# Patient Record
Sex: MALE | Race: WHITE | Employment: OTHER | URBAN - METROPOLITAN AREA
[De-identification: names, ages, dates, MRNs, and addresses within clinical notes are randomized per-mention and may not be internally consistent; named-entity substitution may affect disease eponyms.]

---

## 2017-05-19 PROBLEM — G20.C PARKINSONISM: Status: ACTIVE | Noted: 2017-05-19

## 2017-05-19 PROBLEM — Z96.659 S/P KNEE REPLACEMENT: Status: ACTIVE | Noted: 2017-05-19

## 2017-05-19 PROBLEM — G20 PARKINSONISM (HCC): Status: ACTIVE | Noted: 2017-05-19

## 2020-08-07 ENCOUNTER — OFFICE VISIT (OUTPATIENT)
Dept: ORTHOPEDIC SURGERY | Age: 70
End: 2020-08-07
Payer: COMMERCIAL

## 2020-08-07 VITALS — HEIGHT: 69 IN | WEIGHT: 135 LBS | TEMPERATURE: 98 F | BODY MASS INDEX: 19.99 KG/M2

## 2020-08-07 PROCEDURE — 99203 OFFICE O/P NEW LOW 30 MIN: CPT | Performed by: ORTHOPAEDIC SURGERY

## 2020-08-07 NOTE — PROGRESS NOTES
Michael Reed is a 81 y.o. male, who presents   Chief Complaint   Patient presents with    Knee Pain     Left Knee, x 6 weeks, states of instability, no previous knee surgery       HPI[de-identified] Left knee pain is been present for quite some time. Is worsening. Patient would like something done. He is more medial than lateral pain. There is significant deformity. He uses a cane and he switches from one hand to the other depending on his needs at the time. He also has Parkinson's disease which is been treated aggressively including medications and also a deep brain stimulator. He is working with doctors in [facility] [place] clinic are trying to decrease his medications and adjust the stimulator. They are apparently aware of his knee problems and that he may have to have something done. Allergies; medications; past medical, surgical, family, and social history; and problem list have been reviewed today and updated as indicated in this encounter - see below following Ortho specifics. Musculoskeletal: The patient walks with a cane. He has a slow steady gait. He has varus malalignment of the left knee. His parkinsonian tremor is quite variable from practically no tremor to rather violent tremors of the limbs. He attributes part of this to stressful environment and unfamiliar surroundings such as a doctor's office. Left hip range of motion is very good with no pain. Left knee range of motion is about 5 degrees to more than 110 degrees of flexion. There is medial tenderness palpation the left knee. There is significant medial gapping related to chondral wear rather than collateral instability. Collaterals and cruciates are grossly stable. Patellofemoral alignment is good. His calf is supple with no tenderness. There is minimal effusion. There is no increase in local temperature or redness.     Radiologic Studies: Imaging of the left knee and 2 views shows medial bone-on-bone contact in the left knee with hypertrophic marginal changes and varus malalignment. There are significant the lesser changes in the lateral and patellofemoral compartments. ASSESSMENT:  Dafne Meza was seen today for knee pain. Diagnoses and all orders for this visit:    Primary osteoarthritis of left knee     Treatment alternatives were reviewed including medical and physical therapies, injections, and surgical options, expected risks benefits and likely outcome of each were discussed in detail, questions asked and answered and understood. We discussed the symptoms as well as physical findings and potential solutions. Definitive treatment would be knee replacement. He indicates that he would like to proceed with that and that it should cause no problems with his Parkinson's disease. He will contact his doctors in [facility] [employer] though for any recommendations for medication adjustments or stimulator adjustment which was turned off before the right knee replacement. PLAN: Total left knee replacement arthroplasty. The surgery, risk, prognosis, limitations and rehab issues were discussed in detail with apparent good understanding. The patient was counseled at length about the risks of alf Covid-19 during their perioperative period and any recovery window from their procedure. The patient was made aware that alf Covid-19  may worsen their prognosis for recovering from their procedure  and lend to a higher morbidity and/or mortality risk. All material risks, benefits, and reasonable alternatives including postponing the procedure were discussed. The patient does wish to proceed with the procedure at this time.               Patient Active Problem List   Diagnosis    S/P knee replacement    Parkinsonism [facility])       Past Medical History:   Diagnosis Date    Parkinson's disease ([facility] [address]        Past Surgical History:   Procedure Laterality Date    DEEP BRAIN STIMULATOR PLACEMENT      L chest    FRACTURE SURGERY      nose    JOINT REPLACEMENT Right 05/16/2017    knee    KNEE ARTHROSCOPY Right     TONSILLECTOMY         Current Outpatient Medications   Medication Sig Dispense Refill    carbidopa-levodopa (SINEMET)  MG per tablet Take 2 tablets by mouth 4 times daily      carbidopa-levodopa (SINEMET)  MG per tablet TAKE 2 TABLETS BY MOUTH AT 6 00 A. M. THEN TAKE 1 1 2 TABLETS AT 11 00 A. M. 3 00 P. M. AND 8 00 P. Rafita Cassidy sertraline (ZOLOFT) 50 MG tablet TAKE 1 2 TABLET BY MOUTH ONCE DAILY FOR 7 DAYS THEN INCREASE TO 1 TABLET DAILY THEREAFTER      aspirin 325 MG EC tablet Take 1 tablet by mouth 2 times daily 30 tablet 0    rOPINIRole (REQUIP) 3 MG tablet Take 3 mg by mouth 3 times daily      carbidopa (LODOSYN) 25 MG TABS tablet Take 1 tablet by mouth 3 times daily      loratadine (CLARITIN) 10 MG tablet Take 10 mg by mouth daily      ibuprofen (ADVIL;MOTRIN) 600 MG tablet Take 600 mg by mouth every 6 hours as needed for Pain       No current facility-administered medications for this visit.         Allergies   Allergen Reactions    Seasonal        Social History     Socioeconomic History    Marital status:      Spouse name: None    Number of children: None    Years of education: None    Highest education level: None   Occupational History    None   Social Needs    Financial resource strain: None    Food insecurity     Worry: None     Inability: None    Transportation needs     Medical: None     Non-medical: None   Tobacco Use    Smoking status: Never Smoker   Substance and Sexual Activity    Alcohol use: No    Drug use: No    Sexual activity: None   Lifestyle    Physical activity     Days per week: None     Minutes per session: None    Stress: None   Relationships    Social connections     Talks on phone: None     Gets together: None     Attends Zoroastrian service: None     Active member of club or organization: None     Attends meetings of clubs or organizations: None     Relationship status: None  Intimate partner violence     Fear of current or ex partner: None     Emotionally abused: None     Physically abused: None     Forced sexual activity: None   Other Topics Concern    None   Social History Narrative    None       History reviewed. No pertinent family history. Review of Systems:   As follows except as previously noted in HPI:  Constitutional: Negative for chills, diaphoresis,  fever   Respiratory: Negative for cough, shortness of breath and wheezing. Cardiovascular: Negative for chest pain and palpitations. Neurological: Negative for dizziness, syncope,   GI / : abdominal pain or cramping  Musculoskeletal: see HPI       Objective:   Physical Exam   Constitutional: Oriented to person, place, and time. and appears well-developed and well-nourished. :   Head: Normocephalic and atraumatic. Neck: Neck supple. Eyes: EOM are normal.   Pulmonary/Chest: Effort normal.  No respiratory distress, no wheezes. Neurological: Alert and oriented to person  Skin: Skin is warm and dry. Joni Pinto, DO    8/7/20  11:26 AM    All reasonable efforts have been made to minimize the risk of errors that may occur in the use of voice recognition and other electronic means of charting.

## 2020-08-26 RX ORDER — SODIUM CHLORIDE 0.9 % (FLUSH) 0.9 %
10 SYRINGE (ML) INJECTION PRN
Status: CANCELLED | OUTPATIENT
Start: 2020-08-26

## 2020-08-26 RX ORDER — SODIUM CHLORIDE 0.9 % (FLUSH) 0.9 %
10 SYRINGE (ML) INJECTION EVERY 12 HOURS SCHEDULED
Status: CANCELLED | OUTPATIENT
Start: 2020-08-26

## 2020-08-26 RX ORDER — SODIUM CHLORIDE, SODIUM LACTATE, POTASSIUM CHLORIDE, CALCIUM CHLORIDE 600; 310; 30; 20 MG/100ML; MG/100ML; MG/100ML; MG/100ML
INJECTION, SOLUTION INTRAVENOUS CONTINUOUS
Status: CANCELLED | OUTPATIENT
Start: 2020-08-26

## 2020-08-27 ENCOUNTER — HOSPITAL ENCOUNTER (OUTPATIENT)
Age: 70
Discharge: HOME | End: 2020-08-29
Payer: COMMERCIAL

## 2020-08-27 PROCEDURE — U0003 INFECTIOUS AGENT DETECTION BY NUCLEIC ACID (DNA OR RNA); SEVERE ACUTE RESPIRATORY SYNDROME CORONAVIRUS 2 (SARS-COV-2) (CORONAVIRUS DISEASE [COVID-19]), AMPLIFIED PROBE TECHNIQUE, MAKING USE OF HIGH THROUGHPUT TECHNOLOGIES AS DESCRIBED BY CMS-2020-01-R: HCPCS

## 2020-08-29 LAB
SARS-COV-2: NOT DETECTED
SOURCE: NORMAL

## 2020-08-31 ENCOUNTER — HOSPITAL ENCOUNTER (OUTPATIENT)
Dept: PREADMISSION TESTING | Age: 70
Discharge: HOME | End: 2020-08-31
Payer: COMMERCIAL

## 2020-08-31 ENCOUNTER — ANESTHESIA EVENT (OUTPATIENT)
Dept: OPERATING ROOM | Age: 70
End: 2020-08-31
Payer: COMMERCIAL

## 2020-08-31 VITALS
HEIGHT: 69 IN | SYSTOLIC BLOOD PRESSURE: 170 MMHG | BODY MASS INDEX: 20.82 KG/M2 | DIASTOLIC BLOOD PRESSURE: 85 MMHG | TEMPERATURE: 97.1 F | OXYGEN SATURATION: 99 % | RESPIRATION RATE: 20 BRPM | HEART RATE: 74 BPM | WEIGHT: 140.56 LBS

## 2020-08-31 LAB
ABO/RH: NORMAL
ANION GAP SERPL CALCULATED.3IONS-SCNC: 11 MMOL/L (ref 7–16)
ANTIBODY SCREEN: NORMAL
APTT: 35.4 SEC (ref 24.5–35.1)
BUN BLDV-MCNC: 14 MG/DL (ref 8–23)
CALCIUM SERPL-MCNC: 8.9 MG/DL (ref 8.6–10.2)
CHLORIDE BLD-SCNC: 103 MMOL/L (ref 98–107)
CO2: 27 MMOL/L (ref 22–29)
CREAT SERPL-MCNC: 0.6 MG/DL (ref 0.7–1.2)
GFR AFRICAN AMERICAN: >60
GFR NON-AFRICAN AMERICAN: >60 ML/MIN/1.73
GLUCOSE BLD-MCNC: 95 MG/DL (ref 74–99)
HCT VFR BLD CALC: 41.6 % (ref 37–54)
HEMOGLOBIN: 13.8 G/DL (ref 12.5–16.5)
INR BLD: 1.1
MCH RBC QN AUTO: 29.7 PG (ref 26–35)
MCHC RBC AUTO-ENTMCNC: 33.2 % (ref 32–34.5)
MCV RBC AUTO: 89.7 FL (ref 80–99.9)
PDW BLD-RTO: 12.1 FL (ref 11.5–15)
PLATELET # BLD: 236 E9/L (ref 130–450)
PMV BLD AUTO: 10 FL (ref 7–12)
POTASSIUM REFLEX MAGNESIUM: 4.4 MMOL/L (ref 3.5–5)
PROTHROMBIN TIME: 12.2 SEC (ref 9.3–12.4)
RBC # BLD: 4.64 E12/L (ref 3.8–5.8)
SODIUM BLD-SCNC: 141 MMOL/L (ref 132–146)
WBC # BLD: 4.4 E9/L (ref 4.5–11.5)

## 2020-08-31 PROCEDURE — 87081 CULTURE SCREEN ONLY: CPT

## 2020-08-31 PROCEDURE — 36415 COLL VENOUS BLD VENIPUNCTURE: CPT

## 2020-08-31 PROCEDURE — 85730 THROMBOPLASTIN TIME PARTIAL: CPT

## 2020-08-31 PROCEDURE — 86850 RBC ANTIBODY SCREEN: CPT

## 2020-08-31 PROCEDURE — 85027 COMPLETE CBC AUTOMATED: CPT

## 2020-08-31 PROCEDURE — 86900 BLOOD TYPING SEROLOGIC ABO: CPT

## 2020-08-31 PROCEDURE — 86901 BLOOD TYPING SEROLOGIC RH(D): CPT

## 2020-08-31 PROCEDURE — 80048 BASIC METABOLIC PNL TOTAL CA: CPT

## 2020-08-31 PROCEDURE — 93005 ELECTROCARDIOGRAM TRACING: CPT | Performed by: ANESTHESIOLOGY

## 2020-08-31 PROCEDURE — 85610 PROTHROMBIN TIME: CPT

## 2020-08-31 RX ORDER — FENTANYL CITRATE 50 UG/ML
100 INJECTION, SOLUTION INTRAMUSCULAR; INTRAVENOUS ONCE
Status: DISCONTINUED | OUTPATIENT
Start: 2020-09-01 | End: 2020-09-01 | Stop reason: HOSPADM

## 2020-08-31 RX ORDER — ROPIVACAINE HYDROCHLORIDE 5 MG/ML
30 INJECTION, SOLUTION EPIDURAL; INFILTRATION; PERINEURAL ONCE
Status: CANCELLED | OUTPATIENT
Start: 2020-08-31 | End: 2020-08-31

## 2020-08-31 RX ORDER — MIDAZOLAM HYDROCHLORIDE 1 MG/ML
1 INJECTION INTRAMUSCULAR; INTRAVENOUS EVERY 5 MIN PRN
Status: CANCELLED | OUTPATIENT
Start: 2020-08-31

## 2020-08-31 ASSESSMENT — PAIN DESCRIPTION - ORIENTATION: ORIENTATION: LEFT

## 2020-08-31 ASSESSMENT — PAIN DESCRIPTION - PROGRESSION: CLINICAL_PROGRESSION: GRADUALLY WORSENING

## 2020-08-31 ASSESSMENT — PAIN DESCRIPTION - PAIN TYPE: TYPE: CHRONIC PAIN

## 2020-08-31 ASSESSMENT — PAIN SCALES - GENERAL: PAINLEVEL_OUTOF10: 10

## 2020-08-31 ASSESSMENT — PAIN DESCRIPTION - LOCATION: LOCATION: KNEE

## 2020-08-31 ASSESSMENT — PAIN DESCRIPTION - ONSET: ONSET: ON-GOING

## 2020-08-31 ASSESSMENT — PAIN DESCRIPTION - DESCRIPTORS: DESCRIPTORS: ACHING

## 2020-08-31 NOTE — ANESTHESIA PRE PROCEDURE
Department of Anesthesiology  Preprocedure Note       Name:  Julio Munoz   Age:  81 y.o.  :  1950                                          MRN:  [id number]         Date:  2020      Surgeon: Rachna Klein):  PAULETTE Guerrero DO    Procedure: Procedure(s):  TOTAL LEFT KNEE REPLACEMENT/ ARTHROPLASTY (JOLENE)    Medications prior to admission:   Prior to Admission medications    Medication Sig Start Date End Date Taking? Authorizing Provider   carbidopa-levodopa (SINEMET)  MG per tablet Take 2 tablets by mouth 4 times daily 20  Yes Historical Provider, MD   sertraline (ZOLOFT) 50 MG tablet TAKE 1 2 TABLET BY MOUTH ONCE DAILY FOR 7 DAYS THEN INCREASE TO 1 TABLET DAILY THEREAFTER 20  Yes Historical Provider, MD   loratadine (CLARITIN) 10 MG tablet Take 10 mg by mouth daily   Yes Historical Provider, MD   ibuprofen (ADVIL;MOTRIN) 600 MG tablet Take 600 mg by mouth every 6 hours as needed for Pain   Yes Historical Provider, MD       Current medications:    Current Outpatient Medications   Medication Sig Dispense Refill    carbidopa-levodopa (SINEMET)  MG per tablet Take 2 tablets by mouth 4 times daily      sertraline (ZOLOFT) 50 MG tablet TAKE 1 2 TABLET BY MOUTH ONCE DAILY FOR 7 DAYS THEN INCREASE TO 1 TABLET DAILY THEREAFTER      loratadine (CLARITIN) 10 MG tablet Take 10 mg by mouth daily      ibuprofen (ADVIL;MOTRIN) 600 MG tablet Take 600 mg by mouth every 6 hours as needed for Pain       No current facility-administered medications for this encounter. Allergies:     Allergies   Allergen Reactions    Seasonal        Problem List:    Patient Active Problem List   Diagnosis Code    S/P knee replacement Z96.659    Parkinsonism ([facility] [address] G20       Past Medical History:        Diagnosis Date    Parkinson's disease ([facility] [address]        Past Surgical History:        Procedure Laterality Date    DEEP BRAIN STIMULATOR PLACEMENT      L chest    FRACTURE SURGERY      nose    JOINT Type & Screen (If Applicable):  No results found for: LABABO, LABRH    Drug/Infectious Status (If Applicable):  No results found for: HIV, HEPCAB    COVID-19 Screening (If Applicable):   Lab Results   Component Value Date    COVID19 Not Detected 08/27/2020         Anesthesia Evaluation  Patient summary reviewed and Nursing notes reviewed  Airway: Mallampati: II  TM distance: >3 FB   Neck ROM: full  Mouth opening: > = 3 FB Dental: normal exam   (+) upper dentures      Pulmonary:Negative Pulmonary ROS breath sounds clear to auscultation                             Cardiovascular:Negative CV ROS            Rhythm: regular  Rate: normal                    Neuro/Psych:   (+) neuromuscular disease: Parkinson's disease, depression/anxiety             GI/Hepatic/Renal: Neg GI/Hepatic/Renal ROS            Endo/Other:    (+) : arthritis: OA., .                 Abdominal:           Vascular:                                      Anesthesia Plan      spinal     ASA 3     (Pt accepting of spinal and ACB with sedation. Has a brain stimulator which will need to be turned off, pt may have more confusion and nausea post op has a letter from the [facility] [facility] [place] [facility] from his neurologist. Will need bipolar cautery. Had previous RTKR and did well with the stimulator off.)  Induction: intravenous. MIPS: Postoperative opioids intended. Anesthetic plan and risks discussed with patient. Plan discussed with CRNA and attending.                 Keyona Alvarez MD   8/31/2020

## 2020-08-31 NOTE — PROGRESS NOTES
Call to Sav in Dr. Teena Crowley office re: Dr. Ben Kam will see pt day of surgery for clearance. This information reviewed with Dr. Ketan Romo also.
Called Sav at Dr. Nelida Busby office with concern on turning off DBS for surgery. Sav sent us an 800 number to [employer] to speak to someone about this. Attempted to speak with someone at [employer] to no avail; on hold for too long. Asked the pt. And his wife if they wanted to stay and wait on hold with [employer] or leave and call them themselves today for instruction with a patient . Pt. Went home and 800 number given to wife to call [employer] for instruction.
Case reviewed with Dr. Facundo Perales- neurology note read to him, Dr. Facundo Perales would like medical clearance for pt. I spoke with Sav at Dr. Darryle Butte office to let her know pt did not show up for his PAT appointment, that he needs medical clearance, that I am unable to reach pt or his wife.
or nail polish on your fingers or toes. 11. DO NOT wear any jewelry or piercings on day of surgery. All body piercing jewelry must be removed. 12. Shower the night before surgery with _x__Antibacterial soap /STANLEY WIPES____x____    13. TOTAL JOINT REPLACEMENT/HYSTERECTOMY PATIENTS ONLY---Remember to bring Blood Bank bracelet to the hospital on the day of surgery. 14. If you have a Living Will and Durable Power of  for Healthcare, please bring in a copy. 15. If appropriate bring crutches, inspirex, WALKER, CANE etc... 12. Notify your Surgeon if you develop any illness between now and surgery time, cough, cold, fever, sore throat, nausea, vomiting, etc.  Please notify your surgeon if you experience dizziness, shortness of breath or blurred vision between now & the time of your surgery. 17. If you have _x__dentures, they will be removed before going to the OR; we will provide you a container. If you wear ___contact lenses or ___glasses, they will be removed; please bring a case for them. 18. To provide excellent care visitors will be limited to 1 in the room at any given time. 19. Please bring picture ID and insurance card. 20. Sleep apnea patients need to bring CPAP AND SETTINGS to hospital on day of surgery. 21. During flu season no children under the age of 15 are permitted in the hospital for the safety of all patients. 22. Other                  Please call AMBULATORY CARE if you have any further questions.    [address]     [address]

## 2020-08-31 NOTE — CARE COORDINATION
SS NOTE: HUSSEIN met with pt and his wife Yann Rapp in [place] today. Pt is slated for KA on 09/01. Pt resides with Yann Rapp in a home with 1 step entry no railing. Pt's wife works outside of the home however is on a vacation this week and will be home. Pt was I pta with adls iadls and driving. Pt has DME at home; standard walker, extended tub bench and a straight cane. Pt relates that he had a RTKA in 2017. He went home with [facility] [address] at that time but cannot recall the name of the [facility] [place] [address] provider. Pt agrees to use [facility] and will need orders to complete a referral at [facility]- SW advised [facility] of the referral and they will follow pt upon admission. Pt has no hx of SNF /Rehab. Pt has no PCP and he uses [address] SW will follow pt once admitted for final [facility] planning for home. Laura Bell. 8/31/2020.12:51 PM.

## 2020-09-01 ENCOUNTER — ANESTHESIA (OUTPATIENT)
Dept: OPERATING ROOM | Age: 70
End: 2020-09-01
Payer: COMMERCIAL

## 2020-09-01 ENCOUNTER — APPOINTMENT (OUTPATIENT)
Dept: GENERAL RADIOLOGY | Age: 70
End: 2020-09-01
Attending: ORTHOPAEDIC SURGERY
Payer: COMMERCIAL

## 2020-09-01 ENCOUNTER — HOSPITAL ENCOUNTER (OUTPATIENT)
Age: 70
Setting detail: OBSERVATION
Discharge: HOME | End: 2020-09-03
Attending: ORTHOPAEDIC SURGERY | Admitting: ORTHOPAEDIC SURGERY
Payer: COMMERCIAL

## 2020-09-01 VITALS
SYSTOLIC BLOOD PRESSURE: 104 MMHG | OXYGEN SATURATION: 98 % | RESPIRATION RATE: 15 BRPM | DIASTOLIC BLOOD PRESSURE: 66 MMHG

## 2020-09-01 PROBLEM — M17.12 ARTHRITIS OF LEFT KNEE: Status: ACTIVE | Noted: 2020-09-01

## 2020-09-01 LAB — MRSA CULTURE ONLY: NORMAL

## 2020-09-01 PROCEDURE — C1713 ANCHOR/SCREW BN/BN,TIS/BN: HCPCS | Performed by: ORTHOPAEDIC SURGERY

## 2020-09-01 PROCEDURE — 88305 TISSUE EXAM BY PATHOLOGIST: CPT

## 2020-09-01 PROCEDURE — 2580000003 HC RX 258: Performed by: ORTHOPAEDIC SURGERY

## 2020-09-01 PROCEDURE — 6360000002 HC RX W HCPCS: Performed by: ORTHOPAEDIC SURGERY

## 2020-09-01 PROCEDURE — 88311 DECALCIFY TISSUE: CPT

## 2020-09-01 PROCEDURE — 2500000003 HC RX 250 WO HCPCS: Performed by: NURSE ANESTHETIST, CERTIFIED REGISTERED

## 2020-09-01 PROCEDURE — 6370000000 HC RX 637 (ALT 250 FOR IP): Performed by: ORTHOPAEDIC SURGERY

## 2020-09-01 PROCEDURE — 6370000000 HC RX 637 (ALT 250 FOR IP): Performed by: INTERNAL MEDICINE

## 2020-09-01 PROCEDURE — C1776 JOINT DEVICE (IMPLANTABLE): HCPCS | Performed by: ORTHOPAEDIC SURGERY

## 2020-09-01 PROCEDURE — 6360000002 HC RX W HCPCS: Performed by: NURSE ANESTHETIST, CERTIFIED REGISTERED

## 2020-09-01 PROCEDURE — 3700000001 HC ADD 15 MINUTES (ANESTHESIA): Performed by: ORTHOPAEDIC SURGERY

## 2020-09-01 PROCEDURE — 7100000000 HC PACU RECOVERY - FIRST 15 MIN: Performed by: ORTHOPAEDIC SURGERY

## 2020-09-01 PROCEDURE — 7100000001 HC PACU RECOVERY - ADDTL 15 MIN: Performed by: ORTHOPAEDIC SURGERY

## 2020-09-01 PROCEDURE — 6360000002 HC RX W HCPCS

## 2020-09-01 PROCEDURE — G0378 HOSPITAL OBSERVATION PER HR: HCPCS

## 2020-09-01 PROCEDURE — 97165 OT EVAL LOW COMPLEX 30 MIN: CPT

## 2020-09-01 PROCEDURE — 97110 THERAPEUTIC EXERCISES: CPT | Performed by: PHYSICAL THERAPIST

## 2020-09-01 PROCEDURE — 2500000003 HC RX 250 WO HCPCS: Performed by: ORTHOPAEDIC SURGERY

## 2020-09-01 PROCEDURE — 3600000005 HC SURGERY LEVEL 5 BASE: Performed by: ORTHOPAEDIC SURGERY

## 2020-09-01 PROCEDURE — 76942 ECHO GUIDE FOR BIOPSY: CPT | Performed by: ANESTHESIOLOGY

## 2020-09-01 PROCEDURE — 3600000015 HC SURGERY LEVEL 5 ADDTL 15MIN: Performed by: ORTHOPAEDIC SURGERY

## 2020-09-01 PROCEDURE — 97535 SELF CARE MNGMENT TRAINING: CPT

## 2020-09-01 PROCEDURE — 3700000000 HC ANESTHESIA ATTENDED CARE: Performed by: ORTHOPAEDIC SURGERY

## 2020-09-01 PROCEDURE — 2709999900 HC NON-CHARGEABLE SUPPLY: Performed by: ORTHOPAEDIC SURGERY

## 2020-09-01 PROCEDURE — 73560 X-RAY EXAM OF KNEE 1 OR 2: CPT

## 2020-09-01 PROCEDURE — 97161 PT EVAL LOW COMPLEX 20 MIN: CPT | Performed by: PHYSICAL THERAPIST

## 2020-09-01 PROCEDURE — 27447 TOTAL KNEE ARTHROPLASTY: CPT | Performed by: ORTHOPAEDIC SURGERY

## 2020-09-01 PROCEDURE — 2580000003 HC RX 258: Performed by: ANESTHESIOLOGY

## 2020-09-01 PROCEDURE — 2580000003 HC RX 258: Performed by: NURSE ANESTHETIST, CERTIFIED REGISTERED

## 2020-09-01 DEVICE — COMPONENT PAT DIA27MM THK8MM KNEE X3 SYMMETRIC TRIATHLON: Type: IMPLANTABLE DEVICE | Site: KNEE | Status: FUNCTIONAL

## 2020-09-01 DEVICE — COMPONENT FEM SZ 5 L KNEE POST STBL CEM TRIATHLON: Type: IMPLANTABLE DEVICE | Site: KNEE | Status: FUNCTIONAL

## 2020-09-01 DEVICE — COMPONENT PART KNEE CAPPED K1 STRYKER: Type: IMPLANTABLE DEVICE | Site: KNEE | Status: FUNCTIONAL

## 2020-09-01 DEVICE — BASEPLATE TIB SZ 5 KNEE TRITANIUM CEM PRI LO PROF TRIATHLON: Type: IMPLANTABLE DEVICE | Site: KNEE | Status: FUNCTIONAL

## 2020-09-01 DEVICE — CEMENT BNE 40 GM RADIOPAQUE BA SIMPLEX P: Type: IMPLANTABLE DEVICE | Site: KNEE | Status: FUNCTIONAL

## 2020-09-01 DEVICE — INSERT TIB PS 5 10 MM ARTC KNEE BEAR TECHNOLOGY X3 TRIATHLON: Type: IMPLANTABLE DEVICE | Site: KNEE | Status: FUNCTIONAL

## 2020-09-01 RX ORDER — MORPHINE SULFATE 2 MG/ML
2 INJECTION, SOLUTION INTRAMUSCULAR; INTRAVENOUS EVERY 4 HOURS PRN
Status: DISCONTINUED | OUTPATIENT
Start: 2020-09-01 | End: 2020-09-03 | Stop reason: HOSPADM

## 2020-09-01 RX ORDER — SENNA AND DOCUSATE SODIUM 50; 8.6 MG/1; MG/1
1 TABLET, FILM COATED ORAL 2 TIMES DAILY
Status: DISCONTINUED | OUTPATIENT
Start: 2020-09-01 | End: 2020-09-03 | Stop reason: HOSPADM

## 2020-09-01 RX ORDER — GLYCOPYRROLATE 1 MG/5 ML
SYRINGE (ML) INTRAVENOUS PRN
Status: DISCONTINUED | OUTPATIENT
Start: 2020-09-01 | End: 2020-09-01 | Stop reason: SDUPTHER

## 2020-09-01 RX ORDER — SODIUM CHLORIDE 0.9 % (FLUSH) 0.9 %
10 SYRINGE (ML) INJECTION EVERY 12 HOURS SCHEDULED
Status: DISCONTINUED | OUTPATIENT
Start: 2020-09-01 | End: 2020-09-03 | Stop reason: HOSPADM

## 2020-09-01 RX ORDER — MORPHINE SULFATE 4 MG/ML
4 INJECTION, SOLUTION INTRAMUSCULAR; INTRAVENOUS
Status: DISCONTINUED | OUTPATIENT
Start: 2020-09-01 | End: 2020-09-01 | Stop reason: DRUGHIGH

## 2020-09-01 RX ORDER — SODIUM CHLORIDE 0.9 % (FLUSH) 0.9 %
10 SYRINGE (ML) INJECTION PRN
Status: DISCONTINUED | OUTPATIENT
Start: 2020-09-01 | End: 2020-09-01 | Stop reason: HOSPADM

## 2020-09-01 RX ORDER — MORPHINE SULFATE 2 MG/ML
2 INJECTION, SOLUTION INTRAMUSCULAR; INTRAVENOUS
Status: DISCONTINUED | OUTPATIENT
Start: 2020-09-01 | End: 2020-09-01 | Stop reason: DRUGHIGH

## 2020-09-01 RX ORDER — MIDAZOLAM HYDROCHLORIDE 1 MG/ML
INJECTION INTRAMUSCULAR; INTRAVENOUS
Status: COMPLETED
Start: 2020-09-01 | End: 2020-09-01

## 2020-09-01 RX ORDER — ROPIVACAINE HYDROCHLORIDE 5 MG/ML
30 INJECTION, SOLUTION EPIDURAL; INFILTRATION; PERINEURAL ONCE
Status: COMPLETED | OUTPATIENT
Start: 2020-09-01 | End: 2020-09-01

## 2020-09-01 RX ORDER — SODIUM CHLORIDE 0.9 % (FLUSH) 0.9 %
10 SYRINGE (ML) INJECTION EVERY 12 HOURS SCHEDULED
Status: DISCONTINUED | OUTPATIENT
Start: 2020-09-01 | End: 2020-09-01 | Stop reason: HOSPADM

## 2020-09-01 RX ORDER — PROPOFOL 10 MG/ML
INJECTION, EMULSION INTRAVENOUS CONTINUOUS PRN
Status: DISCONTINUED | OUTPATIENT
Start: 2020-09-01 | End: 2020-09-01 | Stop reason: SDUPTHER

## 2020-09-01 RX ORDER — MORPHINE SULFATE 4 MG/ML
3 INJECTION, SOLUTION INTRAMUSCULAR; INTRAVENOUS EVERY 4 HOURS PRN
Status: DISCONTINUED | OUTPATIENT
Start: 2020-09-01 | End: 2020-09-03 | Stop reason: HOSPADM

## 2020-09-01 RX ORDER — SODIUM CHLORIDE, SODIUM LACTATE, POTASSIUM CHLORIDE, CALCIUM CHLORIDE 600; 310; 30; 20 MG/100ML; MG/100ML; MG/100ML; MG/100ML
INJECTION, SOLUTION INTRAVENOUS CONTINUOUS PRN
Status: DISCONTINUED | OUTPATIENT
Start: 2020-09-01 | End: 2020-09-01 | Stop reason: SDUPTHER

## 2020-09-01 RX ORDER — CELECOXIB 100 MG/1
100 CAPSULE ORAL ONCE
Status: COMPLETED | OUTPATIENT
Start: 2020-09-01 | End: 2020-09-01

## 2020-09-01 RX ORDER — OXYCODONE HYDROCHLORIDE 5 MG/1
10 TABLET ORAL EVERY 4 HOURS PRN
Status: DISCONTINUED | OUTPATIENT
Start: 2020-09-01 | End: 2020-09-03 | Stop reason: HOSPADM

## 2020-09-01 RX ORDER — ONDANSETRON 2 MG/ML
4 INJECTION INTRAMUSCULAR; INTRAVENOUS
Status: DISCONTINUED | OUTPATIENT
Start: 2020-09-01 | End: 2020-09-01 | Stop reason: HOSPADM

## 2020-09-01 RX ORDER — CETIRIZINE HYDROCHLORIDE 10 MG/1
10 TABLET ORAL DAILY
Status: DISCONTINUED | OUTPATIENT
Start: 2020-09-01 | End: 2020-09-03 | Stop reason: HOSPADM

## 2020-09-01 RX ORDER — ACETAMINOPHEN 500 MG
1000 TABLET ORAL ONCE
Status: COMPLETED | OUTPATIENT
Start: 2020-09-01 | End: 2020-09-01

## 2020-09-01 RX ORDER — OXYCODONE HYDROCHLORIDE 5 MG/1
5 TABLET ORAL EVERY 4 HOURS PRN
Status: DISCONTINUED | OUTPATIENT
Start: 2020-09-01 | End: 2020-09-03 | Stop reason: HOSPADM

## 2020-09-01 RX ORDER — LABETALOL HYDROCHLORIDE 5 MG/ML
5 INJECTION, SOLUTION INTRAVENOUS EVERY 10 MIN PRN
Status: DISCONTINUED | OUTPATIENT
Start: 2020-09-01 | End: 2020-09-01 | Stop reason: HOSPADM

## 2020-09-01 RX ORDER — DEXTROSE AND SODIUM CHLORIDE 5; .45 G/100ML; G/100ML
INJECTION, SOLUTION INTRAVENOUS CONTINUOUS
Status: DISCONTINUED | OUTPATIENT
Start: 2020-09-01 | End: 2020-09-02

## 2020-09-01 RX ORDER — ACETAMINOPHEN 325 MG/1
650 TABLET ORAL EVERY 6 HOURS
Status: DISCONTINUED | OUTPATIENT
Start: 2020-09-01 | End: 2020-09-03 | Stop reason: HOSPADM

## 2020-09-01 RX ORDER — FENTANYL CITRATE 50 UG/ML
50 INJECTION, SOLUTION INTRAMUSCULAR; INTRAVENOUS EVERY 5 MIN PRN
Status: DISCONTINUED | OUTPATIENT
Start: 2020-09-01 | End: 2020-09-01

## 2020-09-01 RX ORDER — SODIUM CHLORIDE, SODIUM LACTATE, POTASSIUM CHLORIDE, CALCIUM CHLORIDE 600; 310; 30; 20 MG/100ML; MG/100ML; MG/100ML; MG/100ML
INJECTION, SOLUTION INTRAVENOUS CONTINUOUS
Status: DISCONTINUED | OUTPATIENT
Start: 2020-09-01 | End: 2020-09-01

## 2020-09-01 RX ORDER — HYDROCODONE BITARTRATE AND ACETAMINOPHEN 5; 325 MG/1; MG/1
1 TABLET ORAL EVERY 6 HOURS PRN
Qty: 28 TABLET | Refills: 0 | Status: SHIPPED | OUTPATIENT
Start: 2020-09-01 | End: 2020-09-08

## 2020-09-01 RX ORDER — BUPIVACAINE HYDROCHLORIDE 2.5 MG/ML
INJECTION, SOLUTION EPIDURAL; INFILTRATION; INTRACAUDAL PRN
Status: DISCONTINUED | OUTPATIENT
Start: 2020-09-01 | End: 2020-09-01 | Stop reason: ALTCHOICE

## 2020-09-01 RX ORDER — GABAPENTIN 300 MG/1
300 CAPSULE ORAL ONCE
Status: COMPLETED | OUTPATIENT
Start: 2020-09-01 | End: 2020-09-01

## 2020-09-01 RX ORDER — ROPIVACAINE HYDROCHLORIDE 5 MG/ML
INJECTION, SOLUTION EPIDURAL; INFILTRATION; PERINEURAL
Status: COMPLETED
Start: 2020-09-01 | End: 2020-09-01

## 2020-09-01 RX ORDER — FENTANYL CITRATE 50 UG/ML
INJECTION, SOLUTION INTRAMUSCULAR; INTRAVENOUS
Status: COMPLETED
Start: 2020-09-01 | End: 2020-09-01

## 2020-09-01 RX ORDER — MEPERIDINE HYDROCHLORIDE 25 MG/ML
12.5 INJECTION INTRAMUSCULAR; INTRAVENOUS; SUBCUTANEOUS EVERY 5 MIN PRN
Status: DISCONTINUED | OUTPATIENT
Start: 2020-09-01 | End: 2020-09-01 | Stop reason: HOSPADM

## 2020-09-01 RX ORDER — HYDRALAZINE HYDROCHLORIDE 20 MG/ML
5 INJECTION INTRAMUSCULAR; INTRAVENOUS EVERY 10 MIN PRN
Status: DISCONTINUED | OUTPATIENT
Start: 2020-09-01 | End: 2020-09-01 | Stop reason: HOSPADM

## 2020-09-01 RX ORDER — PROMETHAZINE HYDROCHLORIDE 25 MG/ML
6.25 INJECTION, SOLUTION INTRAMUSCULAR; INTRAVENOUS
Status: DISCONTINUED | OUTPATIENT
Start: 2020-09-01 | End: 2020-09-01 | Stop reason: HOSPADM

## 2020-09-01 RX ORDER — BUPIVACAINE HYDROCHLORIDE 7.5 MG/ML
INJECTION, SOLUTION INTRASPINAL PRN
Status: DISCONTINUED | OUTPATIENT
Start: 2020-09-01 | End: 2020-09-01 | Stop reason: SDUPTHER

## 2020-09-01 RX ORDER — VANCOMYCIN HYDROCHLORIDE 1 G/20ML
INJECTION, POWDER, LYOPHILIZED, FOR SOLUTION INTRAVENOUS PRN
Status: DISCONTINUED | OUTPATIENT
Start: 2020-09-01 | End: 2020-09-01 | Stop reason: ALTCHOICE

## 2020-09-01 RX ORDER — DEXAMETHASONE SODIUM PHOSPHATE 10 MG/ML
8 INJECTION INTRAMUSCULAR; INTRAVENOUS ONCE
Status: COMPLETED | OUTPATIENT
Start: 2020-09-01 | End: 2020-09-01

## 2020-09-01 RX ORDER — ROPIVACAINE HYDROCHLORIDE 5 MG/ML
INJECTION, SOLUTION EPIDURAL; INFILTRATION; PERINEURAL
Status: COMPLETED | OUTPATIENT
Start: 2020-09-01 | End: 2020-09-01

## 2020-09-01 RX ORDER — FENTANYL CITRATE 50 UG/ML
INJECTION, SOLUTION INTRAMUSCULAR; INTRAVENOUS PRN
Status: DISCONTINUED | OUTPATIENT
Start: 2020-09-01 | End: 2020-09-01 | Stop reason: SDUPTHER

## 2020-09-01 RX ORDER — MIDAZOLAM HYDROCHLORIDE 1 MG/ML
1 INJECTION INTRAMUSCULAR; INTRAVENOUS EVERY 5 MIN PRN
Status: DISCONTINUED | OUTPATIENT
Start: 2020-09-01 | End: 2020-09-01 | Stop reason: HOSPADM

## 2020-09-01 RX ORDER — SODIUM CHLORIDE 0.9 % (FLUSH) 0.9 %
10 SYRINGE (ML) INJECTION PRN
Status: DISCONTINUED | OUTPATIENT
Start: 2020-09-01 | End: 2020-09-03 | Stop reason: HOSPADM

## 2020-09-01 RX ADMIN — OXYCODONE HYDROCHLORIDE 5 MG: 5 TABLET ORAL at 22:33

## 2020-09-01 RX ADMIN — GABAPENTIN 300 MG: 300 CAPSULE ORAL at 08:50

## 2020-09-01 RX ADMIN — FENTANYL CITRATE 100 MCG: 50 INJECTION, SOLUTION INTRAMUSCULAR; INTRAVENOUS at 09:15

## 2020-09-01 RX ADMIN — ROPIVACAINE HYDROCHLORIDE 30 ML: 5 INJECTION, SOLUTION EPIDURAL; INFILTRATION; PERINEURAL at 09:20

## 2020-09-01 RX ADMIN — BUPIVACAINE HYDROCHLORIDE IN DEXTROSE 1.8 ML: 7.5 INJECTION, SOLUTION SUBARACHNOID at 11:31

## 2020-09-01 RX ADMIN — DOCUSATE SODIUM - SENNOSIDES 1 TABLET: 50; 8.6 TABLET, FILM COATED ORAL at 20:52

## 2020-09-01 RX ADMIN — SODIUM CHLORIDE, POTASSIUM CHLORIDE, SODIUM LACTATE AND CALCIUM CHLORIDE: 600; 310; 30; 20 INJECTION, SOLUTION INTRAVENOUS at 13:30

## 2020-09-01 RX ADMIN — Medication 2 G: at 22:26

## 2020-09-01 RX ADMIN — CARBIDOPA AND LEVODOPA 1 TABLET: 25; 100 TABLET ORAL at 21:17

## 2020-09-01 RX ADMIN — ACETAMINOPHEN 650 MG: 325 TABLET, FILM COATED ORAL at 16:03

## 2020-09-01 RX ADMIN — FENTANYL CITRATE 25 MCG: 50 INJECTION, SOLUTION INTRAMUSCULAR; INTRAVENOUS at 12:25

## 2020-09-01 RX ADMIN — CARBIDOPA AND LEVODOPA 2 TABLET: 25; 100 TABLET ORAL at 16:03

## 2020-09-01 RX ADMIN — DEXTROSE AND SODIUM CHLORIDE: 5; 450 INJECTION, SOLUTION INTRAVENOUS at 20:52

## 2020-09-01 RX ADMIN — ROPIVACAINE HYDROCHLORIDE 40 ML: 5 INJECTION, SOLUTION EPIDURAL; INFILTRATION; PERINEURAL at 09:21

## 2020-09-01 RX ADMIN — DEXAMETHASONE SODIUM PHOSPHATE 8 MG: 10 INJECTION INTRAMUSCULAR; INTRAVENOUS at 08:50

## 2020-09-01 RX ADMIN — Medication 2 G: at 11:28

## 2020-09-01 RX ADMIN — SODIUM CHLORIDE, POTASSIUM CHLORIDE, SODIUM LACTATE AND CALCIUM CHLORIDE: 600; 310; 30; 20 INJECTION, SOLUTION INTRAVENOUS at 08:50

## 2020-09-01 RX ADMIN — MIDAZOLAM HYDROCHLORIDE 1 MG: 2 INJECTION, SOLUTION INTRAMUSCULAR; INTRAVENOUS at 09:16

## 2020-09-01 RX ADMIN — Medication 2 G: at 14:15

## 2020-09-01 RX ADMIN — SODIUM CHLORIDE, POTASSIUM CHLORIDE, SODIUM LACTATE AND CALCIUM CHLORIDE: 600; 310; 30; 20 INJECTION, SOLUTION INTRAVENOUS at 11:13

## 2020-09-01 RX ADMIN — FENTANYL CITRATE 25 MCG: 50 INJECTION, SOLUTION INTRAMUSCULAR; INTRAVENOUS at 11:31

## 2020-09-01 RX ADMIN — FENTANYL CITRATE 50 MCG: 50 INJECTION, SOLUTION INTRAMUSCULAR; INTRAVENOUS at 12:51

## 2020-09-01 RX ADMIN — OXYCODONE HYDROCHLORIDE 5 MG: 5 TABLET ORAL at 18:37

## 2020-09-01 RX ADMIN — ACETAMINOPHEN 1000 MG: 500 TABLET, FILM COATED ORAL at 08:51

## 2020-09-01 RX ADMIN — CELECOXIB 100 MG: 100 CAPSULE ORAL at 08:50

## 2020-09-01 RX ADMIN — CARBIDOPA AND LEVODOPA 1 TABLET: 25; 100 TABLET ORAL at 20:45

## 2020-09-01 RX ADMIN — MIDAZOLAM HYDROCHLORIDE 1 MG: 1 INJECTION INTRAMUSCULAR; INTRAVENOUS at 09:16

## 2020-09-01 RX ADMIN — PROPOFOL INJECTABLE EMULSION 50 MCG/KG/MIN: 10 INJECTION, EMULSION INTRAVENOUS at 11:36

## 2020-09-01 RX ADMIN — Medication 0.2 MG: at 12:11

## 2020-09-01 RX ADMIN — SODIUM CHLORIDE, POTASSIUM CHLORIDE, SODIUM LACTATE AND CALCIUM CHLORIDE: 600; 310; 30; 20 INJECTION, SOLUTION INTRAVENOUS at 12:05

## 2020-09-01 RX ADMIN — ACETAMINOPHEN 650 MG: 325 TABLET, FILM COATED ORAL at 20:52

## 2020-09-01 ASSESSMENT — PULMONARY FUNCTION TESTS
PIF_VALUE: 0
PIF_VALUE: 1
PIF_VALUE: 0
PIF_VALUE: 0
PIF_VALUE: 1
PIF_VALUE: 0
PIF_VALUE: 1
PIF_VALUE: 0
PIF_VALUE: 1
PIF_VALUE: 0
PIF_VALUE: 1
PIF_VALUE: 1
PIF_VALUE: 0
PIF_VALUE: 1
PIF_VALUE: 0
PIF_VALUE: 1
PIF_VALUE: 0
PIF_VALUE: 0
PIF_VALUE: 1
PIF_VALUE: 0
PIF_VALUE: 1
PIF_VALUE: 0
PIF_VALUE: 0
PIF_VALUE: 1
PIF_VALUE: 1
PIF_VALUE: 0
PIF_VALUE: 1
PIF_VALUE: 0
PIF_VALUE: 1
PIF_VALUE: 1
PIF_VALUE: 0
PIF_VALUE: 0
PIF_VALUE: 1
PIF_VALUE: 1
PIF_VALUE: 0
PIF_VALUE: 1
PIF_VALUE: 0
PIF_VALUE: 1
PIF_VALUE: 0
PIF_VALUE: 1
PIF_VALUE: 1
PIF_VALUE: 0
PIF_VALUE: 1
PIF_VALUE: 0
PIF_VALUE: 0
PIF_VALUE: 1
PIF_VALUE: 0
PIF_VALUE: 1
PIF_VALUE: 0
PIF_VALUE: 0
PIF_VALUE: 1
PIF_VALUE: 0
PIF_VALUE: 1
PIF_VALUE: 0
PIF_VALUE: 1
PIF_VALUE: 1
PIF_VALUE: 0
PIF_VALUE: 1
PIF_VALUE: 0
PIF_VALUE: 1
PIF_VALUE: 0
PIF_VALUE: 1
PIF_VALUE: 0
PIF_VALUE: 1
PIF_VALUE: 0
PIF_VALUE: 1
PIF_VALUE: 0
PIF_VALUE: 0
PIF_VALUE: 1

## 2020-09-01 ASSESSMENT — PAIN SCALES - GENERAL
PAINLEVEL_OUTOF10: 0
PAINLEVEL_OUTOF10: 0
PAINLEVEL_OUTOF10: 3
PAINLEVEL_OUTOF10: 5
PAINLEVEL_OUTOF10: 0
PAINLEVEL_OUTOF10: 0
PAINLEVEL_OUTOF10: 1
PAINLEVEL_OUTOF10: 6
PAINLEVEL_OUTOF10: 0
PAINLEVEL_OUTOF10: 0
PAINLEVEL_OUTOF10: 1

## 2020-09-01 ASSESSMENT — PAIN - FUNCTIONAL ASSESSMENT: PAIN_FUNCTIONAL_ASSESSMENT: 0-10

## 2020-09-01 NOTE — ANESTHESIA PROCEDURE NOTES
Peripheral Block    Patient location during procedure: pre-op  Start time: 9/1/2020 9:18 AM  End time: 9/1/2020 9:21 AM  Staffing  Anesthesiologist: Marietta Mondragon MD  Other anesthesia staff: Dudley Bocanegra RN  Performed: other anesthesia staff   Preanesthetic Checklist  Completed: patient identified, site marked, surgical consent, pre-op evaluation, timeout performed, IV checked, risks and benefits discussed, monitors and equipment checked, anesthesia consent given, oxygen available and patient being monitored  Peripheral Block  Patient position: supine  Prep: ChloraPrep  Patient monitoring: cardiac monitor, continuous pulse ox, continuous capnometry, frequent blood pressure checks and IV access  Block type: Femoral  Laterality: left  Injection technique: single-shot  Procedures: ultrasound guided  Local infiltration: lidocaine  Infiltration strength: 1 %  Dose: 1 mL  Adductor canal  Provider prep: mask and sterile gloves  Local infiltration: lidocaine  Needle  Needle type: combined needle/nerve stimulator   Needle gauge: 20 G  Needle length: 10 cm  Needle localization: ultrasound guidance  Assessment  Injection assessment: negative aspiration for heme, no paresthesia on injection and local visualized surrounding nerve on ultrasound  Paresthesia pain: none  Slow fractionated injection: yes  Hemodynamics: stable  Medications Administered  Ropivacaine (NAROPIN) injection 0.5%, 40 mL  Reason for block: post-op pain management

## 2020-09-01 NOTE — ANESTHESIA PROCEDURE NOTES
Spinal Block    Patient location during procedure: OR  Start time: 9/1/2020 11:15 AM  End time: 9/1/2020 11:28 AM  Reason for block: primary anesthetic and at surgeon's request  Staffing  Anesthesiologist: Unique Silveira MD  Other anesthesia staff: Brianda Sky RN  Performed: anesthesiologist and other anesthesia staff   Preanesthetic Checklist  Completed: patient identified, site marked, surgical consent, pre-op evaluation, timeout performed, IV checked, risks and benefits discussed, monitors and equipment checked, anesthesia consent given, oxygen available and patient being monitored  Spinal Block  Patient position: sitting  Prep: ChloraPrep  Patient monitoring: cardiac monitor, continuous pulse ox, continuous capnometry and frequent blood pressure checks  Approach: midline  Location: L3/L4  Provider prep: mask, sterile gloves and sterile gown  Local infiltration: lidocaine  Dose: 0.5  Agent: bupivacaine  Adjuvant: fentanyl  Dose: 2  Dose: 2  Needle  Needle type: Quincke   Needle gauge: 22 G  Needle length: 3.5 in  Needle insertion depth: 2 cm  Assessment  Sensory level: T6  Swirl obtained: Yes  CSF: clear  Attempts: 1  Hemodynamics: stable

## 2020-09-01 NOTE — CARE COORDINATION
SS NOTE: SW met with pt and his wife Henry Linares today. Pt has DME at home; standard walker, extended tub bench and a straight cane. Pt agrees to use [facility] and they are following for [facility] orders. Pt plans on dc home with wife tomorrow. Carolina Bell. 09/01/2020.4:39 PM.

## 2020-09-01 NOTE — OP NOTE
Operative report        DATE OF PROCEDURE: 9/1/2020     SURGEON: Chanelle Mc    ASSISTANT: Elfego Daniels    PREOPERATIVE DIAGNOSIS: Degenerative arthrosis left knee    POSTOPERATIVE DIAGNOSIS: Same    OPERATION: Left total knee replacement arthroplasty    ANESTHESIA: Spinal    ESTIMATED BLOOD LOSS: Less than 50 cc    COMPLICATIONS: None    SPECIMENS: Was sent to pathology    HISTORY: The patient is a 81y.o. year old male with history of above preop diagnosis. I explained the risk, benefits, expected outcome, and alternatives to the procedure. Patient understands and is in agreement. PROCEDURE: The patient was brought the operating room after signs are identified. Adequate spinal anesthetic was administered by anesthesia. The patient was placed supine on the operating table. A tourniquet was placed high on the left upper thigh. Leg was then prepped and draped sterile fashion. Straight anterior midline incision was marked on the leg. It was then exsanguinated with elastic wrap with tourniquet pressure set at 300 mmHg. The incision was made and full-thickness medial lateral flaps were elevated. A median parapatellar capsulotomy was performed. The patella was retracted and a portion of the fat pad was removed and the anterior horns of the menisci were removed as well. The patella was then measured for thickness and cut for appropriate thickness replacement and protective metal disc was applied to this. It was then retracted laterally. The knee was then flexed. Osteophytes were removed and some of the hypertrophic synovium was debrided from around the knee for better access and visualization. The femur was drilled IM for stabilization of the distal cutting block set initially at 10 mm resection and 4 degrees valgus. When the block was pinned and checked there was excessive bone cut felt to be present therefore the block was moved 2 mm distal to the normal level of 8 mm. The bone cut was made.   The size of block was then used to size the femur at 5 with the best fit to prevent notching or undersized. The 4-in-1 cutting block was then applied and the anterior and posterior cuts were made as well as the chamfers. The cruciates were sacrificed at this point. The meniscal cartilage remnants were removed as well. These were somewhat shredded up from the degeneration. The tibia was retracted forward and then set up for a proximal cut perpendicular the long axis on AP and at about a 3 degree posterior slope. The cut was made the bone wafer was removed. Sizing was assessed at this point and the tensiometer was used to check flexion extension gaps and also medial lateral balance. The flexion extension gaps were good. It was necessary to do a little posterior medial tibial release to balance out and extension which was very good afterwards. The tibia was set up for the baseplate and size 5 fit this is best.  The plate was pinned. The trial bearing 9 mm in place. The femoral trial component was applied knee was taken through range of motion. There is a little bit of instability and laxity therefore the 10 mm bearing was exchanged on the tibial component and the knee was much more stable and balanced in flexion and extension. This is chosen as a final size. The patella was prepared for 27 mm symmetric poly-bearing and capsules closed with towel clips and taken through range of motion. Tracking of the patellofemoral joint is very good as well and there was no excess tension here. Femur trial in the tibial bearing were removed. The fin broach was used to prepare the tibia. The box cut jig was then placed on the femur and a box cut was made for the posterior stabilized component. The trials and cutting jigs were removed and the knee was thoroughly irrigated. Local anesthetic was injected surrounding soft tissues for postop pain control.   A bone plug placed in the distal femoral canal.  Cement was then mixed semi-viscus and all components were cemented. Excess cement was removed during initial impaction after partial set up. The tibial bearing was snapped securely in place. The patella was held with self-retaining clamp as the cement set up in the knee was irrigated. Vancomycin powder was then placed in the joint. The capsule was closed with 0 Vicryl figure-of-eight sutures. Patellofemoral tracking was good. Tranexamic acid solution was injected in the knee joint. The subcu was irrigated and then the remaining vancomycin powder was placed here and closed with 2-0 Vicryl. Steri-Strips further secured to skin. Aquasol AG and a bulky White type dressing were applied. The tourniquet was deflated good circulation turned to lower extremity. Patient was then taken recovery in stable condition. GROSS PATHOLOGY: Advanced degeneration left knee with bony sclerosis and bony eburnation medial compartment left knee with erosions medial femoral condyle. Chondral softening erosion. Meniscal fragmentation. Capsular contracture. Varus malalignment. COMPONENTS USED : J[employer]ad [employer] triathlon primary cemented knee components. Size 5 tibial baseplate. 10 mm posterior stabilized tibial bearing. Left posterior stabilized cemented left femoral component. 27 mm symmetric poly-patella. [employer] Simplex P bone cement.     All reasonable efforts have been made to minimize the risk of errors that may occur in the use of voice recognition and other electronic means of charting.'        Electronically signed by Robbi Amaya DO on 9/1/20 at 1:23 PM EDT

## 2020-09-01 NOTE — PROGRESS NOTES
Physical Therapy    Physical Therapy Initial Evaluation    Room #:  0320/0320-01  Patient Name: Janine Wells  YOB: 1950  MRN: [id number]    Referring Provider: Martin Antunez DO     Date of Service: 9/1/2020    Evaluating Physical Therapist:  Lisa Parks PT  Lic.  # [id number]      Diagnosis:   Arthritis of left knee [M17.12]    status post Left Total knee arthroplasty (TKA)    Patient Active Problem List   Diagnosis    S/P knee replacement    Parkinsonism ([facility]    Arthritis of left knee        Tentative placement recommendation: Home Health PT 5 days a week  Equipment recommendation: Harper Kelly      Prior Level of Function: Patient ambulated independently    Rehab Potential: good  for baseline    Past medical history:  Past Medical History:   Diagnosis Date    Parkinson's disease ([facility]      Past Surgical History:   Procedure Laterality Date    DEEP BRAIN STIMULATOR PLACEMENT      L chest    FRACTURE SURGERY      nose    JOINT REPLACEMENT Right 05/16/2017    knee    KNEE ARTHROSCOPY Right     TONSILLECTOMY         Precautions: falls ,  FWB (full weight bearing) urinary incontinence    SUBJECTIVE:    Social history: Patient lives with spouse  in a ranch home  with 1 step  to enter without Rail  Tub shower     Equipment owned: Cane, [address] and Tub transfer bench,       [address] [facility]   How much difficulty turning over in bed?: A Lot  How much difficulty sitting down on / standing up from a chair with arms?: A Lot  How much difficulty moving from lying on back to sitting on side of bed?: A Lot  How much help from another person moving to and from a bed to a chair?: Total  How much help from another person needed to walk in hospital room?: Total  How much help from another person for climbing 3-5 steps with a railing?: Total  AM-PAC Inpatient Mobility Raw Score : 9  AM-[facility] Inpatient T-Scale Score : 30.55  Mobility Inpatient CMS 0-100% Score: 81.38  Mobility Inpatient CMS G-Code Modifier :     Nursing cleared patient for PT evaluation. The admitting diagnosis and active problem list as listed above have been reviewed prior to the initiation of this evaluation. OBJECTIVE:   Initial Evaluation  Date: 9/1/20 Treatment Date: Short Term/ Long Term   Goals   Was pt agreeable to Eval/treatment? Yes     Pain Level  0/10   spinal intact      Bed Mobility  Rolling: Moderate assist of 1    Supine to sit: Moderate assist of 1    Sit to supine: Moderate assist of 1    Scooting: Moderate assist of 1    Rolling: Independent    Supine to sit: Independent    Sit to supine: Independent    Scooting: Independent     Transfers Sit to stand:  Moderate assist of  2 for proper hand and foot placement, forward wt shift   Sit to stand: Modified Independent     Ambulation    1-2 side steps using  wheeled walker with Moderate assist of  2   for balance and walker control; posterior lean and incontinence  100 feet using  wheeled walker with Modified Independent    Stair negotiation: ascended and descended   Not assessed       1 step with no rail supervision    ROM Within functional limits except Left knee 10-60° with pronounced rigidity and poor motor control d/t spinal and bradykinesia; pateint is able to flex 2/5 trials    Increase range of motion 10% of affected joints    Strength bilateral upper extremities See OT note   Right lower extremity 3/5  Left lower extremity 2/5  Within functional limits   Balance Sitting EOB:  poor  D/t decreased sensation from spinal  Dynamic Standing:  poor  wheeled walker   Sitting EOB:  good    Dynamic Standing:  good wheeled walker      Patient is Alert & Oriented x person, place, time and situation and follows directions    Sensation:  Patient reports numbness and tingling to extremities bilateral lower extremities   Edema:  no       Patient education  Patient educated on role of Physical Therapy, risks of immobility, safety and plan of care, ankle pumps, quad set and glut set for edema control, blood clot prevention and weight bearing status       Patient response to education:   Pt verbalized understanding Pt demonstrated skill Pt requires further education in this area   Yes Partial Yes     ASSESSMENT: Patient exhibits decreased strength, balance, coordination impairing functional mobility. Bradykinesia will be barrier for recovery however pateint and wife are willing to fully participate to increase rom left knee and strengthen bilateral lower extremities for safe and indep trfs and mobility    Treatment:  Patient practiced and was instructed in the following treatment:   Sit to stand for wt shift forward and smooth transition to wt bear onto bilateral lower extremities with use of wheeled walker   Therapist educated and facilitated patient on techniques to increase safety and independence with bed mobility, balance, functional transfers, and functional mobility. Instruction knee extension in bed with kneecap and toes pointing to ceiling  Instruction and performance of incentive inspirometer. Patient performed ankle pumps, quad sets, glut sets x 10 each. Active assist heelslide,  With significant resistance x 3/5 reps   Sat edge of bed 10 minutes with Minimal assist of 1 to increase dynamic sitting balance and activity tolerance. Skilled intervention of p.t. and ot required for management of bradykinesia, diminished sensation and incontinency and safe interaction to anticipate areas of difficulty        Patient's/ family goals   home        Patient and or family understand(s) diagnosis, prognosis, and plan of care. PLAN:    Patient would benefit from continued skilled Physical Therapy to improve functional independence and quality of life.    Incorporating exercise, medication timing and guidance for functional mobiltiy    Frequency of treatments: Patient will be seen  twice daily  for therapeutic exercise, functional retraining, endurance activities, balance exercises, family and patient education. To strengthen Left lower extremity and increase rom to allow for safe and independent transfers and gait    Time in  306  Time out  338    Total Treatment Time  15 minutes    Evaluation time includes thorough review of current medical information, gathering information on past medical history/social history and prior level of function, completion of standardized testing/informal observation of tasks, assessment of data, and development of Plan of care and goals. CPT codes:   Moderate Complexity PT evaluation (28635)  Therapeutic exercises (75590)   15 minutes  11 unit(s)    Jaguar Amezquita PT

## 2020-09-01 NOTE — H&P
Department of Internal Medicine  History and Physical     Admitting Physician: Dr. Vikas Richter  Consultants: Dr. Nam Pierre:  OA left knee    HISTORY OF PRESENT ILLNESS:    Wes Berg is a 82-year-old gentleman who presented to the hospital today with plans for left knee replacement in the setting of longstanding osteoarthritis. We were asked to provide consultation for medical management and surgical clearance. The patient has an extensive history that includes Parkinson's disease. He underwent stimulator placement in [facility] [employer] for this process. He has the ability to shot this mechanism off during the surgical intervention and this is recommended. Otherwise, he is healthy at baseline. He denies cardiovascular disease. He denies any hypertension. He last underwent orthopedic intervention for right knee osteoarthritis and tolerated replacement without complication. He has no complaints today and is resting comfortably. PAST MEDICAL Hx:  Past Medical History:   Diagnosis Date    Parkinson's disease ([facility]        PAST SURGICAL Hx:   Past Surgical History:   Procedure Laterality Date    DEEP BRAIN STIMULATOR PLACEMENT      L chest    FRACTURE SURGERY      nose    JOINT REPLACEMENT Right 05/16/2017    knee    KNEE ARTHROSCOPY Right     TONSILLECTOMY         FAMILY Hx:  No family history on file. HOME MEDICATIONS:  Prior to Admission medications    Medication Sig Start Date End Date Taking?  Authorizing Provider   carbidopa-levodopa (SINEMET)  MG per tablet Take 2 tablets by mouth 4 times daily 6/25/20  Yes Historical Provider, MD   sertraline (ZOLOFT) 50 MG tablet TAKE 1 2 TABLET BY MOUTH ONCE DAILY FOR 7 DAYS THEN INCREASE TO 1 TABLET DAILY THEREAFTER 7/23/20   Historical Provider, MD   loratadine (CLARITIN) 10 MG tablet Take 10 mg by mouth daily    Historical Provider, MD   ibuprofen (ADVIL;MOTRIN) 600 MG tablet Take 600 mg by mouth every 6 hours as needed for Pain    Historical Denies change in bowel habits or stools. Genito-Urinary:    Denies any urgency, frequency, hematuria. Voiding without difficulty. Musculoskeletal:   Denies joint pain, joint stiffness, joint swelling or muscle pain    Neurology:    Denies any headache or focal neurological deficits. No weakness or paresthesia. Derm:    Denies any rashes, ulcers, or excoriations. Denies bruising. Extremities:   Admits to chronic left knee plain. PHYSICAL EXAM:  VITALS:  Vitals:    09/01/20 0810   BP: (!) 148/88   Pulse: 61   Resp: 16   Temp: 97.6 °F (36.4 °C)   SpO2: 98%         CONSTITUTIONAL:    Awake, alert, cooperative, no apparent distress, and appears stated age    EYES:    PERRL, EOMI, sclera clear, conjunctiva normal    ENT:    Normocephalic, atraumatic, sinuses nontender on palpation. External ears without lesions. Oral pharynx with moist mucus membranes. Tonsils without erythema or exudates. NECK:    Supple, symmetrical, trachea midline, no adenopathy, thyroid symmetric, not enlarged and no tenderness, skin normal, no bruits, no JVD    HEMATOLOGIC/LYMPHATICS:    No cervical lymphadenopathy and no supraclavicular lymphadenopathy    LUNGS:    Symmetric. No increased work of breathing, good air exchange, clear to auscultation bilaterally, no wheezes, rhonchi, or rales,     CARDIOVASCULAR:    Normal apical impulse, regular rate and rhythm, normal S1 and S2, no S3 or S4, and no murmur noted    ABDOMEN:    No scars, normal bowel sounds, soft, non-distended, non-tender, no masses palpated, no hepatosplenomegaly, no rebound or guarding elicited on palpation     MUSCULOSKELETAL:    There is no redness, warmth, or swelling of the joints. Full range of motion noted. Motor strength is 5 out of 5 all extremities bilaterally. Tone is normal.    NEUROLOGIC:    Obvious signs of Parkinson's disease. SKIN:    No bruising or bleeding.   No redness, warmth, or swelling    EXTREMITIES:    Peripheral pulses

## 2020-09-01 NOTE — PROGRESS NOTES
OCCUPATIONAL THERAPY  Initial Evaluation  Date:2020  Patient Name: Hailey Luna  MRN: [id number]  : 1950  ROOM #: 0320/0320-01     Referring Provider: Ladi Dean DO  Evaluating OT: Remington Huddleston OTR/L [id number]    Placement Recommendation: [address]   Recommended Adaptive Equipment: possible a wheeled walker    [facility]   AM-PAC Daily Activity Inpatient   How much help for putting on and taking off regular lower body clothing?: Total  How much help for Bathing?: A Lot  How much help for Toileting?: A Lot  How much help for putting on and taking off regular upper body clothing?: A Lot  How much help for taking care of personal grooming?: A Lot  How much help for eating meals?: None  AM-PAC Inpatient Daily Activity Raw Score: 13  AM-PAC Inpatient ADL T-Scale Score : 32.03  ADL Inpatient CMS 0-100% Score: 63.03  ADL Inpatient CMS G-Code Modifier : CL    Diagnosis:   1. Preop testing      Pertinent Medical History:   Past Medical History:   Diagnosis Date    [facility]'s disease ([facility]       Precautions:  falls, full weight bearing: L LE d/t TKA, Parkinson's Disease   Pain Scale: Numeric Rate: no pain; Nursing notified. Social history: with family: spouse, Julien Rogel: yes  Home architecture: single family home, 1 story, 1 step to enter with no rail, tub shower. PLOF: independent with BADL and IADL, ambulated with no device   Equipment owned: standard walker, straight cane, bedside commode (borrow), tub transfer bench  Cognition: oriented x 4; follows 3 step directions.     fair  Problem solving skills   fair  Memory    fair  Sequencing  Communication: intact   Visual perceptual skills: intact     Glasses: no   Edema: no    Sensation: intact   Hand Dominance:  Left     X  Right     Left Right Comment   Passive range of motion [facility]     Active range of motion [facility]     Muscle Grade 4/5 4/5    /pinch Strength Intact  Intact     Additional Information: Good  and wfl FMC/dexterity noted and don hospital gown x 2 reps as pt was incontinent of urine, assistance to bathe lower body, urinal placed between B LE's until pt regains sensation. Clinic tomorrow at 9:00am with patient and spouse. Education provided for proper positioning of lower extremity in bed to prevent contractures. All needs within reach. Pt would benefit from continued skilled OT to increase safety and independence with completion of ADL/IADL tasks for functional independence and quality of life. Evaluation Complexity:  · Low Complexity  · History: Brief history including review of medical records relating to the problem  · Exam: 1-3 performance Deficits  · Assistance/Modification: No assistance or modifications required to perform tasks. No comorbities affecting occupational performance. Assessment of current deficits   Functional mobility [x]        ADLs [x]        Strength [x]      Cognition []  Functional transfers  [x]       IADLs [x]        Safety Awareness []      Endurance [x]  Fine Motor Coordination []       Balance [x]       Vision/perception []     Sensation []   Gross Motor Coordination []       ROM []         Delirium []                          Motor Control []    Plan of Care: OT 1-3x/week PRN during hospitalization  ADL retraining [x]   Equipment needs [x]   Neuromuscular re-education [] Energy Conservation Techniques [x]  Functional Transfer training [x] Patient and/or Family Education [x]  Functional Mobility training [x]  Environmental Modifications []  Cognitive re-training []   Compensatory techniques for ADLs [x]  Splinting Needs []   Positioning to improve overall function [x]   Therapeutic Activity [x]  Therapeutic Exercise  []  Visual/Perceptual: []    Delirium prevention/treatment  []  Other:  []    Rehab Potential: Good for established goals developed with patient and family.       Patient / Family Goal: return home      Patient and/or family were instructed on functional diagnosis, prognosis/goals and OT plan of care. Demonstrated fair understanding. Time In:3:20pm   Time Out: 3:45pm                Treatment Charges: Mins Units   ADL/Home Mgt                    [id number]    Therapeutic Activities          29089 8    Therapeutic Exercise          27101     Manual Therapy                  42477     Neuro Re-ed                       95607     Orthotic manage/training    97357     Total Timed Treatment 18 1     Evaluation time includes thorough review of current medical information, gathering information on past medical history/social history and prior level of function, completion of standardized testing/informal observation of tasks, assessment of data, and development of POC/Goals.     Nanette Coronel OTR/L [id number]

## 2020-09-01 NOTE — H&P
History and Physical      CHIEF COMPLAINT: Left knee pain with limp and deformity    HISTORY OF PRESENT ILLNESS:      Progression over time    Past Medical History:        Diagnosis Date    Parkinson's disease ([address] [address]      Past Surgical History:        Procedure Laterality Date    DEEP BRAIN STIMULATOR PLACEMENT      L chest    FRACTURE SURGERY      nose    JOINT REPLACEMENT Right 05/16/2017    knee    KNEE ARTHROSCOPY Right     TONSILLECTOMY       Social History:    TOBACCO:   reports that he has never smoked. He quit smokeless tobacco use about 15 years ago. ETOH:   reports no history of alcohol use. DRUGS:   reports no history of drug use. Family History:   No family history on file. Medications Prior to Admission:  No medications prior to admission. Allergies:  Seasonal    CONSTITUTIONAL:  negative for  chills and anorexia  HEENT:  negative for  tinnitus  RESPIRATORY:  negative for  dyspnea and cyanosis  CARDIOVASCULAR:  negative for  palpitations, syncope  GASTROINTESTINAL:  negative for vomiting and hematemesis  GENITOURINARY:  negative for hematuria  ENDOCRINE:  negative for cold intolerance and hair loss  MUSCULOSKELETAL:  positive for  arthralgias, stiff joints and decreased range of motion  NEUROLOGICAL:  positive for coordination problems and tremor  BEHAVIOR/PSYCH:  negative for agitated and anxiety    PHYSICAL EXAM:  There were no vitals taken for this visit. General appearance:  awake, alert, cooperative, no apparent distress, and appears stated age  Neurologic:  Awake, alert, oriented to name, place and time. Cranial nerves II-XII are grossly intact. Motor is 5 out of 5 bilaterally. Cerebellar finger to nose, heel to shin intact. Sensory is intact.   Babinski down going, Romberg negative, and gait is normal.  Lungs:  No increased work of breathing, good air exchange, clear to auscultation bilaterally, no crackles or wheezing  Heart:  Normal apical impulse, regular rate and rhythm, normal S1 and S2, no S3 or S4, and no murmur noted  Abdomen:  normal bowel sounds  Skin: warm and dry, no rash or erythema  ENT: tympanic membrane, external ear and ear canal normal bilaterally, oropharynx clear and moist with normal mucous membranes  Musculoskeletal: Decreased range of motion left knee with varus malalignment limp.     General Labs:  CBC:   Lab Results   Component Value Date    WBC 4.4 08/31/2020    RBC 4.64 08/31/2020    HGB 13.8 08/31/2020    HCT 41.6 08/31/2020    MCV 89.7 08/31/2020    RDW 12.1 08/31/2020     08/31/2020     CMP:    Lab Results   Component Value Date     08/31/2020    K 4.4 08/31/2020     08/31/2020    CO2 27 08/31/2020    BUN 14 08/31/2020    PROT 5.6 05/17/2017     U/A:  No components found for: Marylou Dunham, USPGRAV, UPH, UPROTEIN, Dequan Cobia, UKETONE, UBILI, UBLOOD, Marlo, UUROBIL, [place], [facility], [place], [facility], [facility], [place]    Radiology: Advanced degeneration left knee    ASSESSMENT AND PLAN:    Total left knee replacement arthroplasty      Electronically signed by Karina Foley DO on 9/1/2020 at 7:07 AM

## 2020-09-01 NOTE — PROGRESS NOTES
0914 time out done consents signed. Patient medicated as ordered. adducutor canal block completed. Patient tolerated well.

## 2020-09-02 LAB
EKG ATRIAL RATE: 59 BPM
EKG P AXIS: 19 DEGREES
EKG P-R INTERVAL: 128 MS
EKG Q-T INTERVAL: 414 MS
EKG QRS DURATION: 68 MS
EKG QTC CALCULATION (BAZETT): 409 MS
EKG R AXIS: 0 DEGREES
EKG T AXIS: 4 DEGREES
EKG VENTRICULAR RATE: 59 BPM
HCT VFR BLD CALC: 36 % (ref 37–54)
HEMOGLOBIN: 12.3 G/DL (ref 12.5–16.5)

## 2020-09-02 PROCEDURE — 85018 HEMOGLOBIN: CPT

## 2020-09-02 PROCEDURE — 97116 GAIT TRAINING THERAPY: CPT

## 2020-09-02 PROCEDURE — 6370000000 HC RX 637 (ALT 250 FOR IP): Performed by: ORTHOPAEDIC SURGERY

## 2020-09-02 PROCEDURE — 97530 THERAPEUTIC ACTIVITIES: CPT

## 2020-09-02 PROCEDURE — 36415 COLL VENOUS BLD VENIPUNCTURE: CPT

## 2020-09-02 PROCEDURE — G0378 HOSPITAL OBSERVATION PER HR: HCPCS

## 2020-09-02 PROCEDURE — 51798 US URINE CAPACITY MEASURE: CPT

## 2020-09-02 PROCEDURE — 85014 HEMATOCRIT: CPT

## 2020-09-02 PROCEDURE — 97110 THERAPEUTIC EXERCISES: CPT

## 2020-09-02 PROCEDURE — 51701 INSERT BLADDER CATHETER: CPT

## 2020-09-02 PROCEDURE — 6360000002 HC RX W HCPCS: Performed by: ORTHOPAEDIC SURGERY

## 2020-09-02 PROCEDURE — 6370000000 HC RX 637 (ALT 250 FOR IP): Performed by: INTERNAL MEDICINE

## 2020-09-02 PROCEDURE — 96374 THER/PROPH/DIAG INJ IV PUSH: CPT

## 2020-09-02 RX ORDER — TAMSULOSIN HYDROCHLORIDE 0.4 MG/1
0.4 CAPSULE ORAL DAILY
Status: DISCONTINUED | OUTPATIENT
Start: 2020-09-02 | End: 2020-09-03 | Stop reason: HOSPADM

## 2020-09-02 RX ADMIN — OXYCODONE HYDROCHLORIDE 10 MG: 5 TABLET ORAL at 13:44

## 2020-09-02 RX ADMIN — TAMSULOSIN HYDROCHLORIDE 0.4 MG: 0.4 CAPSULE ORAL at 22:22

## 2020-09-02 RX ADMIN — DOCUSATE SODIUM - SENNOSIDES 1 TABLET: 50; 8.6 TABLET, FILM COATED ORAL at 09:14

## 2020-09-02 RX ADMIN — SERTRALINE HYDROCHLORIDE 25 MG: 50 TABLET ORAL at 20:03

## 2020-09-02 RX ADMIN — CARBIDOPA AND LEVODOPA 2 TABLET: 25; 100 TABLET ORAL at 12:01

## 2020-09-02 RX ADMIN — ACETAMINOPHEN 650 MG: 325 TABLET, FILM COATED ORAL at 16:00

## 2020-09-02 RX ADMIN — MORPHINE SULFATE 3 MG: 4 INJECTION, SOLUTION INTRAMUSCULAR; INTRAVENOUS at 00:00

## 2020-09-02 RX ADMIN — CETIRIZINE HYDROCHLORIDE 10 MG: 10 TABLET, FILM COATED ORAL at 09:13

## 2020-09-02 RX ADMIN — OXYCODONE HYDROCHLORIDE 5 MG: 5 TABLET ORAL at 09:12

## 2020-09-02 RX ADMIN — CARBIDOPA AND LEVODOPA 2 TABLET: 25; 100 TABLET ORAL at 16:25

## 2020-09-02 RX ADMIN — ASPIRIN 325 MG: 325 TABLET, DELAYED RELEASE ORAL at 09:12

## 2020-09-02 RX ADMIN — CARBIDOPA AND LEVODOPA 2 TABLET: 25; 100 TABLET ORAL at 07:39

## 2020-09-02 RX ADMIN — CARBIDOPA AND LEVODOPA 2 TABLET: 25; 100 TABLET ORAL at 20:03

## 2020-09-02 RX ADMIN — ACETAMINOPHEN 650 MG: 325 TABLET, FILM COATED ORAL at 09:14

## 2020-09-02 RX ADMIN — OXYCODONE HYDROCHLORIDE 10 MG: 5 TABLET ORAL at 03:00

## 2020-09-02 ASSESSMENT — PAIN DESCRIPTION - DESCRIPTORS
DESCRIPTORS: ACHING;DISCOMFORT;DULL
DESCRIPTORS: ACHING;DULL
DESCRIPTORS: ACHING;DISCOMFORT;DULL

## 2020-09-02 ASSESSMENT — PAIN SCALES - GENERAL
PAINLEVEL_OUTOF10: 7
PAINLEVEL_OUTOF10: 5
PAINLEVEL_OUTOF10: 9
PAINLEVEL_OUTOF10: 7
PAINLEVEL_OUTOF10: 9
PAINLEVEL_OUTOF10: 7
PAINLEVEL_OUTOF10: 2
PAINLEVEL_OUTOF10: 1
PAINLEVEL_OUTOF10: 1

## 2020-09-02 ASSESSMENT — PAIN DESCRIPTION - ONSET
ONSET: ON-GOING

## 2020-09-02 ASSESSMENT — PAIN DESCRIPTION - PAIN TYPE
TYPE: SURGICAL PAIN

## 2020-09-02 ASSESSMENT — PAIN DESCRIPTION - LOCATION
LOCATION: KNEE

## 2020-09-02 ASSESSMENT — PAIN DESCRIPTION - PROGRESSION
CLINICAL_PROGRESSION: GRADUALLY WORSENING
CLINICAL_PROGRESSION: NOT CHANGED
CLINICAL_PROGRESSION: GRADUALLY WORSENING

## 2020-09-02 ASSESSMENT — PAIN DESCRIPTION - FREQUENCY
FREQUENCY: INTERMITTENT

## 2020-09-02 ASSESSMENT — PAIN DESCRIPTION - ORIENTATION
ORIENTATION: LEFT

## 2020-09-02 ASSESSMENT — PAIN - FUNCTIONAL ASSESSMENT
PAIN_FUNCTIONAL_ASSESSMENT: PREVENTS OR INTERFERES SOME ACTIVE ACTIVITIES AND ADLS

## 2020-09-02 NOTE — PROGRESS NOTES
Internal Medicine Progress Note    [facility]    Price Kinney. Jada Carroll., F.A.C.OTEE. Audrey Antunez D.O., VIVI Moore D.O. Claudia Hess, MSN, APRN, NP-C  Anamika Martin. Emmett Alvarez, MSN, APRN-CNP     Primary Care Physician: No primary care provider on file. Admitting Physician:  Araseli Tolentino DO  Admission date and time: 9/1/2020  7:30 AM    Room:  [address] [place] [address]  Admitting diagnosis: Arthritis of left knee [M17.12]  Arthritis of left knee [M17.12]    Patient Name: Joe Sykes  MRN: [id number]    Date of Service: 9/2/2020     Subjective:  Renata Alba tolerated orthopedic intervention yesterday without complication. He did develop some visual issues yesterday which is frequent for him with his underlying Parkinson's. He is also dealing with urinary retention. He is anxious for discharge home but understands the gravity of his situation. He will work aggressively with the therapy teams today. No family members were present during my examination. We discussed discharge planning. Review of System:   Constitutional:   Denies fever or chills, weight loss or gain, fatigue or malaise. HEENT:   Denies ear pain, sore throat, sinus or eye problems. Cardiovascular:   Denies any chest pain, irregular heartbeats, or palpitations. Respiratory:   Denies shortness of breath, coughing, sputum production, hemoptysis, or wheezing. Gastrointestinal:   Denies nausea, vomiting, diarrhea, or constipation. Denies any abdominal pain. Genitourinary:    Admits to urinary retention. Extremities:   Denies lower extremity swelling, edema or cyanosis. Neurology:    Chronic deficits related to Parkinson's disease. Psch:   Denies being anxious or depressed. Musculoskeletal:    Denies  myalgias, joint complaints or back pain. Integumentary:   Denies any rashes, ulcers, or excoriations. Denies bruising. Hematologic/Lymphatic:  Denies bruising or bleeding.     Physical Exam:  I/O this shift:  In: -   Out: 775 [Urine:775]    Intake/Output Summary (Last 24 hours) at 9/2/2020 0815  Last data filed at 9/2/2020 0714  Gross per 24 hour   Intake 2712 ml   Output 875 ml   Net 1837 ml   I/O last 3 completed shifts: In: 3988 [P.O.:120; I.V.:2592]  Out: 100 [Urine:100]  No data found. Vital Signs:   Blood pressure 136/86, pulse 68, temperature 97.8 °F (36.6 °C), temperature source Oral, resp. rate 16, SpO2 94 %. General appearance:  Alert, responsive, oriented to person, place, and time. Well preserved, alert, no distress. Head:  Normocephalic. No masses, lesions or tenderness. Eyes:  PERRLA. EOMI. Sclera clear. Buccal mucosa moist.  ENT:  Ears normal. Mucosa normal.  Neck:    Supple. Trachea midline. No thyromegaly. No JVD. No bruits. Heart:    Rhythm regular. Rate controlled. No murmurs. Lungs:    Symmetrical. Clear to auscultation bilaterally. No wheezes. No rhonchi. No rales. Brain stimulator can be visualized in the chest wall. Abdomen:   Soft. Non-tender. Non-distended. Bowel sounds positive. No organomegaly or masses. No pain on palpation. Extremities:    Peripheral pulses present. Postoperative dressings were in place. Neurologic:    Alert x 3. No focal deficit. Cranial nerves grossly intact. No focal weakness. Psych:   Behavior is normal. Mood appears normal. Speech is not rapid and/or pressured. Musculoskeletal:   Spine ROM normal. Muscular strength intact. Gait not assessed. Integumentary:  No rashes  Skin normal color and texture.   Genitalia/Breast:  Deferred    Medication:  Scheduled Meds:   cetirizine  10 mg Oral Daily    carbidopa-levodopa  2 tablet Oral 4x Daily    sertraline  25 mg Oral Daily    sodium chloride flush  10 mL Intravenous 2 times per day    acetaminophen  650 mg Oral Q6H    sennosides-docusate sodium  1 tablet Oral BID    aspirin  325 mg Oral Daily     Continuous Infusions:    Objective Data:  CBC with Differential:    Lab Results   Component Value Date    WBC 4.4 08/31/2020    RBC 4.64 08/31/2020    HGB 12.3 09/02/2020    HCT 36.0 09/02/2020     08/31/2020    MCV 89.7 08/31/2020    MCH 29.7 08/31/2020    MCHC 33.2 08/31/2020    RDW 12.1 08/31/2020    LYMPHOPCT 6.0 05/17/2017    PROMYELOPCT 0.5 05/17/2017    MONOPCT 7.0 05/17/2017    BASOPCT 0.5 05/17/2017    MONOSABS 0.57 05/17/2017    LYMPHSABS 0.49 05/17/2017    EOSABS 0.00 05/17/2017    BASOSABS 0.04 05/17/2017     BMP:    Lab Results   Component Value Date     08/31/2020    K 4.4 08/31/2020     08/31/2020    CO2 27 08/31/2020    BUN 14 08/31/2020    LABALBU 3.4 05/17/2017    CREATININE 0.6 08/31/2020    CALCIUM 8.9 08/31/2020    GFRAA >60 08/31/2020    LABGLOM >60 08/31/2020    GLUCOSE 95 08/31/2020       Assessment:  1. Osteoarthritis of the left knee with plans for knee replacement  2. Parkinson's disease on multiple Parkinson's medications with underlying brain stimulator    Plan:   Gabriel Tabares suffers from severe Parkinson's disease at baseline. His Parkinson's medications have been resumed. He is dealing with significant urinary retention this morning and has been straight cathed x1. I hope that as he becomes more active with the therapy teams today, he will have return of bladder function. This will be monitored closely. We will perform frequent bladder scans. Otherwise, he will work with the therapy teams and his ultimate goal is to return home. Pending his progression throughout the day, he will be considered for discharge home later this afternoon versus tomorrow. Continue current therapy. See orders for further plan of care. More than 50% of my time was spent at the bedside counseling/coordinating care with the patient and/or family with face to face contact. This time was spent reviewing notes and laboratory data as well as instructing and counseling the patient.  Time I spent with the family or surrogate(s) is included only if the patient was

## 2020-09-02 NOTE — PROGRESS NOTES
Physical Therapy    Physical Therapy Treatment Note    Room #:  0320/0320-01  Patient Name: Christie Watson  YOB: 1950  MRN: [id number]    Referring Provider: Luisa Leyva DO   Date of Service: 9/2/2020  Evaluating Physical Therapist:  Nikky Perez, PT  Lic.  # [id number]     Diagnosis:   Arthritis of left knee [M17.12]  Arthritis of left knee [M17.12] status post Left Total knee arthroplasty (TKA)    Patient Active Problem List   Diagnosis    S/P knee replacement    [facility]ism ([facility]    Arthritis of left knee      Tentative placement recommendation: Home Health PT 5 days a week with 24/7 family assist vs. Subcute  Equipment recommendation: Marcy An      Prior Level of Function: Patient ambulated independently    Rehab Potential: good for baseline    Past medical history:  Past Medical History:   Diagnosis Date    Parkinson's disease ([facility]      Past Surgical History:   Procedure Laterality Date    DEEP BRAIN STIMULATOR PLACEMENT      L chest    FRACTURE SURGERY      nose    JOINT REPLACEMENT Right 05/16/2017    knee    KNEE ARTHROSCOPY Right     TONSILLECTOMY      TOTAL KNEE ARTHROPLASTY Left 9/1/2020    TOTAL LEFT KNEE REPLACEMENT/ ARTHROPLASTY (JOLENE) performed by Luisa Leyva DO at [facility] OR     Precautions: falls,  FWB (full weight bearing), urinary incontinence, Parkinson's    SUBJECTIVE:    Social history: Patient lives with spouse in a ranch home  with 1 step  to enter without Rail, Tub shower     Equipment owned: [employer], [address] and Tub transfer bench,       [address] [facility] [place]   How much difficulty turning over in bed?: A Little  How much difficulty sitting down on / standing up from a chair with arms?: A Little  How much difficulty moving from lying on back to sitting on side of bed?: A Little  How much help from another person moving to and from a bed to a chair?: A Little  How much help from another person needed to walk negotiation. Cues were also required to correct gait pattern; including pushing off at toe-off to encourage knee flexion during swing phase of gait. 100 feet using  wheeled walker with Modified Independent    Stair negotiation: ascended and descended   Not assessed    Patient ascended and descended 5 steps x 2 reps holding onto 2 rails with Minimal assistance of 2. Verbal cues were given for safety, pacing, and technique. 1 step with no rail supervision    ROM Within functional limits except Left knee 10-60° with pronounced rigidity and poor motor control d/t spinal and bradykinesia; pateint is able to flex 2/5 trials  Increase range of motion 10% of affected joints    Strength bilateral upper extremities See OT note   Right lower extremity 3/5  Left lower extremity 2/5  Within functional limits   Balance Sitting EOB:  poor  D/t decreased sensation from spinal  Dynamic Standing:  poor  wheeled walker  Sitting EOB: good   Dynamic Standing: fair using wheeled walker Sitting EOB:  good    Dynamic Standing:  good wheeled walker      Patient is Alert & Oriented x person, place, time and situation and follows directions    Patient education  Patient and family was educated and facilitated on techniques to increase safety and independence with bed mobility, balance, functional transfers, and functional mobility. Patient was explained the the benefits of mobility and risks of immobility. Educated patient on importance and purpose of adaptive device and adjusted to proper height for the patient. Patient was provided and continues to require verbal cues to increase stride length and jessie to normalize gait mechanics as well as to improve stability to prevent fall risk.       Patient response to education:   Pt verbalized understanding Pt demonstrated skill Pt requires further education in this area    Yes Partial Yes      Treatment: Patient practiced and was instructed in the following treatment:      Patient performed below exercises. Patient transferred to side of bed and sat up x 5 minutes to improve endurance, dynamic sitting balance and activity tolerance. Patient stood and ambulated to chair. Patient had a seated rest break in chair and ambulated to wheelchair in hallway. Patient stated he needed to urinate so he ambulated back in room to use urinal. Patient ambulated back to wheelchair and was transferred to 5th floor clinic. Patient ambulated to staircase and performed stairs. Patient was left in care of [address] [place] [address]. Therapeutic Exercises: Patient performed AROM/AAROM ankle pumps, quad sets, glut sets, heel slide, hip abduction/adduction and straight leg raise x 10 reps. Verbal cues were given for correct technique and to perform 2-3x daily. At end of session, patient was in wheelchair and left in care of [address] [place] [address], call light and phone within reach, all lines were intact, and nursing was notified. ASSESSMENT:  Patient was able to progress ambulation distance and perform stairs but required consistent cueing for proper technique's and walker management. Patient had poor follow through with freezing gait due to Parkinson's where patient's wife stated they were worse than typical due today due to \"the stress from everything\". Patient required extensive time and cueing to complete functional mobility. Patient also displayed limited knee flexion as well as extension. Encouraged subacute rehab to patient and family but patient's wife stated she felt confident with helping patient at home. Patient would benefit from continued skilled Physical Therapy to improve functional independence and quality of life. PLAN:  Patient is making good progress towards established goals. Will continue with exercises and functional mobility practice to improve gait and quad strength to improve motor recruitment in order to achieve greater knee extension.  Patient and family education will also continue to be administered as needed.        Time in: 8:22  Time out: 9:45    Total Treatment Time: 80 minutes    CPT codes:     minutes units   Gait training ([id number]) 30 2   Therapeutic activities (60152 15 1   Therapeutic exercise (17540) 16  1   Neuro-facilitation techniques (75426) 0 0   No treatment 19 0   TOTAL MINUTES 80  4     Patirosi Zamarripa, PTA

## 2020-09-02 NOTE — PROGRESS NOTES
Physical Therapy    Physical Therapy Treatment Note    Room #:  0320/0320-01  Patient Name: Amanda Reveles  YOB: 1950  MRN: [id number]    Referring Provider: Robbi Amaya DO   Date of Service: 9/2/2020  Evaluating Physical Therapist:  Marc Arredondo PT  Lic.  # [id number]     Diagnosis:   Arthritis of left knee [M17.12]  Arthritis of left knee [M17.12] status post Left Total knee arthroplasty (TKA)    Patient Active Problem List   Diagnosis    S/P knee replacement    [facility]ism ([facility]    Arthritis of left knee      Tentative placement recommendation: Home Health PT 5 days a week with 24/7 family assist vs. Subcute  Equipment recommendation: [address]      Prior Level of Function: Patient ambulated independently    Rehab Potential: good for baseline    Past medical history:  Past Medical History:   Diagnosis Date    Parkinson's disease ([facility]      Past Surgical History:   Procedure Laterality Date    DEEP BRAIN STIMULATOR PLACEMENT      L chest    FRACTURE SURGERY      nose    JOINT REPLACEMENT Right 05/16/2017    knee    KNEE ARTHROSCOPY Right     TONSILLECTOMY      TOTAL KNEE ARTHROPLASTY Left 9/1/2020    TOTAL LEFT KNEE REPLACEMENT/ ARTHROPLASTY (JOLENE) performed by Robbi Amaya DO at [facility] OR     Precautions: falls,  FWB (full weight bearing), urinary incontinence, Parkinson's    SUBJECTIVE:    Social history: Patient lives with spouse in a ranch home with 1 step  to enter without Rail, Tub shower     Equipment owned: [employer], [employer] and Tub transfer bench,       [facility]   How much difficulty turning over in bed?: A Little  How much difficulty sitting down on / standing up from a chair with arms?: A Little  How much difficulty moving from lying on back to sitting on side of bed?: A Little  How much help from another person moving to and from a bed to a chair?: A Little  How much help from another person needed to walk in hospital room?: A Little  How much help from another person for climbing 3-5 steps with a railing?: A Lot  AM-PAC Inpatient Mobility Raw Score : 17  AM-PAC Inpatient T-Scale Score : 42.13  Mobility Inpatient CMS 0-100% Score: 50.57  Mobility Inpatient CMS G-Code Modifier : CK    Nursing cleared patient for PT treatment. Patient complained of pain in left knee. Patient was medicated for pain during treatment. Patient's wife was present. OBJECTIVE:   Initial Evaluation  Date: 9/1/20 Treatment Date: 9/2/2020 Short Term/ Long Term   Goals   Was pt agreeable to Eval/treatment? Yes Yes    Pain Level  0/10   spinal intact No number assigned to left knee pain     Bed Mobility  Rolling: Moderate assist of 1    Supine to sit: Moderate assist of 1    Sit to supine: Moderate assist of 1    Scooting: Moderate assist of 1   Rolling: Not assessed    Supine to sit: Minimal assist of 1   Sit to supine: Not assessed    Scooting: Minimal assist of 1  Rolling: Independent    Supine to sit: Independent    Sit to supine: Independent    Scooting: Independent     Transfers Sit to stand: Moderate assist of  2 for proper hand and foot placement, forward wt shift  Sit to stand: Minimal assist of 1; Patient was given instruction for sit to stand transfers regarding weight shifting, sequencing, and proper foot/hand placement to achieve an effective stand with proper knee extension and body mechanics. Sit to stand: Modified Independent     Ambulation    1-2 side steps using  wheeled walker with Moderate assist of  2   for balance and walker control; posterior lean and incontinence Patient ambulated 20 feet x 2 using wheeled walker with Minimal assist of 1. Verbal cues were given for safety, upright posture, increased base of support, proper sequencing, walker management, and obstacle negotiation. Cues were also required to correct gait pattern; including pushing off at toe-off to encourage knee flexion during swing phase of gait.  100 feet using  wheeled walker with Modified Independent    Stair negotiation: ascended and descended   Not assessed    Not assessed  1 step with no rail supervision    ROM Within functional limits except Left knee 10-60° with pronounced rigidity and poor motor control d/t spinal and bradykinesia; pateint is able to flex 2/5 trials  Increase range of motion 10% of affected joints    Strength bilateral upper extremities See OT note   Right lower extremity 3/5  Left lower extremity 2/5  Within functional limits   Balance Sitting EOB:  poor  D/t decreased sensation from spinal  Dynamic Standing:  poor  wheeled walker  Sitting EOB: good   Dynamic Standing: fair using wheeled walker Sitting EOB:  good    Dynamic Standing:  good wheeled walker      Patient is Alert & Oriented x person, place, time and situation and follows directions    Patient education  Patient and family was educated and facilitated on techniques to increase safety and independence with bed mobility, balance, functional transfers, and functional mobility. Patient was explained the the benefits of mobility and risks of immobility. Educated patient on importance and purpose of adaptive device and adjusted to proper height for the patient. Patient was provided and continues to require verbal cues to increase stride length and jessie to normalize gait mechanics as well as to improve stability to prevent fall risk. Patient response to education:   Pt verbalized understanding Pt demonstrated skill Pt requires further education in this area    Yes Partial Yes      Treatment: Patient practiced and was instructed in the following treatment:      Patient performed below exercises. Patient transferred to side of bed and sat up x 10 minutes to improve endurance, dynamic sitting balance and activity tolerance. Patient stood and ambulated to doorway.     Therapeutic Exercises: Patient performed AROM/AAROM ankle pumps, quad sets, glut sets, heel slide, hip abduction/adduction and straight leg raise x 10 reps. Verbal cues were given for correct technique and to perform 2-3x daily. At end of session, patient was in wheelchair and left in care of Deon Katz, call light and phone within reach, all lines were intact, and nursing was notified. ASSESSMENT:  Patient continues to require consistent cueing for proper technique's and walker management. Patient had poor follow through with freezing gait due to Parkinson's where patient's wife stated they were worse than typical today due to \"the stress from everything\". Patient required extensive time and cueing to complete functional mobility. Patient had limited knee extension with difficulty performing without assistance. Encouraged subacute rehab again to patient and family. Patient would benefit from continued skilled Physical Therapy to improve functional independence and quality of life. PLAN:  Patient is making good progress towards established goals. Will continue with exercises and functional mobility practice to improve gait and quad strength to improve motor recruitment in order to achieve greater knee extension. Patient and family education will also continue to be administered as needed.        Time in: 1:28  Time out: 2:09    Total Treatment Time: 41 minutes    CPT codes:     minutes units   Gait training [phone]) 20 1   Therapeutic activities (50617 10 1   Therapeutic exercise (22220) 11 1   Neuro-facilitation techniques (24367) 0 0   No treatment 19 0   TOTAL MINUTES 41  3     Angelic Zamarripa PTA

## 2020-09-03 VITALS
RESPIRATION RATE: 16 BRPM | DIASTOLIC BLOOD PRESSURE: 71 MMHG | OXYGEN SATURATION: 95 % | HEART RATE: 70 BPM | TEMPERATURE: 98.4 F | SYSTOLIC BLOOD PRESSURE: 104 MMHG

## 2020-09-03 LAB
HCT VFR BLD CALC: 31.8 % (ref 37–54)
HEMOGLOBIN: 10.9 G/DL (ref 12.5–16.5)

## 2020-09-03 PROCEDURE — G0378 HOSPITAL OBSERVATION PER HR: HCPCS

## 2020-09-03 PROCEDURE — 6370000000 HC RX 637 (ALT 250 FOR IP): Performed by: ORTHOPAEDIC SURGERY

## 2020-09-03 PROCEDURE — 85018 HEMOGLOBIN: CPT

## 2020-09-03 PROCEDURE — 36415 COLL VENOUS BLD VENIPUNCTURE: CPT

## 2020-09-03 PROCEDURE — 6370000000 HC RX 637 (ALT 250 FOR IP): Performed by: INTERNAL MEDICINE

## 2020-09-03 PROCEDURE — 97110 THERAPEUTIC EXERCISES: CPT

## 2020-09-03 PROCEDURE — 85014 HEMATOCRIT: CPT

## 2020-09-03 PROCEDURE — 97530 THERAPEUTIC ACTIVITIES: CPT

## 2020-09-03 PROCEDURE — 97535 SELF CARE MNGMENT TRAINING: CPT

## 2020-09-03 PROCEDURE — 97116 GAIT TRAINING THERAPY: CPT

## 2020-09-03 RX ADMIN — CARBIDOPA AND LEVODOPA 2 TABLET: 25; 100 TABLET ORAL at 07:52

## 2020-09-03 RX ADMIN — ASPIRIN 325 MG: 325 TABLET, DELAYED RELEASE ORAL at 07:59

## 2020-09-03 RX ADMIN — ACETAMINOPHEN 650 MG: 325 TABLET, FILM COATED ORAL at 16:02

## 2020-09-03 RX ADMIN — CARBIDOPA AND LEVODOPA 2 TABLET: 25; 100 TABLET ORAL at 12:07

## 2020-09-03 RX ADMIN — DOCUSATE SODIUM - SENNOSIDES 1 TABLET: 50; 8.6 TABLET, FILM COATED ORAL at 07:59

## 2020-09-03 RX ADMIN — TAMSULOSIN HYDROCHLORIDE 0.4 MG: 0.4 CAPSULE ORAL at 08:00

## 2020-09-03 RX ADMIN — CARBIDOPA AND LEVODOPA 2 TABLET: 25; 100 TABLET ORAL at 16:02

## 2020-09-03 RX ADMIN — CETIRIZINE HYDROCHLORIDE 10 MG: 10 TABLET, FILM COATED ORAL at 08:00

## 2020-09-03 RX ADMIN — OXYCODONE HYDROCHLORIDE 10 MG: 5 TABLET ORAL at 03:59

## 2020-09-03 RX ADMIN — ACETAMINOPHEN 650 MG: 325 TABLET, FILM COATED ORAL at 07:59

## 2020-09-03 ASSESSMENT — PAIN DESCRIPTION - PAIN TYPE
TYPE: SURGICAL PAIN
TYPE: SURGICAL PAIN

## 2020-09-03 ASSESSMENT — PAIN SCALES - GENERAL
PAINLEVEL_OUTOF10: 4
PAINLEVEL_OUTOF10: 1
PAINLEVEL_OUTOF10: 5
PAINLEVEL_OUTOF10: 8

## 2020-09-03 ASSESSMENT — PAIN DESCRIPTION - ORIENTATION
ORIENTATION: LEFT
ORIENTATION: LEFT

## 2020-09-03 ASSESSMENT — PAIN DESCRIPTION - LOCATION
LOCATION: KNEE
LOCATION: KNEE

## 2020-09-03 ASSESSMENT — PAIN DESCRIPTION - DESCRIPTORS
DESCRIPTORS: ACHING;DISCOMFORT;DULL
DESCRIPTORS: ACHING;DISCOMFORT;DULL

## 2020-09-03 ASSESSMENT — PAIN - FUNCTIONAL ASSESSMENT
PAIN_FUNCTIONAL_ASSESSMENT: PREVENTS OR INTERFERES SOME ACTIVE ACTIVITIES AND ADLS
PAIN_FUNCTIONAL_ASSESSMENT: PREVENTS OR INTERFERES SOME ACTIVE ACTIVITIES AND ADLS

## 2020-09-03 ASSESSMENT — PAIN DESCRIPTION - ONSET
ONSET: ON-GOING
ONSET: ON-GOING

## 2020-09-03 ASSESSMENT — PAIN DESCRIPTION - FREQUENCY
FREQUENCY: INTERMITTENT
FREQUENCY: INTERMITTENT

## 2020-09-03 ASSESSMENT — PAIN DESCRIPTION - PROGRESSION
CLINICAL_PROGRESSION: GRADUALLY WORSENING
CLINICAL_PROGRESSION: GRADUALLY WORSENING

## 2020-09-03 NOTE — CARE COORDINATION
SS NOTE: COVID RULE OUT. [facility] are aware of the [address] orders. Pt plans on dch home today- wife to transport. Carolina Bell. 09/03/2020.10:19AM.

## 2020-09-03 NOTE — PROGRESS NOTES
Physical Therapy    Physical Therapy Treatment Note    Room #:  0320/0320-01  Patient Name: Heraclio Donnelly  YOB: 1950  MRN: [id number]    Referring Provider: Jamar Casas DO   Date of Service: 9/3/2020  Evaluating Physical Therapist:  Mainor Gutierrez PT  Lic.  # [id number]     Diagnosis:   Arthritis of left knee [M17.12]  Arthritis of left knee [M17.12] status post Left Total knee arthroplasty (TKA)    Patient Active Problem List   Diagnosis    S/P knee replacement    Parkinsonism ([facility] [address]    Arthritis of left knee      Tentative placement recommendation: Home Health PT 5 days a week with 24/7 family assist vs. Subcute  Equipment recommendation: Darek Thurston      Prior Level of Function: Patient ambulated independently    Rehab Potential: good for baseline    Past medical history:  Past Medical History:   Diagnosis Date    Parkinson's disease ([facility] [address]      Past Surgical History:   Procedure Laterality Date    DEEP BRAIN STIMULATOR PLACEMENT      L chest    FRACTURE SURGERY      nose    JOINT REPLACEMENT Right 05/16/2017    knee    KNEE ARTHROSCOPY Right     TONSILLECTOMY      TOTAL KNEE ARTHROPLASTY Left 9/1/2020    TOTAL LEFT KNEE REPLACEMENT/ ARTHROPLASTY (JOLENE) performed by Jamar Casas DO at [facility] OR     Precautions: falls,  FWB (full weight bearing), urinary incontinence, Parkinson's    SUBJECTIVE:    Social history: Patient lives with spouse in a ranch home with 1 step  to enter without Rail, Tub shower     Equipment owned: Cane, [employer] [employer] and Tub transfer bench,       [address] [facility]   How much difficulty turning over in bed?: A Little  How much difficulty sitting down on / standing up from a chair with arms?: A Little  How much difficulty moving from lying on back to sitting on side of bed?: A Little  How much help from another person moving to and from a bed to a chair?: A Little  How much help from another person needed to walk in hospital room?: A Little  How much help from another person for climbing 3-5 steps with a railing?: A Lot  AM-PAC Inpatient Mobility Raw Score : 17  AM-PAC Inpatient T-Scale Score : 42.13  Mobility Inpatient CMS 0-100% Score: 50.57  Mobility Inpatient CMS G-Code Modifier : CK    Nursing cleared patient for PT treatment. Patient complained of pain in left knee. Patient was medicated for pain prior to treatment. OBJECTIVE:   Initial Evaluation  Date: 9/1/20 Treatment Date: 9/3/2020 Short Term/ Long Term   Goals   Was pt agreeable to Eval/treatment? Yes Yes    Pain Level  0/10   spinal intact No number assigned to left knee pain     Bed Mobility  Rolling: Moderate assist of 1    Supine to sit: Moderate assist of 1    Sit to supine: Moderate assist of 1    Scooting: Moderate assist of 1   Rolling: Not assessed    Supine to sit: Supervision    Sit to supine: Not assessed    Scooting: Supervision   Rolling: Independent    Supine to sit: Independent    Sit to supine: Independent    Scooting: Independent     Transfers Sit to stand: Moderate assist of  2 for proper hand and foot placement, forward wt shift  Sit to stand: Minimal assist of 1; Patient was given instruction for sit to stand transfers regarding weight shifting, sequencing, and proper foot/hand placement to achieve an effective stand with proper knee extension and body mechanics. Sit to stand: Modified Independent     Ambulation    1-2 side steps using  wheeled walker with Moderate assist of  2   for balance and walker control; posterior lean and incontinence Patient ambulated 50 feet x 2 using wheeled walker with Minimal assist of 1. Verbal cues were given for safety, upright posture, increased base of support, proper sequencing, walker management, and obstacle negotiation. Cues were also required to correct gait pattern; including pushing off at toe-off to encourage knee flexion during swing phase of gait.  100 feet using  wheeled walker with Modified Independent    Stair negotiation: ascended and descended   Not assessed    Not assessed  1 step with no rail supervision    ROM Within functional limits except Left knee 10-60° with pronounced rigidity and poor motor control d/t spinal and bradykinesia; pateint is able to flex 2/5 trials  Increase range of motion 10% of affected joints    Strength bilateral upper extremities See OT note   Right lower extremity 3/5  Left lower extremity 2/5  Within functional limits   Balance Sitting EOB:  poor  D/t decreased sensation from spinal  Dynamic Standing:  poor  wheeled walker  Sitting EOB: good   Dynamic Standing: fair using wheeled walker Sitting EOB:  good    Dynamic Standing:  good wheeled walker      Patient is Alert & Oriented x person, place, time and situation and follows directions    Patient education  Patient was educated and facilitated on techniques to increase safety and independence with bed mobility, balance, functional transfers, and functional mobility. Patient was explained the the benefits of mobility and risks of immobility. Educated patient on importance and purpose of adaptive device and adjusted to proper height for the patient. Patient was provided and continues to require verbal cues to increase stride length and jessie to normalize gait mechanics as well as to improve stability to prevent fall risk. Patient response to education:   Pt verbalized understanding Pt demonstrated skill Pt requires further education in this area    Yes Partial Yes      Treatment: Patient practiced and was instructed in the following treatment:      Patient performed below exercises. Patient transferred to side of bed and sat up x 5 minutes to improve endurance, dynamic sitting balance and activity tolerance. Patient stood and ambulated in hallway. Patient returned to room and sat in chair.     Therapeutic Exercises: Patient performed AROM/AAROM ankle pumps, quad sets, glut sets, heel slide, hip abduction/adduction and straight leg raise x 10 reps. Verbal cues were given for correct technique and to perform 2-3x daily. At end of session, patient was in chair with alarm activated, call light and phone within reach, all lines were intact, and nursing was notified. ASSESSMENT:  Patient continues to require consistent cueing for proper technique's and walker management. However, patient had improved follow through and required decreased assist. Patient had limited knee extension with difficulty performing without assistance. Patient would benefit from continued skilled Physical Therapy to improve functional independence and quality of life. PLAN:  Patient is making good progress towards established goals. Will continue with exercises and functional mobility practice to improve gait and quad strength to improve motor recruitment in order to achieve greater knee extension. Patient and family education will also continue to be administered as needed.      Time in: 9:02  Time out: 9:27    Total Treatment Time: 25 minutes    CPT codes:     minutes units   Gait training [phone] 15 1   Therapeutic activities (43336 10 1   Therapeutic exercise (49113) 10 1   Neuro-facilitation techniques (51599) 0 0   No treatment 0 0   TOTAL MINUTES 25  2     Angelic Zamarripa, NELL

## 2020-09-03 NOTE — DISCHARGE SUMMARY
Internal Medicine Progress Note     [facility]     London Barron, DARAOTEE. Parminder Strickland D.O., VIVI Cervantes D.O. Joseph Velazquez, MSN, APRN, NP-C  Fely Mckeon. Kat Gage, MSN, [id number] [facility]       Internal Medicine  Discharge Summary    NAME: Vera Peng  :  1950  MRN:  [id number]  PCP:No primary care provider on file. ADMITTED: 2020      DISCHARGED: 9/3/20    ADMITTING PHYSICIAN: Karina Foley DO    CONSULTANT(S):   IP CONSULT TO SOCIAL WORK  IP CONSULT TO INTERNAL MEDICINE  IP CONSULT TO HOME CARE NEEDS     ADMITTING DIAGNOSIS:   Arthritis of left knee [M17.12]  Arthritis of left knee [M17.12]     DISCHARGE DIAGNOSES:   1. Osteoarthritis of the left knee status post knee replacement  2. Parkinson's disease on multiple Parkinson's medications with underlying brain stimulator    BRIEF HISTORY OF PRESENT ILLNESS:   Meghan is a 68-year-old gentleman who presented to the hospital today with plans for left knee replacement in the setting of longstanding osteoarthritis. We were asked to provide consultation for medical management and surgical clearance. The patient has an extensive history that includes Parkinson's disease. He underwent stimulator placement in [facility] for this process. He has the ability to shot this mechanism off during the surgical intervention and this is recommended. Otherwise, he is healthy at baseline. He denies cardiovascular disease. He denies any hypertension. He last underwent orthopedic intervention for right knee osteoarthritis and tolerated replacement without complication.   He has no complaints today and is resting comfortably    LABS[de-identified]  Lab Results   Component Value Date    WBC 4.4 (L) 2020    HGB 10.9 (L) 2020    HCT 31.8 (L) 2020     2020     2020    K 4.4 2020     2020    CREATININE 0.6 (L) 2020    BUN 14 2020 CO2 27 08/31/2020    GLUCOSE 95 08/31/2020    ALT <5 05/17/2017    AST 18 05/17/2017    INR 1.1 08/31/2020     Lab Results   Component Value Date    INR 1.1 08/31/2020    INR 1.0 05/04/2017    PROTIME 12.2 08/31/2020    PROTIME 11.2 05/04/2017      No results found for: TSH  No results found for: TRIG  No results found for: HDL  No results found for: LDLCALC  No results found for: LABA1C    IMAGING:  Xr Knee Left (1-2 Views)    Result Date: 9/1/2020  EXAMINATION: TWO XRAY VIEWS OF THE LEFT KNEE 9/1/2020 1:38 pm COMPARISON: August 7, 2020 HISTORY: ORDERING SYSTEM PROVIDED HISTORY: TKR TECHNOLOGIST PROVIDED HISTORY: Of operative side while in recovery room. Reason for exam:->TKR FINDINGS: There are immediate postoperative changes related to left knee arthroplasty. The femoral and tibial components appear to be well seated. There is no convincing evidence of an acute fracture. Periarticular soft tissue swelling and subcutaneous gas are expected in the postoperative state. Vascular calcifications are seen in the left lower extremity. Expected postsurgical changes related to left knee arthroplasty. Xr Knee Left (1-2 Views)    Result Date: 8/7/2020  EXAMINATION: TWO XRAY VIEWS OF THE LEFT KNEE 8/7/2020 10:39 am COMPARISON: None. HISTORY: ORDERING SYSTEM PROVIDED HISTORY: Left knee pain, unspecified chronicity FINDINGS: There are severe degenerative changes of the medial compartment with moderate degenerative changes of the patellofemoral and lateral compartments. There is complete loss of joint space in the medial compartment with subchondral sclerosis and osteophyte formation. Chondrocalcinosis is noted in the lateral compartment. No evidence of a sizable suprapatellar joint effusion. No acute fracture. Vascular calcifications are no left lower extremity. Severe degenerative changes of the medial compartment. No acute osseous abnormality.          HOSPITAL COURSE:   Timoteo Morales did very well throughout the hospitalization. He was resumed on his Parkinson's medications postoperatively and returned to his baseline. He worked with the therapy teams in the setting of his orthopedic intervention. He did develop mild postoperative urinary retention which ultimately resolved. His wife was updated and the patient was ultimately deemed acceptable for discharge home on 9/3/2020. He requires extensive work with the therapy teams. His pain is otherwise well controlled. BRIEF PHYSICAL EXAMINATION AND LABORATORIES ON DAY OF DISCHARGE:  VITALS:  /72   Pulse 83   Temp 98.1 °F (36.7 °C) (Oral)   Resp 16   SpO2 93%     HEENT:  PERRLA. EOMI. Sclera clear. Buccal mucosa moist.    Neck:  Supple. Trachea midline. No thyromegaly. No JVD. No bruits. Heart:  Rhythm regular, rate controlled. No murmurs. Lungs:  Symmetrical. Clear to auscultation bilaterally. No wheezes. No rhonchi. No rales. Abdomen: Soft. Non-tender. Non-distended. Bowel sounds positive. No organomegaly or masses. No pain on palpation    Extremities:  Peripheral pulses present. No peripheral edema. No ulcers. Postoperative dressings are in place. Neurologic:  Alert x 3. Obvious signs of end-stage Parkinson's disease. Skin:  No petechia. No hemorrhage. No wounds. DISPOSITION:  The patient's condition is good. At this time the patient is without objective evidence of an acute process requiring continuing hospitalization or inpatient management. They are stable for discharge with outpatient follow-up. I have spoken with the patient and discussed the results of the current hospitalization, in addition to providing specific details for the plan of care and counseling regarding the diagnosis and prognosis. The plan has been discussed in detail and they are aware of the specific conditions for emergent return, as well as the importance of follow-up.   Their questions are answered at this time and they are agreeable with the plan for discharge to home    DISCHARGE MEDICATIONS:    Daniel Ly   Home Medication Instructions MZE:[id number]    Printed on:09/03/20 8952   Medication Information                      aspirin 325 MG EC tablet  Take 1 tablet by mouth 2 times daily for 28 days             carbidopa-levodopa (SINEMET)  MG per tablet  Take 2 tablets by mouth 4 times daily             HYDROcodone-acetaminophen (NORCO) 5-325 MG per tablet  Take 1 tablet by mouth every 6 hours as needed for Pain for up to 7 days. Intended supply: 7 days. Take lowest dose possible to manage pain             ibuprofen (ADVIL;MOTRIN) 600 MG tablet  Take 600 mg by mouth every 6 hours as needed for Pain             loratadine (CLARITIN) 10 MG tablet  Take 10 mg by mouth daily as needed              sertraline (ZOLOFT) 50 MG tablet  TAKE 1 2 TABLET BY MOUTH ONCE DAILY FOR 7 DAYS THEN INCREASE TO 1 TABLET DAILY THEREAFTER                 FOLLOW UP/INSTRUCTIONS:  · This patient is instructed to follow-up with his primary care physician. · Patient is instructed to follow-up with the consults listed above as directed by them. · he is instructed to resume home medications and take new medications as indicated in the list above. · If the patient has a recurrence of symptoms, he is instructed to go to the ED. Preparing for this patient's discharge, including paperwork, orders, instructions, and meeting with patient did require > 40 minutes.     Federico Sahni DO     9/3/2020  7:33 AM

## 2020-09-03 NOTE — PROGRESS NOTES
OCCUPATIONAL THERAPY  Bedside Treatment Note    [id number]  Patient Name: Brent Ramirez  MRN: [id number]  : 1950  Room: [address] [place] [address]     Referring Provider: DO Dylan Nicole OT: Jewel Mode OTR/L [id number]     Placement Recommendation: QUETA vs. HHOT with / supervision/assist (pt/family refuse QUETA)   Recommended Adaptive Equipment: wheeled walker     [facility]   AM-PAC Daily Activity Inpatient   How much help for putting on and taking off regular lower body clothing?: A Little  How much help for Bathing?: A Little  How much help for Toileting?: A Little  How much help for putting on and taking off regular upper body clothing?: A Little  How much help for taking care of personal grooming?: A Little  How much help for eating meals?: None  AM-PAC Inpatient Daily Activity Raw Score: 19  AM-PAC Inpatient ADL T-Scale Score : 40.22  ADL Inpatient CMS 0-100% Score: 42.8  ADL Inpatient CMS G-Code Modifier : CK     Diagnosis:   1. Preop testing      Pertinent Medical History:   Past Medical History        Past Medical History:   Diagnosis Date    Parkinson's disease ([facility] [address]            Precautions:  falls, full weight bearing: L LE d/t TKA, Parkinson's Disease, poor safety awareness    Pain Scale: Numeric Rate: no pain; Nursing notified. Social history: with family: spouse, Zulema Rodriguez: yes  Home architecture: single family home, 1 story, 1 step to enter with no rail, tub shower. PLOF: independent with BADL and IADL, ambulated with no device   Equipment owned: standard walker, straight cane, bedside commode (christopher), tub transfer bench  Cognition: oriented x 4; follows 3 step directions.                fair  Problem solving skills              fair  Memory               fair  Sequencing  Communication: intact   Visual perceptual skills: intact                Glasses: no   Edema: no    Sensation: intact   Hand Dominance:  Left     X  Right       Left Right Comment   Passive range of motion [facility]      Active range of motion [facility]      Muscle Grade 4/5 4/5     /pinch Strength Intact  Intact      Additional Information: Good  and wfl FMC/dexterity noted during ADL tasks Good  and wfl FMC/dexterity noted during ADL tasks        Functional Assessment:    Initial Eval Status  Date: 9/1/2020 Treatment Status Date: 9/3/2020 STGs = LTGs  Time frame: 5-7 days   Feeding Independent    Independent    Grooming Maximal Assist at sink level    Independent at sink level    UB Dressing Moderate Assist to doff and don hospital gown x 2 reps due to urinary incontinence    Independent    LB Dressing Dependent to doff socks    Independent    Bathing Maximal Assist   Modified Nye    Toileting Maximal Assist for hygiene and to place urinal   Independent    Bed Mobility  Supine to sit: Moderate Assist   Scooting: Moderate Assist  Sit to supine: Moderate Assist  Supervision for supine to sit  Supervision for scooting  Minimal Assist for sit to supine Supine to sit: Independent   Sit to supine: Independent    Functional Transfers Maximal Assist x 2 from EOB x 2 reps Minimal Assist for sit to stand transfer from EOB to wheeled walker Independent    Functional Mobility N/T  Minimal Assist with wheeled walker to and from bedside commode. Household distance. Modified Nye    Activity Tolerance Fair minus   Fair  Good       Balance:              Sitting: Fair at EOB              Standing: Fair with wheeled walker               Endurance: fair     Treatment: Upon entering the room the patients bed alarm was going off. Pt requested to stand and get to the bedside commode. Therapist facilitated the rest of bed mobility. Transfer training from EOB to wheeled walker. Functional mobility from EOB to bedside commode. Pt attempted to move bedside commode by reaching over the wheeled walker, poor safety awareness.  Advised the patient that the wheeled walker would not roll over the bedside commode as it would with a low commode. Pt was handed a urinal with verbal cues for upright posture. 100cc. Pt assisted back to bed. Poor walker sequence. Assisted patient in brining B LE's back to bed. Spouse present. Overall, pt demonstrated decreased independence and safety during completion of ADL/functional transfers/mobility tasks. Pt would benefit from continued skilled OT to increase safety and independence with completion of ADL/IADL tasks for functional independence and quality of life. · Pt has made fair minus progress towards set goals as noted through increased balance during ADL tasks).     · Continue with current plan of care    Treatment Time In: 3:30pm          Treatment Time Out: 3:39pm                Treatment Charges: Mins Units   ADL/Home Mgt                    [phone]    Therapeutic Activities          [id number]     Therapeutic Exercise           86349     Manual Therapy                   02673     Neuro Re-ed                        88898     Orthotic manage/training     03244     Total Timed Treatment 9 Marilyn Waddell OTR/L [id number]

## 2020-09-03 NOTE — PROGRESS NOTES
in hospital room?: A Little  How much help from another person for climbing 3-5 steps with a railing?: A Lot  AM-PAC Inpatient Mobility Raw Score : 17  AM-PAC Inpatient T-Scale Score : 42.13  Mobility Inpatient CMS 0-100% Score: 50.57  Mobility Inpatient CMS G-Code Modifier : CK    Nursing cleared patient for PT treatment. Patient complained of pain in left knee. Patient's wife was present. OBJECTIVE:   Initial Evaluation  Date: 9/1/20 Treatment Date: 9/3/2020 Short Term/ Long Term   Goals   Was pt agreeable to Eval/treatment? Yes Yes    Pain Level  0/10   spinal intact No number assigned to left knee pain     Bed Mobility  Rolling: Moderate assist of 1    Supine to sit: Moderate assist of 1    Sit to supine: Moderate assist of 1    Scooting: Moderate assist of 1   Rolling: Not assessed    Supine to sit: Supervision    Sit to supine: Minimal assist of 1   Scooting: Supervision   Rolling: Independent    Supine to sit: Independent    Sit to supine: Independent    Scooting: Independent     Transfers Sit to stand: Moderate assist of  2 for proper hand and foot placement, forward wt shift  Sit to stand: Minimal assist of 1; Patient was given instruction for sit to stand transfers regarding weight shifting, sequencing, and proper foot/hand placement to achieve an effective stand with proper knee extension and body mechanics. Sit to stand: Modified Independent     Ambulation    1-2 side steps using  wheeled walker with Moderate assist of  2   for balance and walker control; posterior lean and incontinence Patient ambulated 60 feet x 2 using wheeled walker with Minimal assist of 1. Verbal cues were given for safety, upright posture, increased base of support, proper sequencing, walker management, and obstacle negotiation. Cues were also required to correct gait pattern; including pushing off at toe-off to encourage knee flexion during swing phase of gait.  100 feet using  wheeled walker with Modified Independent    Stair raise x 10 reps. Verbal cues were given for correct technique and to perform 2-3x daily. At end of session, patient was in chair with alarm activated, call light and phone within reach, all lines were intact, and nursing was notified. ASSESSMENT:  Patient required increased cueing and assistance for proper technique's, foot placement, and walker management. This was due to patient's increased pain level which increased patient's PD tremors but he declined pain meds by nursing. Encouraged patient to take pain meds prior to therapy in order to progress and perform functional mobility safely and accurately. Reviewed all patient education and precautions with patient and wife again where they had no concerns with patient returning home today. Patient would benefit from continued skilled Physical Therapy to improve functional independence and quality of life. PLAN:  Patient is making good progress towards established goals. Will continue with exercises and functional mobility practice to improve gait and quad strength to improve motor recruitment in order to achieve greater range of motion. Patient and family education will also continue to be administered as needed.      Time in: 1:26  Time out: 1:50    Total Treatment Time: 24 minutes    CPT codes:     minutes units   Gait training [phone] 12 1   Therapeutic activities (52966 0 0   Therapeutic exercise (20087) 12 1   Neuro-facilitation techniques (95568) 0 0   No treatment 0 0   TOTAL MINUTES 24  2     Angelic Zamarripa PTA

## 2020-09-03 NOTE — PLAN OF CARE
Problem: Falls - Risk of:  Goal: Will remain free from falls  Description: Will remain free from falls  9/2/2020 2121 by Paolo Kennedy RN  Outcome: Met This Shift     Problem: Falls - Risk of:  Goal: Absence of physical injury  Description: Absence of physical injury  9/2/2020 2121 by Paolo Kennedy RN  Outcome: Met This Shift     Problem: Pain:  Goal: Pain level will decrease  Description: Pain level will decrease  9/2/2020 2121 by Paolo Kennedy RN  Outcome: Met This Shift     Problem: Pain:  Goal: Control of acute pain  Description: Control of acute pain  9/2/2020 2121 by Paolo Kennedy RN  Outcome: Met This Shift

## 2020-09-03 NOTE — PLAN OF CARE
Problem: Falls - Risk of:  Goal: Will remain free from falls  Description: Will remain free from falls  9/3/2020 1604 by Lucianne Mohs, RN  Outcome: Completed     Problem: Falls - Risk of:  Goal: Absence of physical injury  Description: Absence of physical injury  9/3/2020 1604 by Lucianne Mohs, RN  Outcome: Completed     Problem: Pain:  Goal: Pain level will decrease  Description: Pain level will decrease  9/3/2020 1604 by Lucianne Mohs, RN  Outcome: Completed     Problem: Pain:  Goal: Control of acute pain  Description: Control of acute pain  9/3/2020 1604 by Lucianne Mohs, RN  Outcome: Completed     Problem: Pain:  Goal: Control of chronic pain  Description: Control of chronic pain  9/3/2020 1604 by Lucianne Mohs, RN  Outcome: Completed     Problem: Pain:  Goal: Control of acute pain  Description: Control of acute pain  9/3/2020 1604 by Lucianne Mohs, RN  Outcome: Completed     Problem: Pain:  Goal: Control of chronic pain  Description: Control of chronic pain  9/3/2020 1604 by Lucianne Mohs, RN  Outcome: Completed     Problem: Skin Integrity:  Goal: Will show no infection signs and symptoms  Description: Will show no infection signs and symptoms  9/3/2020 1604 by Lucianne Mohs, RN  Outcome: Completed     Problem: Skin Integrity:  Goal: Absence of new skin breakdown  Description: Absence of new skin breakdown  9/3/2020 1604 by Lucianne Mohs, RN  Outcome: Completed

## 2020-09-03 NOTE — PROGRESS NOTES
Muscle Grade 4/5 4/5     /pinch Strength Intact  Intact      Additional Information: Good  and wfl FMC/dexterity noted during ADL tasks Good  and wfl FMC/dexterity noted during ADL tasks        Functional Assessment:    Initial Eval Status  Date: 9/1/2020 Treatment Status  Date: 9/3/2020 STGs = LTGs  Time frame: 5-7 days   Feeding Independent   N/T Independent    Grooming Maximal Assist at sink level   Min A when standing sinkside in clinic to simulate grooming tasks Independent at sink level    UB Dressing Moderate Assist to doff and don hospital gown x 2 reps due to urinary incontinence   Mod A to adjust gown d/t unsteadiness Independent    LB Dressing Dependent to doff socks   Dep to adjust socks Independent    Bathing Maximal Assist  Pt education/instruction, demonstration and participation with use of DME in clinic for bathroom safety/safe transfers. Pt able to support B LE's to/from simulated tub/shower. Pt required assist for safe transfers d/t unsteadiness and assist for walker safety, as pt moved hands to opposite sides of walker, attempting to turn walker behind him; wife and pt's sister will assist pt at home; family has extended tub bench at home     [facility] for hygiene and to place urinal  Transfer to/from [facility] over toilet in clinic with cuing and assist for hand placement, walker safety Independent    Bed Mobility  Supine to sit: Moderate Assist   Scooting: Moderate Assist  Sit to supine: Moderate Assist   Pt seated in chair on arrival and at end of session Supine to sit:  Independent   Sit to supine: Independent    Functional Transfers Maximal Assist x 2 from EOB x 2 reps Mod A progressing to Min A for sit to stand transfers to/from chair, w/c and multiple surfaces in clinic with min A with FWW; mod A required intermittently d/t  posterior lean; pt required verbal cuing for follow through with increased time noted d/t increased processing time d/t parkinson's disease; simulated car transfer at min A with assist to support LLE to/from car; transfer from w/c to chair at min A with FWW; cuing and assist for hand placement Independent    Functional Mobility N/T  Mod A/min A with FWW; posterior lean noted intermittently with mod A to correct; verbal  cuing for follow through Modified [place]    Activity Tolerance Fair minus   fair/fair  minus; pt limited d/t fatigue/pain, although willing to participate in tx; cuing to keep eyes open throughout session Good         Comments: Patient cleared by nursing staff. Upon arrival pt seated in chair. Wife present. Pt agreeable to OT tx session. Pt required mod A initially d/t unsteadiness and posterior lean, progressing to min/mod A for upright position; pt required verbal cuing for transfers. Min/mod rest breaks provided d/t decreased tolerance. Pt educated with regards to bathroom safety, DME, functional transfers and functional mobility, bed mobility, hand placement, safety awareness, static sitting balance,  standing balance, simulated grooming tasks, toileting, ECT's. At end of session pt seated in chair  With alarm on; all needs within reach. Overall, pt demonstrated decreased independence and safety during completion of ADL/functional transfers/mobility tasks. Pt would benefit from continued skilled OT to increase safety, balance and independence with completion of ADL/IADL tasks for functional independence and quality of life.     · Pt has made fair progress towards set goals as noted through:  · Pt required assist for balance d/t posterior lean and unsteadiness throughout session; less tremoring noted on this date  · pt able to complete bathroom safety/DME transfers in clinic with assist for safety; verbal  cuing for follow through; increased time d/t decreased pace 2* to PD  · Instruction/training on safety and adapted techniques for transfer training, hand placement, walker safety, sequencing · through completion of  transfer training, functional mobility, car, tub/shower and toilet transfers in clinic.   Min/Mod verbal  cuing for safety awareness, joint protection  · Continue with current plan of care    Treatment Time In:9:35 AM    Treatment Time Out:10:07 AM           Treatment Charges: Mins Units   ADL/Home Mgt     [facility]     Thera Activities     [id number]   Ther Ex                 [id number]     Manual Therapy    [phone]     Neuro Re-ed         [id number]     Orthotic manage/training                               [id number]     Non Billable Time     Total Timed Treatment 32 [address] [facility], [address] [place] [address]

## 2020-09-15 NOTE — PROGRESS NOTES
CLINICAL PHARMACY NOTE: MEDS TO [address] Select Patient?: No  Total # of Prescriptions Filled: 2   The following medications were delivered to the patient:  · Hydrocodone 5-325 mg  · Aspirin ec 325 mg  Total # of Interventions Completed: 2  Time Spent (min): 30    Additional Documentation:

## 2020-09-23 ENCOUNTER — OFFICE VISIT (OUTPATIENT)
Dept: ORTHOPEDIC SURGERY | Age: 70
End: 2020-09-23

## 2020-09-23 VITALS — HEIGHT: 69 IN | TEMPERATURE: 98 F | WEIGHT: 140 LBS | BODY MASS INDEX: 20.73 KG/M2

## 2020-09-23 PROCEDURE — 99024 POSTOP FOLLOW-UP VISIT: CPT | Performed by: ORTHOPAEDIC SURGERY

## 2020-09-23 NOTE — PROGRESS NOTES
Chief Complaint:   Chief Complaint   Patient presents with    Knee Pain     Left TKA DOS 9/1/2020       Joe Sykes 3 weeks postop left total knee replacement arthroplasty. He is doing pretty well. He is a little swollen in the left leg now but he did wear his compressive stocking yesterday. He is working on range of motion and activity as well as some stretching. He is also going to therapy. Allergies; medications; past medical, surgical, family, and social history; and problem list have been reviewed today and updated as indicated in this encounter seen below. Exam: The left knee incision is healing up well. His flexion is 100 420 degrees already. His extension is a problem and part of the obstacle is his spasms which occur. When he relaxes, his extension is much better. Coaching helped on that aspect as well. He needs to work on voluntarily relaxing to decrease the spasm and reaction to motion. Radiographs: Postop films were shared with the patient and his wife    ASSESSMENT:    Renata Alba was seen today for knee pain. Diagnoses and all orders for this visit:    S/P TKR (total knee replacement) using cement, left        PLAN: Continue physical therapy and home exercise with concentration on relaxation techniques to allow him to capitalize on what pliability he has and gaining near full extension in the left knee. We will follow-up in 3 weeks    Return in about 3 weeks (around 10/14/2020).        Current Outpatient Medications   Medication Sig Dispense Refill    aspirin 325 MG EC tablet Take 1 tablet by mouth 2 times daily for 28 days 56 tablet 0    carbidopa-levodopa (SINEMET)  MG per tablet Take 2 tablets by mouth 4 times daily      sertraline (ZOLOFT) 50 MG tablet TAKE 1 2 TABLET BY MOUTH ONCE DAILY FOR 7 DAYS THEN INCREASE TO 1 TABLET DAILY THEREAFTER      loratadine (CLARITIN) 10 MG tablet Take 10 mg by mouth daily as needed       ibuprofen (ADVIL;MOTRIN) 600 MG tablet Take 600 mg by mouth every 6 hours as needed for Pain       No current facility-administered medications for this visit.         Patient Active Problem List   Diagnosis    S/P knee replacement    Parkinsonism ([facility] [address]    Arthritis of left knee       Past Medical History:   Diagnosis Date    Parkinson's disease ([facility] [address]        Past Surgical History:   Procedure Laterality Date    DEEP BRAIN STIMULATOR PLACEMENT      L chest    FRACTURE SURGERY      nose    JOINT REPLACEMENT Right 05/16/2017    knee    KNEE ARTHROSCOPY Right     TONSILLECTOMY      TOTAL KNEE ARTHROPLASTY Left 9/1/2020    TOTAL LEFT KNEE REPLACEMENT/ ARTHROPLASTY (JOLENE) performed by Lul Montalvo DO at [facility] OR       Allergies   Allergen Reactions    Seasonal        Social History     Socioeconomic History    Marital status:      Spouse name: None    Number of children: None    Years of education: None    Highest education level: None   Occupational History    None   Social Needs    Financial resource strain: None    Food insecurity     Worry: None     Inability: None    Transportation needs     Medical: None     Non-medical: None   Tobacco Use    Smoking status: Never Smoker    Smokeless tobacco: Former User   Substance and Sexual Activity    Alcohol use: No    Drug use: No    Sexual activity: None   Lifestyle    Physical activity     Days per week: None     Minutes per session: None    Stress: None   Relationships    Social connections     Talks on phone: None     Gets together: None     Attends Islam service: None     Active member of club or organization: None     Attends meetings of clubs or organizations: None     Relationship status: None    Intimate partner violence     Fear of current or ex partner: None     Emotionally abused: None     Physically abused: None     Forced sexual activity: None   Other Topics Concern    None   Social History Narrative    None       Review of Systems  As follows except as previously noted in HPI:  Constitutional: Negative for chills, diaphoresis, fatigue, fever and unexpected weight change. Respiratory: Negative for cough, shortness of breath and wheezing. Cardiovascular: Negative for chest pain and palpitations. Neurological: Negative for dizziness, syncope, cephalgia. GI / : negative  Musculoskeletal: see HPI       Objective:   Physical Exam   Constitutional: Oriented to person, place, and time. and appears well-developed and well-nourished. :   Head: Normocephalic and atraumatic. Eyes: EOM are normal.   Neck: Neck supple. Cardiovascular: Normal rate and regular rhythm. Pulmonary/Chest: Effort normal. No stridor. No respiratory distress, no wheezes. Abdominal:  No abnormal distension. Neurological: Alert and oriented to person, place, and time. Skin: Skin is warm and dry. Psychiatric: Normal mood and affect.  Behavior is normal. Thought content normal.    PAULETTE Acosta,     9/23/20  3:55 PM

## 2020-10-14 ENCOUNTER — OFFICE VISIT (OUTPATIENT)
Dept: ORTHOPEDIC SURGERY | Age: 70
End: 2020-10-14

## 2020-10-14 VITALS — HEIGHT: 69 IN | BODY MASS INDEX: 20.73 KG/M2 | WEIGHT: 140 LBS | TEMPERATURE: 98 F

## 2020-10-14 PROCEDURE — 99024 POSTOP FOLLOW-UP VISIT: CPT | Performed by: ORTHOPAEDIC SURGERY

## 2020-10-14 NOTE — PROGRESS NOTES
Chief Complaint:   Chief Complaint   Patient presents with    Knee Pain     Right TKA DOS 9/1/2020       Akil Medel is 6 weeks postop left total knee replacement arthroplasty. He complains of little bit of pain at times. He did not go to therapy after his home therapy was done. He had done that with the other knee which resulted very well. His wife also says that he is little stiff and sore      Allergies; medications; past medical, surgical, family, and social history; and problem list have been reviewed today and updated as indicated in this encounter seen below. Exam: The incision is healing well. There is 1 little erythematous area proximal.  There is no fluctuance and no signs of complication. Flexion is very good in the knee greater than 105 degrees. Extension is limited with quite tight hamstrings well. Is possible to get him to a little less than 10 degrees. Radiographs: None    ASSESSMENT:    Analisa Alexander was seen today for knee pain. Diagnoses and all orders for this visit:    S/P TKR (total knee replacement) using cement, left  -     External Referral To Physical Therapy        PLAN: We will schedule him for outpatient physical therapy which should help with mobility and function and also decrease his discomfort over time. We will follow-up in 4 weeks. Return in about 4 weeks (around 11/11/2020).        Current Outpatient Medications   Medication Sig Dispense Refill    carbidopa-levodopa (SINEMET)  MG per tablet Take 2 tablets by mouth 4 times daily      sertraline (ZOLOFT) 50 MG tablet TAKE 1 2 TABLET BY MOUTH ONCE DAILY FOR 7 DAYS THEN INCREASE TO 1 TABLET DAILY THEREAFTER      loratadine (CLARITIN) 10 MG tablet Take 10 mg by mouth daily as needed       ibuprofen (ADVIL;MOTRIN) 600 MG tablet Take 600 mg by mouth every 6 hours as needed for Pain      aspirin 325 MG EC tablet Take 1 tablet by mouth 2 times daily for 28 days 56 tablet 0     No current facility-administered medications for this visit.         Patient Active Problem List   Diagnosis    S/P knee replacement    Parkinsonism ([facility] [address]    Arthritis of left knee       Past Medical History:   Diagnosis Date    Parkinson's disease ([facility] [address]        Past Surgical History:   Procedure Laterality Date    DEEP BRAIN STIMULATOR PLACEMENT      L chest    FRACTURE SURGERY      nose    JOINT REPLACEMENT Right 05/16/2017    knee    KNEE ARTHROSCOPY Right     TONSILLECTOMY      TOTAL KNEE ARTHROPLASTY Left 9/1/2020    TOTAL LEFT KNEE REPLACEMENT/ ARTHROPLASTY (JOLENE) performed by James Pennington DO at [facility] OR       Allergies   Allergen Reactions    Seasonal        Social History     Socioeconomic History    Marital status:      Spouse name: None    Number of children: None    Years of education: None    Highest education level: None   Occupational History    None   Social Needs    Financial resource strain: None    Food insecurity     Worry: None     Inability: None    Transportation needs     Medical: None     Non-medical: None   Tobacco Use    Smoking status: Never Smoker    Smokeless tobacco: Former User   Substance and Sexual Activity    Alcohol use: No    Drug use: No    Sexual activity: None   Lifestyle    Physical activity     Days per week: None     Minutes per session: None    Stress: None   Relationships    Social connections     Talks on phone: None     Gets together: None     Attends Jehovah's witness service: None     Active member of club or organization: None     Attends meetings of clubs or organizations: None     Relationship status: None    Intimate partner violence     Fear of current or ex partner: None     Emotionally abused: None     Physically abused: None     Forced sexual activity: None   Other Topics Concern    None   Social History Narrative    None       Review of Systems  As follows except as previously noted in HPI:  Constitutional: Negative for chills, diaphoresis, fatigue, fever and unexpected weight change. Respiratory: Negative for cough, shortness of breath and wheezing. Cardiovascular: Negative for chest pain and palpitations. Neurological: Negative for dizziness, syncope, cephalgia. GI / : negative  Musculoskeletal: see HPI       Objective:   Physical Exam   Constitutional: Oriented to person, place, and time. and appears well-developed and well-nourished. :   Head: Normocephalic and atraumatic. Eyes: EOM are normal.   Neck: Neck supple. Cardiovascular: Normal rate and regular rhythm. Pulmonary/Chest: Effort normal. No stridor. No respiratory distress, no wheezes. Abdominal:  No abnormal distension. Neurological: Alert and oriented to person, place, and time. Skin: Skin is warm and dry. Psychiatric: Normal mood and affect.  Behavior is normal. Thought content normal.    K Jonas Dakins, DO    10/14/20  9:24 AM

## 2020-11-11 ENCOUNTER — OFFICE VISIT (OUTPATIENT)
Dept: ORTHOPEDIC SURGERY | Age: 70
End: 2020-11-11

## 2020-11-11 VITALS — HEIGHT: 69 IN | BODY MASS INDEX: 20.73 KG/M2 | TEMPERATURE: 98 F | WEIGHT: 140 LBS

## 2020-11-11 PROCEDURE — 99024 POSTOP FOLLOW-UP VISIT: CPT | Performed by: NURSE PRACTITIONER

## 2020-11-11 NOTE — PROGRESS NOTES
Subjective:     Post-Operative week: 2 Status Post left Total Knee Arthroplasty, surgery date 9/1/2020. Systemic or Specific Complaints:intermittent pain    Objective:     General: alert, appears stated age and cooperative   Wound: Wound clean and dry no evidence of infection. , No Erythema, No Edema and No Drainage   Motion: Flexion: -5 to 110 Degrees   DVT Exam: No evidence of DVT seen on physical exam.  Negative Gayla's sign. No cords or calf tenderness. No significant calf/ankle edema. Imaging:  Xrays: none today  Assessment:     Encounter Diagnosis   Name Primary?  S/P TKR (total knee replacement) using cement, left Yes      Doing well postoperatively. Plan:     Continues current post-op course  Continue physical therapy  We will see the patient back in 6 weeks for repeat xray and evaluation.   Please call office with any questions or concerns at [phone]

## 2020-12-28 ENCOUNTER — OFFICE VISIT (OUTPATIENT)
Dept: ORTHOPEDIC SURGERY | Age: 70
End: 2020-12-28
Payer: COMMERCIAL

## 2020-12-28 VITALS — TEMPERATURE: 98 F | WEIGHT: 140 LBS | HEIGHT: 69 IN | BODY MASS INDEX: 20.73 KG/M2

## 2020-12-28 PROCEDURE — 99213 OFFICE O/P EST LOW 20 MIN: CPT | Performed by: ORTHOPAEDIC SURGERY

## 2020-12-28 NOTE — PROGRESS NOTES
Chief Complaint:   Chief Complaint   Patient presents with    Knee Pain     right knee TKA F/U. He was running and it started hurting again. Daniel Otto is nearly 4 months postop left total knee replacement arthroplasty. He is not been having any troubles. He did a little bit of running around the house recently with no troubles with the left knee. Range of motion is good and the stability is good. He has no complaints of pain      Allergies; medications; past medical, surgical, family, and social history; and problem list have been reviewed today and updated as indicated in this encounter seen below. Exam: The incision is well-healed. His knee is stable. He has difficulty relaxing because of his neuromuscular problems. The knee is stable. Patellofemoral alignment is good. Range of motion is about 5 to 120 degrees of flexion. His gait is little bit slow and he has his knees bent a little bit but this is because of his other medical problems. Radiographs: Imaging of the left knee and 2 views shows a stable cemented left total knee replacement arthroplasty in good alignment. ASSESSMENT:    Janus Severe was seen today for knee pain. Diagnoses and all orders for this visit:    S/P TKR (total knee replacement) using cement, left        PLAN: Continue with activities as tolerated. We will follow-up in 3 months. Return in about 3 months (around 3/28/2021).        Current Outpatient Medications   Medication Sig Dispense Refill    carbidopa-levodopa (SINEMET)  MG per tablet Take 2 tablets by mouth 4 times daily      sertraline (ZOLOFT) 50 MG tablet TAKE 1 2 TABLET BY MOUTH ONCE DAILY FOR 7 DAYS THEN INCREASE TO 1 TABLET DAILY THEREAFTER      loratadine (CLARITIN) 10 MG tablet Take 10 mg by mouth daily as needed       ibuprofen (ADVIL;MOTRIN) 600 MG tablet Take 600 mg by mouth every 6 hours as needed for Pain      aspirin 325 MG EC tablet Take 1 tablet by mouth 2 times daily for 28 days 56 tablet 0     No current facility-administered medications for this visit.         Patient Active Problem List   Diagnosis    S/P knee replacement    [facility]ism ([facility] [address]    Arthritis of left knee       Past Medical History:   Diagnosis Date    Parkinson's disease ([facility] [address]        Past Surgical History:   Procedure Laterality Date    DEEP BRAIN STIMULATOR PLACEMENT      L chest    FRACTURE SURGERY      nose    JOINT REPLACEMENT Right 05/16/2017    knee    KNEE ARTHROSCOPY Right     TONSILLECTOMY      TOTAL KNEE ARTHROPLASTY Left 9/1/2020    TOTAL LEFT KNEE REPLACEMENT/ ARTHROPLASTY (JOLENE) performed by Domitila Tripp DO at [facility] OR       Allergies   Allergen Reactions    Seasonal        Social History     Socioeconomic History    Marital status:      Spouse name: None    Number of children: None    Years of education: None    Highest education level: None   Occupational History    None   Social Needs    Financial resource strain: None    Food insecurity     Worry: None     Inability: None    Transportation needs     Medical: None     Non-medical: None   Tobacco Use    Smoking status: Never Smoker    Smokeless tobacco: Former User   Substance and Sexual Activity    Alcohol use: No    Drug use: No    Sexual activity: None   Lifestyle    Physical activity     Days per week: None     Minutes per session: None    Stress: None   Relationships    Social connections     Talks on phone: None     Gets together: None     Attends Catholic service: None     Active member of club or organization: None     Attends meetings of clubs or organizations: None     Relationship status: None    Intimate partner violence     Fear of current or ex partner: None     Emotionally abused: None     Physically abused: None     Forced sexual activity: None   Other Topics Concern    None   Social History Narrative    None       Review of Systems  As follows except as previously noted in HPI:  Constitutional: Negative for chills, diaphoresis, fatigue, fever and unexpected weight change. Respiratory: Negative for cough, shortness of breath and wheezing. Cardiovascular: Negative for chest pain and palpitations. Neurological: Negative for dizziness, syncope, cephalgia. GI / : negative  Musculoskeletal: see HPI       Objective:   Physical Exam   Constitutional: Oriented to person, place, and time. and appears well-developed and well-nourished. :   Head: Normocephalic and atraumatic. Eyes: EOM are normal.   Neck: Neck supple. Cardiovascular: Normal rate and regular rhythm. Pulmonary/Chest: Effort normal. No stridor. No respiratory distress, no wheezes. Abdominal:  No abnormal distension. Neurological: Alert and oriented to person, place, and time. Skin: Skin is warm and dry. Psychiatric: Normal mood and affect.  Behavior is normal. Thought content normal.    PAULETTE Sorai,     12/28/20  9:57 AM

## 2021-03-29 ENCOUNTER — OFFICE VISIT (OUTPATIENT)
Dept: ORTHOPEDIC SURGERY | Age: 71
End: 2021-03-29
Payer: COMMERCIAL

## 2021-03-29 VITALS — HEIGHT: 68 IN | BODY MASS INDEX: 21.22 KG/M2 | WEIGHT: 140 LBS | TEMPERATURE: 98 F

## 2021-03-29 DIAGNOSIS — Z96.652 S/P TKR (TOTAL KNEE REPLACEMENT) USING CEMENT, LEFT: Primary | ICD-10-CM

## 2021-03-29 PROCEDURE — 99213 OFFICE O/P EST LOW 20 MIN: CPT | Performed by: ORTHOPAEDIC SURGERY

## 2021-03-29 NOTE — PROGRESS NOTES
 Parkinsonism ([facility] [address] [facility]    Arthritis of left knee       Past Medical History:   Diagnosis Date    Parkinson's disease ([facility] [address] [facility]        Past Surgical History:   Procedure Laterality Date    DEEP BRAIN STIMULATOR PLACEMENT      L chest    FRACTURE SURGERY      nose    JOINT REPLACEMENT Right 05/16/2017    knee    KNEE ARTHROSCOPY Right     TONSILLECTOMY      TOTAL KNEE ARTHROPLASTY Left 9/1/2020    TOTAL LEFT KNEE REPLACEMENT/ ARTHROPLASTY (JOLENE) performed by Shemar Faith DO at [address] [place] [address]   Allergen Reactions    Seasonal        Social History     Socioeconomic History    Marital status:      Spouse name: None    Number of children: None    Years of education: None    Highest education level: None   Occupational History    None   Social Needs    Financial resource strain: None    Food insecurity     Worry: None     Inability: None    Transportation needs     Medical: None     Non-medical: None   Tobacco Use    Smoking status: Never Smoker    Smokeless tobacco: Former User   Substance and Sexual Activity    Alcohol use: No    Drug use: No    Sexual activity: None   Lifestyle    Physical activity     Days per week: None     Minutes per session: None    Stress: None   Relationships    Social connections     Talks on phone: None     Gets together: None     Attends Zoroastrian service: None     Active member of club or organization: None     Attends meetings of clubs or organizations: None     Relationship status: None    Intimate partner violence     Fear of current or ex partner: None     Emotionally abused: None     Physically abused: None     Forced sexual activity: None   Other Topics Concern    None   Social History Narrative    None       Review of Systems  As follows except as previously noted in HPI:  Constitutional: Negative for chills, diaphoresis, fatigue, fever and unexpected weight change. Respiratory: Negative for cough, shortness of breath and wheezing. Cardiovascular: Negative for chest pain and palpitations. Neurological: Negative for dizziness, syncope, cephalgia. GI / : negative  Musculoskeletal: see HPI       Objective:   Physical Exam   Constitutional: Oriented to person, place, and time. and appears well-developed and well-nourished. :   Head: Normocephalic and atraumatic. Eyes: EOM are normal.   Neck: Neck supple. Cardiovascular: Normal rate and regular rhythm. Pulmonary/Chest: Effort normal. No stridor. No respiratory distress, no wheezes. Abdominal:  No abnormal distension. Neurological: Alert and oriented to person, place, and time. Skin: Skin is warm and dry. Psychiatric: Normal mood and affect.  Behavior is normal. Thought content normal.    PAULETTE Neal Gave, DO    3/29/21  9:32 AM

## 2021-07-26 DIAGNOSIS — Z96.652 S/P TKR (TOTAL KNEE REPLACEMENT) USING CEMENT, LEFT: Primary | ICD-10-CM

## 2023-02-27 ENCOUNTER — APPOINTMENT (OUTPATIENT)
Dept: GENERAL RADIOLOGY | Age: 73
DRG: 522 | End: 2023-02-27
Payer: MEDICARE

## 2023-02-27 ENCOUNTER — APPOINTMENT (OUTPATIENT)
Dept: CT IMAGING | Age: 73
DRG: 522 | End: 2023-02-27
Payer: MEDICARE

## 2023-02-27 ENCOUNTER — HOSPITAL ENCOUNTER (INPATIENT)
Age: 73
LOS: 3 days | Discharge: NURSING, REHAB OR LONG-TERM CARE | DRG: 522 | End: 2023-03-02
Attending: EMERGENCY MEDICINE | Admitting: INTERNAL MEDICINE
Payer: MEDICARE

## 2023-02-27 DIAGNOSIS — Z87.81 S/P LEFT HIP FRACTURE: ICD-10-CM

## 2023-02-27 DIAGNOSIS — S72.002A LEFT DISPLACED FEMORAL NECK FRACTURE (HCC): ICD-10-CM
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
## 2023-02-27 PROBLEM — S72.142A INTERTROCHANTERIC FRACTURE OF LEFT FEMUR, CLOSED, INITIAL ENCOUNTER (HCC): Status: ACTIVE | Noted: 2023-02-27

## 2023-02-27 LAB
ABO/RH: NORMAL
ALBUMIN SERPL-MCNC: 4 G/DL (ref 3.5–5.2)
ALBUMIN SERPL-MCNC: 4.4 G/DL (ref 3.5–5.2)
ALP BLD-CCNC: 57 U/L (ref 40–129)
ALP BLD-CCNC: 63 U/L (ref 40–129)
ALT SERPL-CCNC: <5 U/L (ref 0–40)
ALT SERPL-CCNC: <5 U/L (ref 0–40)
ANION GAP SERPL CALCULATED.3IONS-SCNC: 12 MMOL/L (ref 7–16)
ANION GAP SERPL CALCULATED.3IONS-SCNC: 13 MMOL/L (ref 7–16)
ANTIBODY SCREEN: NORMAL
APTT: 33.3 SEC (ref 24.5–35.1)
AST SERPL-CCNC: 27 U/L (ref 0–39)
AST SERPL-CCNC: 31 U/L (ref 0–39)
BACTERIA: ABNORMAL /HPF
BASOPHILS ABSOLUTE: 0 E9/L (ref 0–0.2)
BASOPHILS RELATIVE PERCENT: 0.3 % (ref 0–2)
BILIRUB SERPL-MCNC: 1.1 MG/DL (ref 0–1.2)
BILIRUB SERPL-MCNC: 1.2 MG/DL (ref 0–1.2)
BILIRUBIN URINE: NEGATIVE
BLOOD, URINE: NEGATIVE
BUN BLDV-MCNC: 13 MG/DL (ref 6–23)
BUN BLDV-MCNC: 15 MG/DL (ref 6–23)
CALCIUM SERPL-MCNC: 8.9 MG/DL (ref 8.6–10.2)
CALCIUM SERPL-MCNC: 9.3 MG/DL (ref 8.6–10.2)
CHLORIDE BLD-SCNC: 100 MMOL/L (ref 98–107)
CHLORIDE BLD-SCNC: 104 MMOL/L (ref 98–107)
CLARITY: CLEAR
CO2: 25 MMOL/L (ref 22–29)
CO2: 25 MMOL/L (ref 22–29)
COLOR: YELLOW
CREAT SERPL-MCNC: 0.6 MG/DL (ref 0.7–1.2)
CREAT SERPL-MCNC: 0.8 MG/DL (ref 0.7–1.2)
EKG ATRIAL RATE: 87 BPM
EKG P AXIS: 102 DEGREES
EKG P-R INTERVAL: 98 MS
EKG Q-T INTERVAL: 378 MS
EKG QRS DURATION: 72 MS
EKG QTC CALCULATION (BAZETT): 454 MS
EKG R AXIS: -5 DEGREES
EKG T AXIS: 17 DEGREES
EKG VENTRICULAR RATE: 87 BPM
EOSINOPHILS ABSOLUTE: 0 E9/L (ref 0.05–0.5)
EOSINOPHILS RELATIVE PERCENT: 0.1 % (ref 0–6)
EPITHELIAL CELLS, UA: ABNORMAL /HPF
GFR SERPL CREATININE-BSD FRML MDRD: >60 ML/MIN/1.73
GFR SERPL CREATININE-BSD FRML MDRD: >60 ML/MIN/1.73
GLUCOSE BLD-MCNC: 105 MG/DL (ref 74–99)
GLUCOSE BLD-MCNC: 125 MG/DL (ref 74–99)
GLUCOSE URINE: NEGATIVE MG/DL
HCT VFR BLD CALC: 38.2 % (ref 37–54)
HCT VFR BLD CALC: 40.4 % (ref 37–54)
HEMOGLOBIN: 12.8 G/DL (ref 12.5–16.5)
HEMOGLOBIN: 13.5 G/DL (ref 12.5–16.5)
INR BLD: 1.3
KETONES, URINE: 40 MG/DL
LEUKOCYTE ESTERASE, URINE: NEGATIVE
LYMPHOCYTES ABSOLUTE: 0.74 E9/L (ref 1.5–4)
LYMPHOCYTES RELATIVE PERCENT: 7 % (ref 20–42)
MCH RBC QN AUTO: 29.4 PG (ref 26–35)
MCH RBC QN AUTO: 29.8 PG (ref 26–35)
MCHC RBC AUTO-ENTMCNC: 33.4 % (ref 32–34.5)
MCHC RBC AUTO-ENTMCNC: 33.5 % (ref 32–34.5)
MCV RBC AUTO: 88 FL (ref 80–99.9)
MCV RBC AUTO: 88.8 FL (ref 80–99.9)
MONOCYTES ABSOLUTE: 0.21 E9/L (ref 0.1–0.95)
MONOCYTES RELATIVE PERCENT: 1.8 % (ref 2–12)
NEUTROPHILS ABSOLUTE: 9.56 E9/L (ref 1.8–7.3)
NEUTROPHILS RELATIVE PERCENT: 91.2 % (ref 43–80)
NITRITE, URINE: NEGATIVE
OVALOCYTES: ABNORMAL
PDW BLD-RTO: 11.9 FL (ref 11.5–15)
PDW BLD-RTO: 12.1 FL (ref 11.5–15)
PH UA: 5.5 (ref 5–9)
PLATELET # BLD: 279 E9/L (ref 130–450)
PLATELET # BLD: 296 E9/L (ref 130–450)
PMV BLD AUTO: 10.1 FL (ref 7–12)
PMV BLD AUTO: 10.2 FL (ref 7–12)
POIKILOCYTES: ABNORMAL
POTASSIUM REFLEX MAGNESIUM: 4 MMOL/L (ref 3.5–5)
POTASSIUM SERPL-SCNC: 4.2 MMOL/L (ref 3.5–5)
PROTEIN UA: NEGATIVE MG/DL
PROTHROMBIN TIME: 15.3 SEC (ref 9.3–12.4)
RBC # BLD: 4.3 E12/L (ref 3.8–5.8)
RBC # BLD: 4.59 E12/L (ref 3.8–5.8)
RBC UA: ABNORMAL /HPF (ref 0–2)
SODIUM BLD-SCNC: 138 MMOL/L (ref 132–146)
SODIUM BLD-SCNC: 141 MMOL/L (ref 132–146)
SPECIFIC GRAVITY UA: 1.01 (ref 1–1.03)
TOTAL PROTEIN: 6.7 G/DL (ref 6.4–8.3)
TOTAL PROTEIN: 7.1 G/DL (ref 6.4–8.3)
TROPONIN, HIGH SENSITIVITY: 16 NG/L (ref 0–11)
TROPONIN, HIGH SENSITIVITY: 16 NG/L (ref 0–11)
UROBILINOGEN, URINE: 0.2 E.U./DL
WBC # BLD: 10.5 E9/L (ref 4.5–11.5)
WBC # BLD: 11.8 E9/L (ref 4.5–11.5)
WBC UA: ABNORMAL /HPF (ref 0–5)

## 2023-02-27 PROCEDURE — 73560 X-RAY EXAM OF KNEE 1 OR 2: CPT

## 2023-02-27 PROCEDURE — 99222 1ST HOSP IP/OBS MODERATE 55: CPT | Performed by: INTERNAL MEDICINE

## 2023-02-27 PROCEDURE — 70450 CT HEAD/BRAIN W/O DYE: CPT

## 2023-02-27 PROCEDURE — 85610 PROTHROMBIN TIME: CPT

## 2023-02-27 PROCEDURE — 81001 URINALYSIS AUTO W/SCOPE: CPT

## 2023-02-27 PROCEDURE — 2580000003 HC RX 258

## 2023-02-27 PROCEDURE — 6360000002 HC RX W HCPCS: Performed by: STUDENT IN AN ORGANIZED HEALTH CARE EDUCATION/TRAINING PROGRAM

## 2023-02-27 PROCEDURE — 73502 X-RAY EXAM HIP UNI 2-3 VIEWS: CPT

## 2023-02-27 PROCEDURE — 85025 COMPLETE CBC W/AUTO DIFF WBC: CPT

## 2023-02-27 PROCEDURE — 85027 COMPLETE CBC AUTOMATED: CPT

## 2023-02-27 PROCEDURE — 84484 ASSAY OF TROPONIN QUANT: CPT

## 2023-02-27 PROCEDURE — 93005 ELECTROCARDIOGRAM TRACING: CPT | Performed by: STUDENT IN AN ORGANIZED HEALTH CARE EDUCATION/TRAINING PROGRAM

## 2023-02-27 PROCEDURE — 99285 EMERGENCY DEPT VISIT HI MDM: CPT

## 2023-02-27 PROCEDURE — 86900 BLOOD TYPING SEROLOGIC ABO: CPT

## 2023-02-27 PROCEDURE — 86901 BLOOD TYPING SEROLOGIC RH(D): CPT

## 2023-02-27 PROCEDURE — 72125 CT NECK SPINE W/O DYE: CPT

## 2023-02-27 PROCEDURE — 71045 X-RAY EXAM CHEST 1 VIEW: CPT

## 2023-02-27 PROCEDURE — 96374 THER/PROPH/DIAG INJ IV PUSH: CPT

## 2023-02-27 PROCEDURE — APPSS45 APP SPLIT SHARED TIME 31-45 MINUTES: Performed by: NURSE PRACTITIONER

## 2023-02-27 PROCEDURE — 1200000000 HC SEMI PRIVATE

## 2023-02-27 PROCEDURE — 80053 COMPREHEN METABOLIC PANEL: CPT

## 2023-02-27 PROCEDURE — 86850 RBC ANTIBODY SCREEN: CPT

## 2023-02-27 PROCEDURE — 85730 THROMBOPLASTIN TIME PARTIAL: CPT

## 2023-02-27 PROCEDURE — 36415 COLL VENOUS BLD VENIPUNCTURE: CPT

## 2023-02-27 PROCEDURE — 93010 ELECTROCARDIOGRAM REPORT: CPT | Performed by: INTERNAL MEDICINE

## 2023-02-27 PROCEDURE — 2500000003 HC RX 250 WO HCPCS

## 2023-02-27 RX ORDER — ONDANSETRON 2 MG/ML
4 INJECTION INTRAMUSCULAR; INTRAVENOUS EVERY 6 HOURS PRN
Status: DISCONTINUED | OUTPATIENT
Start: 2023-02-27 | End: 2023-03-03 | Stop reason: HOSPADM

## 2023-02-27 RX ORDER — SODIUM CHLORIDE 9 MG/ML
INJECTION, SOLUTION INTRAVENOUS PRN
Status: DISCONTINUED | OUTPATIENT
Start: 2023-02-27 | End: 2023-03-03 | Stop reason: HOSPADM

## 2023-02-27 RX ORDER — ACETAMINOPHEN 500 MG
1000 TABLET ORAL EVERY 6 HOURS PRN
COMMUNITY

## 2023-02-27 RX ORDER — SODIUM CHLORIDE 0.9 % (FLUSH) 0.9 %
5-40 SYRINGE (ML) INJECTION PRN
Status: DISCONTINUED | OUTPATIENT
Start: 2023-02-27 | End: 2023-03-03 | Stop reason: HOSPADM

## 2023-02-27 RX ORDER — ONDANSETRON 4 MG/1
4 TABLET, ORALLY DISINTEGRATING ORAL EVERY 8 HOURS PRN
Status: DISCONTINUED | OUTPATIENT
Start: 2023-02-27 | End: 2023-03-03 | Stop reason: HOSPADM

## 2023-02-27 RX ORDER — ACETAMINOPHEN 650 MG/1
650 SUPPOSITORY RECTAL EVERY 6 HOURS PRN
Status: DISCONTINUED | OUTPATIENT
Start: 2023-02-27 | End: 2023-03-03 | Stop reason: HOSPADM

## 2023-02-27 RX ORDER — ACETAMINOPHEN 325 MG/1
650 TABLET ORAL EVERY 6 HOURS PRN
Status: DISCONTINUED | OUTPATIENT
Start: 2023-02-27 | End: 2023-03-03 | Stop reason: HOSPADM

## 2023-02-27 RX ORDER — MORPHINE SULFATE 2 MG/ML
2 INJECTION, SOLUTION INTRAMUSCULAR; INTRAVENOUS ONCE
Status: COMPLETED | OUTPATIENT
Start: 2023-02-27 | End: 2023-02-27

## 2023-02-27 RX ORDER — ENOXAPARIN SODIUM 100 MG/ML
40 INJECTION SUBCUTANEOUS DAILY
Status: DISCONTINUED | OUTPATIENT
Start: 2023-02-27 | End: 2023-03-03 | Stop reason: HOSPADM

## 2023-02-27 RX ORDER — SODIUM CHLORIDE 0.9 % (FLUSH) 0.9 %
5-40 SYRINGE (ML) INJECTION EVERY 12 HOURS SCHEDULED
Status: DISCONTINUED | OUTPATIENT
Start: 2023-02-27 | End: 2023-03-03 | Stop reason: HOSPADM

## 2023-02-27 RX ORDER — POLYETHYLENE GLYCOL 3350 17 G/17G
17 POWDER, FOR SOLUTION ORAL DAILY PRN
Status: DISCONTINUED | OUTPATIENT
Start: 2023-02-27 | End: 2023-03-03 | Stop reason: HOSPADM

## 2023-02-27 RX ORDER — FENTANYL CITRATE 0.05 MG/ML
50 INJECTION, SOLUTION INTRAMUSCULAR; INTRAVENOUS ONCE
Status: COMPLETED | OUTPATIENT
Start: 2023-02-27 | End: 2023-02-27

## 2023-02-27 RX ADMIN — TRANEXAMIC ACID 1000 MG: 1 INJECTION, SOLUTION INTRAVENOUS at 18:51

## 2023-02-27 RX ADMIN — MORPHINE SULFATE 2 MG: 2 INJECTION, SOLUTION INTRAMUSCULAR; INTRAVENOUS at 15:41

## 2023-02-27 RX ADMIN — FENTANYL CITRATE 50 MCG: 0.05 INJECTION, SOLUTION INTRAMUSCULAR; INTRAVENOUS at 18:48

## 2023-02-27 ASSESSMENT — PAIN SCALES - GENERAL
PAINLEVEL_OUTOF10: 9
PAINLEVEL_OUTOF10: 8
PAINLEVEL_OUTOF10: 8
PAINLEVEL_OUTOF10: 0

## 2023-02-27 ASSESSMENT — PAIN DESCRIPTION - PAIN TYPE: TYPE: ACUTE PAIN

## 2023-02-27 ASSESSMENT — PAIN DESCRIPTION - FREQUENCY: FREQUENCY: CONTINUOUS

## 2023-02-27 ASSESSMENT — PAIN DESCRIPTION - ORIENTATION: ORIENTATION: LEFT

## 2023-02-27 ASSESSMENT — PAIN - FUNCTIONAL ASSESSMENT: PAIN_FUNCTIONAL_ASSESSMENT: 0-10

## 2023-02-27 ASSESSMENT — PAIN DESCRIPTION - DESCRIPTORS: DESCRIPTORS: ACHING

## 2023-02-27 ASSESSMENT — PAIN DESCRIPTION - LOCATION: LOCATION: HIP

## 2023-02-27 NOTE — LETTER
[place] Department Medicaid  CERTIFICATION OF NECESSITY  FOR NON-EMERGENCY TRANSPORTATION   BY GROUND AMBULANCE      Individual Information   1. Name: Afshan Goldberg 2. [place] Medicaid Billing Number:    3. Address: [facility]      Transportation Provider Information   4. Provider Name: [facility]   5. [place] Medicaid Provider Number:  National Provider Identifier (NPI):      Certification  7. Criteria:  During transport, this individual requires:  [x] Medical treatment or continuous     supervision by an EMT. [x] The administration or regulation of oxygen by another person. [] Supervised protective restraint. 8. Period Beginning Date: 3/2/23   9. Length  [x] Not more than 1 day(s)  [] One Year     Additional Information Relevant to Certification   10. Comments or Explanations, If Necessary or Appropriate     Parkinsons Disease, Confusion alert x2, Fracture of Left Femur with surgical repair     Certifying Practitioner Information   11. Name of Practitioner: Dr Pollo Aponte MD   12. [place] Medicaid Provider Number, If Applicable:  [address] Provider Identifier (NPI):      Signature Information   14. Date of Signature: 3/2/23 15. Name of Person Signing: Oliver Barthel    12. Signature and Professional Designation: Electronically signed by JORGE Rene on 3/2/2023 at 2:23 PM       [facility] [id number]  Rev. 7/2015    [facility] Encounter Date/Time: 2/27/2023 [address] [place] [address] Account: [de-identified]    MRN: [id number]    Patient: Afshan Goldberg    Contact Serial #: [id number]      ENCOUNTER          Patient Class: I Private Enc?   No Unit RM BD: [address] Service: DAMI   Encounter DX: Left displaced femoral n*   ADM Provider: Eliceo Ramos MD   Procedure:     ATT Provider: Eliceo Ramos MD   REF Provider:        Admission DX: Left displaced femoral neck fracture ([facility] [address] Fall, initial encounter, Intertrochanteric fracture of left femur, closed, initial encounter [facility]) and DX codes: J21.923S, [id number]. Rush Napier, K39.196F      PATIENT                 Name: Leti Alvarez : 1950 (81 yrs)   Address: [address] [place] [address] Dr Zamora Sex: Male   City: [address] [place] [address]         Marital Status:    Employer: RETIRED         Baptist: None   Primary Care Provider: None Provider         Primary Phone: [phone]   EMERGENCY CONTACT   Contact Name Legal Guardian? Relationship to Patient Home Phone Work Phone   1. Emily Elizabeth  2. *No Contact Specified*      Spouse    [phone]            GUARANTOR            Guarantor: Leti Alvarez     : 1950   Address: [address] [place] [address] Sex: [place]     [place] [address]     Relation to Patient: Self       Home Phone: [phone]   Guarantor ID: [id number]       Work Phone:     Guarantor Employer: RETIRED         Status: RETIRED      COVERAGE        PRIMARY INSURANCE   Payor: MEDICARE Plan: MEDICARE PART A AND B   Payor Address: [address]       Group Number:   Insurance Type: INDEMNITY   Subscriber Name: Silvano Bañuelos : 1950   Subscriber ID: [id number] Pat. Rel. to Sub: Self   SECONDARY INSURANCE   Payor: UNITED [employer] Plan: [employer] Address:  [facility] [id number], [place], [place] [address]          Group Number:   Insurance Type: INDEMNITY   Subscriber Name: Silvano Bañuelos : 1950   Subscriber ID: [id number] Pat.  Rel. to Sub: SELF

## 2023-02-27 NOTE — CARE COORDINATION
Case Management Assessment  Initial Evaluation    Date/Time of Evaluation: 2/27/2023 5:58 PM  Assessment Completed by: JORGE Victor    If patient is discharged prior to next notation, then this note serves as note for discharge by case management. Patient Name: Mirtha Andrade                   YOB: 1950  Diagnosis: Intertrochanteric fracture of left femur, closed, initial encounter [facility]) Madeline Welch                   Date / Time: 2/27/2023 10:37 AM    Patient Admission Status: Inpatient   Readmission Risk (Low < 19, Mod (19-27), High > 27): No data recorded  Current PCP: None Provider  PCP verified by CM? No (He does not have one)    Chart Reviewed: Yes      History Provided by: Significant Other  Patient Orientation: Unable to Assess (Pt w/tremors d/t Parkinsons and anxiety)    Patient Cognition: Short Term Memory Deficit    Hospitalization in the last 30 days (Readmission):  No    If yes, Readmission Assessment in  Navigator will be completed. Advance Directives:      Code Status: Prior   Patient's Primary Decision Maker is:      Primary Decision Maker: Emily Elizabeth - Spouse - [phone]    Discharge Planning:    Patient lives with:   Type of Home:    Primary Care Giver: Self  Patient Support Systems include:     Current Financial resources:    Current community resources:    Current services prior to admission:              Current DME:              Type of Home Care services:       ADLS  Prior functional level: Assistance with the following:, Shopping, Housework, Cooking, Dressing, Bathing  Current functional level: Assistance with the following:, Bathing, Dressing, Cooking, Housework, Shopping    PT AM-PAC:   /24  OT AM-PAC:   /24    Family can provide assistance at DC: No  Would you like Case Management to discuss the discharge plan with any other family members/significant others, and if so, who?  Yes (SpouseTeresa)  Plans to Return to Present Housing: No (Will likely need QUETA)  Other Identified Issues/Barriers to RETURNING to current housing: limited mobility d/t surgery  Potential Assistance needed at discharge:              Potential DME:    Patient expects to discharge to:    Plan for transportation at discharge:      Financial    Payor: Andrew Diallo / Plan: MEDICARE PART A AND B / Product Type: *No Product type* /     Does insurance require precert for SNF: No    Potential assistance Purchasing Medications:    Meds-to-Beds request:        [address] [phone] Radha Smith [phone]  [address]  [place] Do) [address] [facility] [facility] [address]  Phone: [phone] Fax: [phone]      Notes:    Factors facilitating achievement of predicted outcomes: Family support, Caregiver support, and Has needed Durable Medical Equipment at home    Barriers to discharge: Lower extremity weakness and pt to have surgery- unknown how he will respond. Additional Case Management Notes:   SS Note: Pt is  & lives w/spouse Joseph Romo in 1 story home has one step entry into house. She is care giver. Pt has closed fracture of left femur & is non weightbearing of LLE. Operative intervention 2/28 w/Dr. Arley Tee. Pt has severe Parkinson's disease & sees neurologist [facility] [place] for this. However, he does ambulate without assistive devices at baseline. Pt has Deep brain stimulation (DBS) for his Parkinson's. Pt cannot have Defibrillation/cardioversions d/t DBS. He can ONLY have chest compressions. Pt has following DME: standard walker, extended tub bench and a straight cane. Pt relates that he had a RTKA in 2017. Pt has hx of HHC w[facility] & no QUETA hx. QUETA List given as pt likely need this @ d/c. Requested wife to choose top 3. PCP is NONE. Pt's wife notes she would like Rene Nguyen MD [address] [facility], [address] [place] [address] [phone] for PCP. Pharmacy is [facility] in [address]     The Plan for Transition of Care is related to the following treatment goals of Intertrochanteric fracture of left femur, closed, initial encounter ([facility] [address] Rahul Calderon    The Patient and/or Patient Representative Agree with the Discharge Plan?       JORGE Pickett  Case Management Department  Electronically signed by JORGE Pickett on 2/27/2023 at 6:00 PM

## 2023-02-27 NOTE — CONSULTS
Department of Orthopedic Trauma Surgery  Resident consult note      CHIEF COMPLAINT:   Chief Complaint   Patient presents with    Fall     Pt had a fall at home and is reporting left sided hip pain. HISTORY OF PRESENT ILLNESS:                Patient is a 81 y.o. male who presents with left hip pain after a fall last night. Patient states he was ambulating to the bathroom in the middle of the night when he had a mechanical fall. He had immediate pain to his left hip and was unable to get up or ambulate following the injury. He has a history of severe Parkinson's disease however does ambulate without assistive devices at baseline. .  Denies numbness/tingling/paresthesias. Denies any other orthopedic complaints at this time. Past Medical History:        Diagnosis Date    Parkinson's disease [facility])      Past Surgical History:        Procedure Laterality Date    DEEP BRAIN STIMULATOR PLACEMENT      L chest    FRACTURE SURGERY      nose    JOINT REPLACEMENT Right 05/16/2017    knee    KNEE ARTHROSCOPY Right     TONSILLECTOMY      TOTAL KNEE ARTHROPLASTY Left 9/1/2020    TOTAL LEFT KNEE REPLACEMENT/ ARTHROPLASTY (JOLENE) performed by Surya Soliman DO at [address]     Current Medications:   No current facility-administered medications for this encounter. Allergies:  Seasonal    Social History:   TOBACCO:   reports that he has never smoked. He quit smokeless tobacco use about 18 years ago. ETOH:   reports no history of alcohol use. DRUGS:   reports no history of drug use. ACTIVITIES OF DAILY LIVING:    OCCUPATION:    Family History:   No family history on file.     REVIEW OF SYSTEMS:   Skin: no acute changes  Eyes: no acute changes  Ears/Nose/Throat: no acute changes  Respiratory: No increased work of breathing, no coughing  Cardiovascular: Brisk capillary refill bilaterally, well perfused extremities  Gastrointestinal: no acute changes  Neurologic: no acute changes  MUSCULOSKELETAL:  positive for pain      PHYSICAL EXAM:    VITALS:  BP (!) 164/95   Pulse 86   Temp 98.7 °F (37.1 °C) (Oral)   Resp 18   Ht 5' 9\" (1.753 m)   Wt 150 lb (68 kg)   SpO2 96%   BMI 22.15 kg/m²   Constitutional: Oriented to person, place, and time; Answer questions appropriately  HENT: Head: Atraumatic. Eyes: EOMI  Neck: Trachea midline  Cardiovascular: Brisk capillary refill to all extremities, extremities well perfused  Pulmonary/Chest: No increased work of breathing, no cough  Abdominal: Non-distended  Neurologic:  Awake, alert and oriented in three planes. No gross deficits   MUSCULOSKELETAL:  Left lower Extremity:  Left lower extremity resting in a shortened externally rotated position  Tender to palpation about the left hip  Nontender to palpation about knee and ankle  Small superficial abrasion and ecchymosis over the knee  Positive logroll  Compartments soft and compressible  Palpable dorsalis pedis and posterior tibialis pulse, brisk cap refill to toes, foot warm and perfused  Sensation intact to light touch at baseline level in sural/deep peroneal/superficial peroneal/saphenous/posterior tibial nerve distributions to foot/ankle  Demonstrates active ankle plantar/dorsiflexion/great toe extension      Secondary Exam:   bilateralUE: No obvious signs of trauma. -TTP to fingers, hand, wrist, forearm, elbow, humerus, shoulder or clavicle. -- Patient able to flex/extend fingers, wrist, elbow and shoulder with active and passive ROM without pain, +2/4 Radial pulse, compartments soft and compressible. rightLE: No obvious signs of trauma. -TTP to foot, ankle, leg, knee, thigh, hip.-- Patient able to flex/extend toes, ankle, knee and hip with active and passive ROM without pain,+2/4 DP & PT pulses, compartments soft and compressible.     Pelvis: -TTP, -Log roll, -Heel strike         DATA:    CBC:   Lab Results   Component Value Date/Time    WBC 10.5 02/27/2023 11:23 AM    RBC 4.59 02/27/2023 11:23 AM    HGB 13.5 02/27/2023 11:23 AM    HCT 40.4 02/27/2023 11:23 AM    MCV 88.0 02/27/2023 11:23 AM    MCH 29.4 02/27/2023 11:23 AM    MCHC 33.4 02/27/2023 11:23 AM    RDW 11.9 02/27/2023 11:23 AM     02/27/2023 11:23 AM    MPV 10.1 02/27/2023 11:23 AM     PT/INR:    Lab Results   Component Value Date/Time    PROTIME 12.2 08/31/2020 09:20 AM    INR 1.1 08/31/2020 09:20 AM       Radiology Review:  X-ray left hip and pelvis demonstrate a displaced transcervical femoral neck fracture. There is shortening and external rotation of the femur. Sidney anterior at the fracture site. Total knee arthroplasty hardware in left knee. No other fractures dislocations noted. IMPRESSION:  Left femoral neck fracture  S/p left total knee arthroplasty 2020    PLAN:  Nonweightbearing left lower extremity  Discussed risk and benefits of operative versus nonoperative management the patient. Patient is agreeable to proceed with operative management of this injury. Any and all questions were answered for the patient and family at the time.   Tentative plan for operative intervention tomorrow with Dr. Ca Díaz medical optimization  Preoperative pain control DVT prophylaxis per primary team  Ancef on-call to the OR  Hold anticoagulation  Treatment consent  N.p.o. at midnight  Discuss with attending

## 2023-02-27 NOTE — ED PROVIDER NOTES
[address]      Pt Name: Mera Berg  MRN: [id number]  [place] 1950  Date of evaluation: 2/27/2023  Provider: Bravo Richey DO  PCP: None Provider  Note Started: 4:00 PM EST 2/27/23    CHIEF COMPLAINT       Chief Complaint   Patient presents with    Fall     Pt had a fall at home and is reporting left sided hip pain. HISTORY OF PRESENT ILLNESS: 1 or more Elements   History From: Patient and wife family  Limitations to history : None    Mera Berg is a 81 y.o. male past medical history of Parkinson's disease. Patient presents with chief complaint of fall. Patient states that he was dizzy when he got up this morning and fell. Patient landed onto his left side. Patient noted immediate pain to his left hip. Patient described the pain as sharp aching sensation currently rates pain a 9 out of 10. Patient states pain is worse with palpation and movement he denies any relieving factors. Symptoms. Moderate severity, since onset. Patient states that he has had multiple similar episodes of dizziness in the past secondary to his Parkinson's disease. Patient denies any chest pain or shortness of breath prior to the fall since the fall. Patient denies any fevers, chills, nausea, vomiting, chest pain, Manny pain, constipation or diarrhea    Nursing Notes were all reviewed and agreed with or any disagreements were addressed in the HPI. REVIEW OF SYSTEMS :    Positives and Pertinent negatives as per HPI.      SURGICAL HISTORY     Past Surgical History:   Procedure Laterality Date    DEEP BRAIN STIMULATOR PLACEMENT      L chest    FRACTURE SURGERY      nose    JOINT REPLACEMENT Right 05/16/2017    knee    KNEE ARTHROSCOPY Right     TONSILLECTOMY      TOTAL KNEE ARTHROPLASTY Left 9/1/2020    TOTAL LEFT KNEE REPLACEMENT/ ARTHROPLASTY (JOLENE) performed by Dago Baum DO at [address] [place] [address] Previous Medications    ACETAMINOPHEN (TYLENOL) 500 MG TABLET    Take 1,000 mg by mouth every 6 hours as needed for Pain    CARBIDOPA-LEVODOPA (SINEMET)  MG PER TABLET    Take 2 tablets by mouth 4 times daily 0800 1200 1600 2000    IBUPROFEN (ADVIL;MOTRIN) 200 MG TABLET    Take 400 mg by mouth every 6 hours as needed for Pain    LORATADINE (CLARITIN) 10 MG TABLET    Take 10 mg by mouth daily as needed (Allergies)       ALLERGIES     Seasonal    FAMILYHISTORY     No family history on file. SOCIAL HISTORY       Social History     Tobacco Use    Smoking status: Never    Smokeless tobacco: Former     Quit date: 1/1/2005   Substance Use Topics    Alcohol use: No    Drug use: No       SCREENINGS        Galina Coma Scale  Eye Opening: Spontaneous  Best Verbal Response: Confused  Best Motor Response: Obeys commands  Galina Coma Scale Score: 14                CIWA Assessment  BP: (!) 164/95  Heart Rate: 86           PHYSICAL EXAM  1 or more Elements     ED Triage Vitals [02/27/23 1107]   BP Temp Temp Source Heart Rate Resp SpO2 Height Weight   (!) 152/91 99.1 °F (37.3 °C) Oral 81 16 95 % 5' 9\" (1.753 m) 150 lb (68 kg)         Constitutional/General: Alert and oriented x3  Head: Normocephalic and atraumatic  Eyes: PERRL, EOMI, conjunctiva normal, sclera non icteric  ENT:  Oropharynx clear, handling secretions, no trismus, no asymmetry of the posterior oropharynx or uvular edema  Neck: Supple, full ROM, no stridor, no meningeal signs  Respiratory: Lungs clear to auscultation bilaterally, no wheezes, rales, or rhonchi. Not in respiratory distress  Cardiovascular:  Regular rate. Regular rhythm. No murmurs, no gallops, no rubs. 2+ distal pulses. Equal extremity pulses. Chest: No chest wall tenderness  GI:  Abdomen Soft, Non tender, Non distended. +BS. No rebound, guarding, or rigidity. No pulsatile masses.   Musculoskeletal: On examination of left lower extremity left shortened externally rotated, left lower extremity is neurovascular intact. Pain with pelvic compression on the left side, pain with logroll. Integument: skin warm and dry. No rashes. Small abrasion noted to left knee  Neurologic: GCS 15, no focal deficits, symmetric strength 5/5 in the upper and lower extremities bilaterally  Psychiatric: Normal Affect    DIAGNOSTIC RESULTS   LABS:    Labs Reviewed   CBC WITH AUTO DIFFERENTIAL - Abnormal; Notable for the following components:       Result Value    Neutrophils % 91.2 (*)     Lymphocytes % 7.0 (*)     Monocytes % 1.8 (*)     Neutrophils Absolute 9.56 (*)     Lymphocytes Absolute 0.74 (*)     Eosinophils Absolute 0.00 (*)     All other components within normal limits   COMPREHENSIVE METABOLIC PANEL W/ REFLEX TO MG FOR LOW K - Abnormal; Notable for the following components:    Glucose 105 (*)     Creatinine 0.6 (*)     All other components within normal limits   TROPONIN - Abnormal; Notable for the following components:    Troponin, High Sensitivity 16 (*)     All other components within normal limits   URINALYSIS WITH MICROSCOPIC   TROPONIN    Narrative:     High Sensitivity Troponin T       As interpreted by me, the above displayed labs are abnormal. All other labs obtained during this visit were within normal range or not returned as of this dictation.     EKG Interpretation:  Interpreted by emergency department physician, Kristian Massey DO  Rate: 87  Rhythm: Sinus  Interpretation: EKG obtained demonstrates sinus rhythm short MI, rate 87, left axis, QTc 454, no acute ST segment changes  Comparison: stable as compared to patient's most recent EKG    RADIOLOGY:   Non-plain film images such as CT, Ultrasound and MRI are read by the radiologist. Plain radiographic images are visualized and preliminarily interpreted by the ED Provider with the below findings:    CXR: No acute process  Pelvis left hip x-ray: Left femoral neck fracture  Left knee x-ray no acute bony abnormalities    Interpretation per the Radiologist below, if available at the time of this note:    CT HEAD WO CONTRAST   Final Result   1. Bilateral thalamic nuclei neural stimulators without evidence of   complication. 2.  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 3.  No acute intracranial hemorrhage. CT CERVICAL SPINE WO CONTRAST   Final Result   1. No acute cervical spine abnormality. 2. Severe degenerative changes. Chronic grade 1 anterolisthesis of C2 on C3   and C3 on C4. XR CHEST 1 VIEW   Final Result   No acute process. XR HIP LEFT (2-3 VIEWS)   Final Result   Left femoral neck fracture. XR KNEE LEFT (1-2 VIEWS)   Final Result   No acute bony abnormalities or prosthetic complications. XR HIP LEFT (2-3 VIEWS)    Result Date: 2/27/2023  EXAMINATION: TWO XRAY VIEWS OF THE LEFT HIP 2/27/2023 1:49 pm COMPARISON: None. HISTORY: ORDERING SYSTEM PROVIDED HISTORY: fall TECHNOLOGIST PROVIDED HISTORY: Reason for exam:->fall FINDINGS: There is femoral neck fracture with varus angulation and adjacent soft tissue swelling. Left femoral neck fracture. XR KNEE LEFT (1-2 VIEWS)    Result Date: 2/27/2023  EXAMINATION: TWO XRAY VIEWS OF THE LEFT KNEE 2/27/2023 1:49 pm COMPARISON: None. HISTORY: ORDERING SYSTEM PROVIDED HISTORY: fall, pain TECHNOLOGIST PROVIDED HISTORY: Reason for exam:->fall, pain FINDINGS: Total knee prosthesis in anatomic alignment. No acute fractures or prosthetic complications. There is diffuse anterior soft tissue swelling. No acute bony abnormalities or prosthetic complications. CT HEAD WO CONTRAST    Result Date: 2/27/2023  EXAMINATION: CT OF THE HEAD WITHOUT CONTRAST  2/27/2023 2:21 pm TECHNIQUE: CT of the head was performed without the administration of intravenous contrast. Automated exposure control, iterative reconstruction, and/or weight based adjustment of the mA/kV was utilized to reduce the radiation dose to as low as reasonably achievable. COMPARISON: None. HISTORY: ORDERING SYSTEM PROVIDED HISTORY: fall, headache dizziness TECHNOLOGIST PROVIDED HISTORY: Reason for exam:->fall, headache dizziness Has a \"code stroke\" or \"stroke alert\" been called? ->No Decision Support Exception - unselect if not a suspected or confirmed emergency medical condition->Emergency Medical Condition (MA) FINDINGS: BRAIN/VENTRICLES: There is no acute intracranial hemorrhage, mass effect or midline shift. No abnormal extra-axial fluid collection. The gray-white differentiation is maintained without evidence of an acute infarct. There is no evidence of hydrocephalus. Bilateral neurostimulator devices are noted extending into the anterior thalamus. No evidence of complication. Mild age related cortical atrophy bilaterally. 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 ORBITS: The visualized portion of the orbits demonstrate no acute abnormality. SINUSES: The visualized paranasal sinuses and mastoid air cells demonstrate no acute abnormality. SOFT TISSUES/SKULL:  No acute abnormality of the visualized skull or soft tissues. 1.  Bilateral thalamic nuclei neural stimulators without evidence of complication. 2.  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 3.  No acute intracranial hemorrhage. CT CERVICAL SPINE WO CONTRAST    Result Date: 2/27/2023  EXAMINATION: CT OF THE CERVICAL SPINE WITHOUT CONTRAST 2/27/2023 2:21 pm TECHNIQUE: CT of the cervical spine was performed without the administration of intravenous contrast. Multiplanar reformatted images are provided for review. Automated exposure control, iterative reconstruction, and/or weight based adjustment of the mA/kV was utilized to reduce the radiation dose to as low as reasonably achievable. COMPARISON: None.  HISTORY: ORDERING SYSTEM PROVIDED HISTORY: neck pain, r/o fx TECHNOLOGIST PROVIDED HISTORY: Reason for exam:->neck pain, r/o fx FINDINGS: BONES/ALIGNMENT: The craniocervical and atlantoaxial junctions are intact. The odontoid process is intact. Grade 1 anterolisthesis of C2 on C3 and C3 on C4 likely chronic. Mild reversal of the cervical lordosis. Normal alignment is otherwise maintained. The vertebral body heights are preserved. No fracture or other acute osseous abnormality identified. DEGENERATIVE CHANGES: Severe degenerative disc disease from C3-4 through C6-7. Multilevel bilateral facet arthropathy severe on the right at C2-3 and C3-4. SOFT TISSUES: There is no prevertebral soft tissue swelling. 1. No acute cervical spine abnormality. 2. Severe degenerative changes. Chronic grade 1 anterolisthesis of C2 on C3 and C3 on C4. XR CHEST 1 VIEW    Result Date: 2/27/2023  EXAMINATION: ONE XRAY VIEW OF THE CHEST 2/27/2023 1:49 pm COMPARISON: None. HISTORY: ORDERING SYSTEM PROVIDED HISTORY: fall TECHNOLOGIST PROVIDED HISTORY: Reason for exam:->fall FINDINGS: The lungs are without acute focal process. There is no effusion or pneumothorax. The cardiomediastinal silhouette is without acute process. The osseous structures are without acute process. No acute process. No results found. PROCEDURES   Unless otherwise noted below, none       PAST MEDICAL HISTORY/Chronic Conditions Affecting Care    has a past medical history of Parkinson's disease ([facility] [address] EMERGENCY DEPARTMENT COURSE    Vitals:    Vitals:    02/27/23 1107 02/27/23 1540   BP: (!) 152/91 (!) 164/95   Pulse: 81 86   Resp: 16 18   Temp: 99.1 °F (37.3 °C) 98.7 °F (37.1 °C)   TempSrc: Oral Oral   SpO2: 95% 96%   Weight: 150 lb (68 kg)    Height: 5' 9\" (1.753 m)        Patient was given the following medications:  Medications   morphine (PF) injection 2 mg (2 mg IntraVENous Given 2/27/23 1541)         Is this patient to be included in the SEP-1 Core Measure due to severe sepsis or septic shock? No Exclusion criteria - the patient is NOT to be included for SEP-1 Core Measure due to:  Infection is not suspected      Medical Decision Making/Differential Diagnosis:    CC/HPI Summary, Social Determinants of health, Records Reviewed, DDx, testing done/not done, ED Course, Reassessment, disposition considerations/shared decision making with patient, consults, disposition:      ED Course as of 02/27/23 1625   Mon Feb 27, 2023   5097   ATTENDING PROVIDER ATTESTATION:     I have personally performed and/or participated in the history, exam, medical decision making, and procedures and agree with all pertinent clinical information unless otherwise noted. I have also reviewed and agree with the past medical, family and social history unless otherwise noted. I have discussed this patient in detail with the resident and provided the instruction and education regarding the evidence-based evaluation and treatment of fall with hip pain. Any EKG that may have been performed has been personally reviewed by me and I agree with the documentation as noted by the resident. History: patient presents after a fall with pain in the left hip. He states it hurts to the knee. He has a history of Parkinson. My findings: Mera Berg is a 81 y.o. male whom is in no distress. Physical exam reveals left LE is short and externally rotated. Distal pulses intact. No obvious head injury and no cervical spine tenderness. My plan: Symptomatic and supportive care. Patient to be admitted for orthopedic intervention. Electronically signed by Marian Vásquez DO on 2/27/23 at 2:57 PM EST       [JS]      ED Course User Index  [JS] Marian Vásquez DO        Chronic Conditions:   Past Medical History:   Diagnosis Date    Parkinson's disease ([facility] [address]        CONSULTS: (Who and What was discussed)  IP CONSULT TO ORTHOPEDIC SURGERY  IP CONSULT TO SOCIAL WORK    Discussion with Other Profesionals : Admitting Team Dr. Alma Orellana and Consultant Ortho    Social Determinants : None    Records Reviewed :  Outpatient Notes outpatient [facility] clinic visit    CC/HPI Summary, DDx, ED Course, and Reassessment:   Patient is a 74-year-old male past medical history Parkinson disease. Patient with chief complaint dizziness and fall. Vital signs stable presentation. Physical exam heart regular in rhythm, lungs clear to auscultation bilaterally, abdomen soft nontender no rigidity bound or guarding. On examination left lower extremity left lower extremity shortened externally rotated positive pain with pelvic compression on the left side, left lower extremity is neurovascular intact. Mildly decreased range of motion secondary to pain. No focal deficits. Is at baseline mental status per family at the bedside. [facility] diagnosis includes syncope versus mechanical fall. Given age as well as reported dizziness decision made to obtain EKG and laboratory work. EKG demonstrate no acute ischemic changes. Laboratory work obtained CBC demonstrated no acute abnormalities, CMP demonstrated no acute abnormalities, blood was obtained and was 16 CT scan of the head demonstrated no acute abnormalities, CT scan of the C-spine demonstrated no acute abnormalities, chest x-ray demonstrated no acute abnormalities, recent left hip x-ray demonstrated displaced femoral neck fracture. Left knee x-rays demonstrated no acute abnormalities. Findings consistent with fall resulting in displaced left femoral neck fracture. Patient was given 2 mg of IV morphine for pain. Patient will require admission for orthopedic evaluation. Case was discussed with orthopedic resident who will evaluate patient. Case was discussed with Dr. Jony Vaughn who agreed to admit patient. Plan of care discussed with patient as well as family at the bedside, all questions were answered, patient was agreement plan of care and admitted to the hospital in stable condition.     Disposition Considerations (Tests not ordered but considered, Shared Decision Making, Pt Expectation of Test or Tx.): Shared decision-making discussed with patient as well as family at the bedside. Did discuss findings of left femoral neck fracture as well as expected need for surgery. Patient and family were in agreement plan patient will be admitted to the hospital in stable condition    FINAL IMPRESSION      1. Fall, initial encounter    2. Left displaced femoral neck fracture ([address]          DISPOSITION/PLAN     DISPOSITION Admitted 02/27/2023 04:02:03 PM    PATIENT REFERRED TO:  No follow-up provider specified.     DISCHARGE MEDICATIONS:  New Prescriptions    No medications on file       DISCONTINUED MEDICATIONS:  Discontinued Medications    ASPIRIN 325 MG EC TABLET    Take 1 tablet by mouth 2 times daily for 28 days    SERTRALINE (ZOLOFT) 50 MG TABLET    TAKE 1 2 TABLET BY MOUTH ONCE DAILY FOR 7 DAYS THEN INCREASE TO 1 TABLET DAILY THEREAFTER            (Please note that portions of this note were completed with a voice recognition program.  Efforts were made to edit the dictations but occasionally words are mis-transcribed.)    Jessica Singer DO (electronically signed)       Kamryn Roman DO  Resident  02/27/23 0964

## 2023-02-27 NOTE — ED NOTES
Per family statement patient had a fall last night and is c/o L. sided hip pain.      Dustin Lopez RN  02/27/23 2711

## 2023-02-27 NOTE — PROGRESS NOTES
[address] [place], [place] [address]ist Group   History and Physical      CHIEF COMPLAINT:  Fall while at Home    History of Present Illness: Young Harm. Estefany Gilbert is a   81 y.o. male with a history of parkinson disease, who presents with fall while home. Patient's wife Anuj Luna states that last night patient fell multiple times but denies hitting his head. Patient is complaining of left hip pain which is worse when he tries to stand. Patient prior this recent fall had not fell in a while per wife. Patient is able to answer some of the questions but he mumbles at times and is hard to hear him. Patient does not smoke, use tobacco or alcohol. Patient lives with his wife. Patient has a deep brain stimulator (DBS) on the left upper chest area. Patient is unable to be defibrillated because of the DBS per wife. Informant(s) for H&P:Provided by patient and wife Anuj Luna     REVIEW OF SYSTEMS:  no fevers, chills, cp, sob, n/v, ha, vision/hearing changes, wt changes, hot/cold flashes, other open skin lesions, diarrhea, constipation, dysuria/hematuria unless noted in HPI. Complete ROS performed with the patient and is otherwise negative. PMH:  Past Medical History:   Diagnosis Date    Parkinson's disease [facility])        Surgical History:  Past Surgical History:   Procedure Laterality Date    DEEP BRAIN STIMULATOR PLACEMENT      L chest    FRACTURE SURGERY      nose    JOINT REPLACEMENT Right 05/16/2017    knee    KNEE ARTHROSCOPY Right     TONSILLECTOMY      TOTAL KNEE ARTHROPLASTY Left 9/1/2020    TOTAL LEFT KNEE REPLACEMENT/ ARTHROPLASTY (JOLENE) performed by Cralos Moore DO at [address]       Medications Prior to Admission:    Prior to Admission medications    Medication Sig Start Date End Date Taking?  Authorizing Provider   acetaminophen (TYLENOL) 500 MG tablet Take 1,000 mg by mouth every 6 hours as needed for Pain   Yes Historical Provider, MD   carbidopa-levodopa (SINEMET)  MG per tablet Take 2 tablets by mouth 4 times daily 0800 1200 1600 2000 6/25/20   Historical Provider, MD   loratadine (CLARITIN) 10 MG tablet Take 10 mg by mouth daily as needed (Allergies)    Historical Provider, MD   ibuprofen (ADVIL;MOTRIN) 200 MG tablet Take 400 mg by mouth every 6 hours as needed for Pain    Historical Provider, MD       Allergies:    Seasonal    Social History:    reports that he has never smoked. He quit smokeless tobacco use about 18 years ago. He reports that he does not drink alcohol and does not use drugs. Family History:   family history includes Alzheimer's Disease in his mother; Cancer in his father. PHYSICAL EXAM:  Vitals:  BP (!) 164/95   Pulse 86   Temp 98.7 °F (37.1 °C) (Oral)   Resp 18   Ht 5' 9\" (1.753 m)   Wt 150 lb (68 kg)   SpO2 96%   BMI 22.15 kg/m²     General Appearance: alert and oriented to person, place and time and in no acute distress  Skin: warm and dry  Head: normocephalic and atraumatic  Eyes: pupils equal, round, and reactive to light  Neck: neck supple and non tender without mass   Pulmonary/Chest: clear to auscultation bilaterally- no wheezes, rales or rhonchi, normal air movement, no respiratory distress  Cardiovascular: normal rate, normal S1 and S2 and no carotid bruits  Abdomen: soft, non-tender, non-distended, normal bowel sounds, no masses or organomegaly  Extremities: no cyanosis, no clubbing and no edema, left hip pain with movement  Neurologic: no cranial nerve deficit and speech normal    LABS:  Recent Labs     02/27/23  1123      K 4.0      CO2 25   BUN 13   CREATININE 0.6*   GLUCOSE 105*   CALCIUM 9.3       Recent Labs     02/27/23  1123   WBC 10.5   RBC 4.59   HGB 13.5   HCT 40.4   MCV 88.0   MCH 29.4   MCHC 33.4   RDW 11.9      MPV 10.1       No results for input(s): POCGLU in the last 72 hours. Radiology: XR HIP LEFT (2-3 VIEWS)    Result Date: 2/27/2023  EXAMINATION: TWO XRAY VIEWS OF THE LEFT HIP 2/27/2023 1:49 pm COMPARISON: None. HISTORY: ORDERING SYSTEM PROVIDED HISTORY: fall TECHNOLOGIST PROVIDED HISTORY: Reason for exam:->fall FINDINGS: There is femoral neck fracture with varus angulation and adjacent soft tissue swelling. Left femoral neck fracture. XR KNEE LEFT (1-2 VIEWS)    Result Date: 2/27/2023  EXAMINATION: TWO XRAY VIEWS OF THE LEFT KNEE 2/27/2023 1:49 pm COMPARISON: None. HISTORY: ORDERING SYSTEM PROVIDED HISTORY: fall, pain TECHNOLOGIST PROVIDED HISTORY: Reason for exam:->fall, pain FINDINGS: Total knee prosthesis in anatomic alignment. No acute fractures or prosthetic complications. There is diffuse anterior soft tissue swelling. No acute bony abnormalities or prosthetic complications. CT HEAD WO CONTRAST    Result Date: 2/27/2023  EXAMINATION: CT OF THE HEAD WITHOUT CONTRAST  2/27/2023 2:21 pm TECHNIQUE: CT of the head was performed without the administration of intravenous contrast. Automated exposure control, iterative reconstruction, and/or weight based adjustment of the mA/kV was utilized to reduce the radiation dose to as low as reasonably achievable. COMPARISON: None. HISTORY: ORDERING SYSTEM PROVIDED HISTORY: fall, headache dizziness TECHNOLOGIST PROVIDED HISTORY: Reason for exam:->fall, headache dizziness Has a \"code stroke\" or \"stroke alert\" been called? ->No Decision Support Exception - unselect if not a suspected or confirmed emergency medical condition->Emergency Medical Condition (MA) FINDINGS: BRAIN/VENTRICLES: There is no acute intracranial hemorrhage, mass effect or midline shift. No abnormal extra-axial fluid collection. The gray-white differentiation is maintained without evidence of an acute infarct. There is no evidence of hydrocephalus. Bilateral neurostimulator devices are noted extending into the anterior thalamus. No evidence of complication. Mild age related cortical atrophy bilaterally. 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  ORBITS: The visualized portion of the orbits demonstrate no acute abnormality. SINUSES: The visualized paranasal sinuses and mastoid air cells demonstrate no acute abnormality. SOFT TISSUES/SKULL:  No acute abnormality of the visualized skull or soft tissues. 1.  Bilateral thalamic nuclei neural stimulators without evidence of complication. 2.  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 3.  No acute intracranial hemorrhage. CT CERVICAL SPINE WO CONTRAST    Result Date: 2/27/2023  EXAMINATION: CT OF THE CERVICAL SPINE WITHOUT CONTRAST 2/27/2023 2:21 pm TECHNIQUE: CT of the cervical spine was performed without the administration of intravenous contrast. Multiplanar reformatted images are provided for review. Automated exposure control, iterative reconstruction, and/or weight based adjustment of the mA/kV was utilized to reduce the radiation dose to as low as reasonably achievable. COMPARISON: None. HISTORY: ORDERING SYSTEM PROVIDED HISTORY: neck pain, r/o fx TECHNOLOGIST PROVIDED HISTORY: Reason for exam:->neck pain, r/o fx FINDINGS: BONES/ALIGNMENT: The craniocervical and atlantoaxial junctions are intact. The odontoid process is intact. Grade 1 anterolisthesis of C2 on C3 and C3 on C4 likely chronic. Mild reversal of the cervical lordosis. Normal alignment is otherwise maintained. The vertebral body heights are preserved. No fracture or other acute osseous abnormality identified. DEGENERATIVE CHANGES: Severe degenerative disc disease from C3-4 through C6-7. Multilevel bilateral facet arthropathy severe on the right at C2-3 and C3-4. SOFT TISSUES: There is no prevertebral soft tissue swelling. 1. No acute cervical spine abnormality. 2. Severe degenerative changes. Chronic grade 1 anterolisthesis of C2 on C3 and C3 on C4. XR CHEST 1 VIEW    Result Date: 2/27/2023  EXAMINATION: ONE XRAY VIEW OF THE CHEST 2/27/2023 1:49 pm COMPARISON: None.  HISTORY: ORDERING SYSTEM PROVIDED HISTORY: fall TECHNOLOGIST PROVIDED HISTORY: Reason for exam:->fall FINDINGS: The lungs are without acute focal process. There is no effusion or pneumothorax. The cardiomediastinal silhouette is without acute process. The osseous structures are without acute process. No acute process. EKG: Suspect unspecified pacemaker failure. Sinus rhythm with short ID  Septal infarct , age undetermined. Abnormal ECG  Confirmed by Maggi Little ([id number]) on 2/27/2023 1:52:59 PM    ASSESSMENT:      Principal Problem:    Intertrochanteric fracture of left femur, closed, initial encounter ([facility] [address]  Active Problems:    Parkinsonism ([facility] [address]    Arthritis of left knee  Resolved Problems:    * No resolved hospital problems. *      PLAN:    Left Intertochanteric left femur fracture - left hip pain - pain medication prn - consulted orthopedic surgery - NPO after midnight for surgery tomorrow - post surgery PT/OT will be consulted - strict bedrest for now     History of Parkinson disease - unsteady gait - falls - continue home dose Sinemet - alert but mumbles - follows commands     Falls precaution - high risk for fall - up with assistance post surgery - will monitor      Left knee arthritis - intermittent pain - takes Tylenol or Advil - left knee xray shows left total knee prothesis in anatomic alignment. No acute fractures or prosthetic complications. There is diffuse anterior soft tissue swelling. No acute bony abnormalities or prosthetic complications        Old CVA - per CT scan of head bilateral thalamic nuclei neural stimulators without evidence of complication. 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 No acute intracranial hemorrhage    Degenerative changes of cervical spine - xray demonstrated severe degenerative changes. Chronic grade 1 anterolisthesis of C2 on C3 and C3 on C4. Code Status: Limited Code  DVT prophylaxis: Lovenox    NOTE: This report was transcribed using voice recognition software.  Every effort was made to ensure accuracy; however, inadvertent computerized transcription errors may be present.      Electronically signed by YOLI Manzo CNP on 2/27/2023 at 4:43 PM

## 2023-02-27 NOTE — ACP (ADVANCE CARE PLANNING)
Advance Care Planning     Advance Care Planning Activator (Inpatient)  Conversation Note      Date of ACP Conversation: 2/27/2023     Conversation Conducted with:  Healthcare Decision Maker: Next of Kin by law (only applies in absence of above) (name) Spouse- Derrick Lowry    ACP Activator: Geoffrey Beavers Decision Maker:     Current Designated Health Care Decision Maker:     Primary Decision Maker: Obed Henderson - [phone]  Click here to complete Healthcare Decision Makers including section of the Healthcare Decision Maker Relationship (ie \"Primary\")  Today we documented Decision Maker(s) consistent with Legal Next of Kin hierarchy. Care Preferences    Ventilation: \"If you were in your present state of health and suddenly became very ill and were unable to breathe on your own, what would your preference be about the use of a ventilator (breathing machine) if it were available to you? \"      Would the patient desire the use of ventilator (breathing machine)?: yes    \"If your health worsens and it becomes clear that your chance of recovery is unlikely, what would your preference be about the use of a ventilator (breathing machine) if it were available to you? \"     Would the patient desire the use of ventilator (breathing machine)?: Yes      Resuscitation  \"CPR works best to restart the heart when there is a sudden event, like a heart attack, in someone who is otherwise healthy. Unfortunately, CPR does not typically restart the heart for people who have serious health conditions or who are very sick. \"    \"In the event your heart stopped as a result of an underlying serious health condition, would you want attempts to be made to restart your heart (answer \"yes\" for attempt to resuscitate) or would you prefer a natural death (answer \"no\" for do not attempt to resuscitate)? \" Yes, ONLY Chest compressions.  Pt has Deep brain stimulation (DBS) for his Parkinson's.       [] Yes   [x] No   Educated Patient / Mau Huddleston regarding differences between Advance Directives and portable DNR orders.     Length of ACP Conversation in minutes:  10    Conversation Outcomes:  ACP discussion completed    Follow-up plan:    [] Schedule follow-up conversation to continue planning  [] Referred individual to Provider for additional questions/concerns   [] Advised patient/agent/surrogate to review completed ACP document and update if needed with changes in condition, patient preferences or care setting    [] This note routed to one or more involved healthcare providers  Electronically signed by JORGE Edmonds on 2/27/2023 at 5:47 PM

## 2023-02-28 ENCOUNTER — APPOINTMENT (OUTPATIENT)
Dept: GENERAL RADIOLOGY | Age: 73
DRG: 522 | End: 2023-02-28
Payer: MEDICARE

## 2023-02-28 ENCOUNTER — ANESTHESIA EVENT (OUTPATIENT)
Dept: OPERATING ROOM | Age: 73
End: 2023-02-28
Payer: MEDICARE

## 2023-02-28 ENCOUNTER — ANESTHESIA (OUTPATIENT)
Dept: OPERATING ROOM | Age: 73
End: 2023-02-28
Payer: MEDICARE

## 2023-02-28 LAB
ALBUMIN SERPL-MCNC: 3.9 G/DL (ref 3.5–5.2)
ALP BLD-CCNC: 56 U/L (ref 40–129)
ALT SERPL-CCNC: 5 U/L (ref 0–40)
ANION GAP SERPL CALCULATED.3IONS-SCNC: 13 MMOL/L (ref 7–16)
AST SERPL-CCNC: 36 U/L (ref 0–39)
BASOPHILS ABSOLUTE: 0.06 E9/L (ref 0–0.2)
BASOPHILS RELATIVE PERCENT: 0.6 % (ref 0–2)
BILIRUB SERPL-MCNC: 0.9 MG/DL (ref 0–1.2)
BUN BLDV-MCNC: 19 MG/DL (ref 6–23)
CALCIUM SERPL-MCNC: 8.8 MG/DL (ref 8.6–10.2)
CHLORIDE BLD-SCNC: 105 MMOL/L (ref 98–107)
CO2: 22 MMOL/L (ref 22–29)
CREAT SERPL-MCNC: 0.8 MG/DL (ref 0.7–1.2)
EOSINOPHILS ABSOLUTE: 0.05 E9/L (ref 0.05–0.5)
EOSINOPHILS RELATIVE PERCENT: 0.5 % (ref 0–6)
GFR SERPL CREATININE-BSD FRML MDRD: >60 ML/MIN/1.73
GLUCOSE BLD-MCNC: 111 MG/DL (ref 74–99)
HCT VFR BLD CALC: 37 % (ref 37–54)
HEMOGLOBIN: 12.7 G/DL (ref 12.5–16.5)
IMMATURE GRANULOCYTES #: 0.04 E9/L
IMMATURE GRANULOCYTES %: 0.4 % (ref 0–5)
LYMPHOCYTES ABSOLUTE: 0.62 E9/L (ref 1.5–4)
LYMPHOCYTES RELATIVE PERCENT: 6.2 % (ref 20–42)
MCH RBC QN AUTO: 30.5 PG (ref 26–35)
MCHC RBC AUTO-ENTMCNC: 34.3 % (ref 32–34.5)
MCV RBC AUTO: 88.7 FL (ref 80–99.9)
MONOCYTES ABSOLUTE: 1.01 E9/L (ref 0.1–0.95)
MONOCYTES RELATIVE PERCENT: 10.2 % (ref 2–12)
NEUTROPHILS ABSOLUTE: 8.16 E9/L (ref 1.8–7.3)
NEUTROPHILS RELATIVE PERCENT: 82.1 % (ref 43–80)
PDW BLD-RTO: 12.4 FL (ref 11.5–15)
PLATELET # BLD: 264 E9/L (ref 130–450)
PMV BLD AUTO: 10.5 FL (ref 7–12)
POTASSIUM REFLEX MAGNESIUM: 4.1 MMOL/L (ref 3.5–5)
RBC # BLD: 4.17 E12/L (ref 3.8–5.8)
SODIUM BLD-SCNC: 140 MMOL/L (ref 132–146)
TOTAL PROTEIN: 6.7 G/DL (ref 6.4–8.3)
WBC # BLD: 9.9 E9/L (ref 4.5–11.5)

## 2023-02-28 PROCEDURE — 73502 X-RAY EXAM HIP UNI 2-3 VIEWS: CPT

## 2023-02-28 PROCEDURE — C1776 JOINT DEVICE (IMPLANTABLE): HCPCS | Performed by: ORTHOPAEDIC SURGERY

## 2023-02-28 PROCEDURE — 2580000003 HC RX 258: Performed by: NURSE PRACTITIONER

## 2023-02-28 PROCEDURE — 6360000002 HC RX W HCPCS: Performed by: NURSE ANESTHETIST, CERTIFIED REGISTERED

## 2023-02-28 PROCEDURE — 2500000003 HC RX 250 WO HCPCS: Performed by: NURSE ANESTHETIST, CERTIFIED REGISTERED

## 2023-02-28 PROCEDURE — 7100000000 HC PACU RECOVERY - FIRST 15 MIN: Performed by: ORTHOPAEDIC SURGERY

## 2023-02-28 PROCEDURE — 3600000015 HC SURGERY LEVEL 5 ADDTL 15MIN: Performed by: ORTHOPAEDIC SURGERY

## 2023-02-28 PROCEDURE — 6360000002 HC RX W HCPCS: Performed by: ORTHOPAEDIC SURGERY

## 2023-02-28 PROCEDURE — 36415 COLL VENOUS BLD VENIPUNCTURE: CPT

## 2023-02-28 PROCEDURE — 85025 COMPLETE CBC W/AUTO DIFF WBC: CPT

## 2023-02-28 PROCEDURE — 1200000000 HC SEMI PRIVATE

## 2023-02-28 PROCEDURE — 2580000003 HC RX 258: Performed by: NURSE ANESTHETIST, CERTIFIED REGISTERED

## 2023-02-28 PROCEDURE — 0SRS0JZ REPLACEMENT OF LEFT HIP JOINT, FEMORAL SURFACE WITH SYNTHETIC SUBSTITUTE, OPEN APPROACH: ICD-10-PCS | Performed by: ORTHOPAEDIC SURGERY

## 2023-02-28 PROCEDURE — 99232 SBSQ HOSP IP/OBS MODERATE 35: CPT | Performed by: INTERNAL MEDICINE

## 2023-02-28 PROCEDURE — 88311 DECALCIFY TISSUE: CPT

## 2023-02-28 PROCEDURE — 80053 COMPREHEN METABOLIC PANEL: CPT

## 2023-02-28 PROCEDURE — 2580000003 HC RX 258: Performed by: ORTHOPAEDIC SURGERY

## 2023-02-28 PROCEDURE — 27236 TREAT THIGH FRACTURE: CPT | Performed by: ORTHOPAEDIC SURGERY

## 2023-02-28 PROCEDURE — 3600000005 HC SURGERY LEVEL 5 BASE: Performed by: ORTHOPAEDIC SURGERY

## 2023-02-28 PROCEDURE — 2500000003 HC RX 250 WO HCPCS: Performed by: ORTHOPAEDIC SURGERY

## 2023-02-28 PROCEDURE — 88305 TISSUE EXAM BY PATHOLOGIST: CPT

## 2023-02-28 PROCEDURE — 6370000000 HC RX 637 (ALT 250 FOR IP): Performed by: NURSE PRACTITIONER

## 2023-02-28 PROCEDURE — 2700000000 HC OXYGEN THERAPY PER DAY

## 2023-02-28 PROCEDURE — 3700000001 HC ADD 15 MINUTES (ANESTHESIA): Performed by: ORTHOPAEDIC SURGERY

## 2023-02-28 PROCEDURE — 2709999900 HC NON-CHARGEABLE SUPPLY: Performed by: ORTHOPAEDIC SURGERY

## 2023-02-28 PROCEDURE — 3700000000 HC ANESTHESIA ATTENDED CARE: Performed by: ORTHOPAEDIC SURGERY

## 2023-02-28 PROCEDURE — 7100000001 HC PACU RECOVERY - ADDTL 15 MIN: Performed by: ORTHOPAEDIC SURGERY

## 2023-02-28 DEVICE — HEAD BPLR OD49MM ID28MM FEM HIP SELF CNTR: Type: IMPLANTABLE DEVICE | Site: HIP | Status: FUNCTIONAL

## 2023-02-28 DEVICE — HEAD FEM DIA28MM NK L+1.5MM HIP MTL ON MTL 12/14 TAPR: Type: IMPLANTABLE DEVICE | Site: HIP | Status: FUNCTIONAL

## 2023-02-28 DEVICE — HIP H4 HEMI UNI BIPLR IMPL CAPPED H4: Type: IMPLANTABLE DEVICE | Site: HIP | Status: FUNCTIONAL

## 2023-02-28 DEVICE — STEM FEM SZ 7 L155MM NK L35MM 42MM OFFSET 130DEG BILAT HIP: Type: IMPLANTABLE DEVICE | Site: HIP | Status: FUNCTIONAL

## 2023-02-28 RX ORDER — CEFAZOLIN 2 G/1
INJECTION, POWDER, FOR SOLUTION INTRAMUSCULAR; INTRAVENOUS
Status: DISCONTINUED
Start: 2023-02-28 | End: 2023-02-28

## 2023-02-28 RX ORDER — HYDROMORPHONE HYDROCHLORIDE 1 MG/ML
0.5 INJECTION, SOLUTION INTRAMUSCULAR; INTRAVENOUS; SUBCUTANEOUS EVERY 5 MIN PRN
Status: DISCONTINUED | OUTPATIENT
Start: 2023-02-28 | End: 2023-02-28 | Stop reason: HOSPADM

## 2023-02-28 RX ORDER — FENTANYL CITRATE 50 UG/ML
INJECTION, SOLUTION INTRAMUSCULAR; INTRAVENOUS PRN
Status: DISCONTINUED | OUTPATIENT
Start: 2023-02-28 | End: 2023-02-28 | Stop reason: SDUPTHER

## 2023-02-28 RX ORDER — VANCOMYCIN HYDROCHLORIDE 1 G/20ML
INJECTION, POWDER, LYOPHILIZED, FOR SOLUTION INTRAVENOUS PRN
Status: DISCONTINUED | OUTPATIENT
Start: 2023-02-28 | End: 2023-02-28 | Stop reason: ALTCHOICE

## 2023-02-28 RX ORDER — MEPERIDINE HYDROCHLORIDE 25 MG/ML
12.5 INJECTION INTRAMUSCULAR; INTRAVENOUS; SUBCUTANEOUS ONCE
Status: DISCONTINUED | OUTPATIENT
Start: 2023-02-28 | End: 2023-02-28 | Stop reason: HOSPADM

## 2023-02-28 RX ORDER — GLYCOPYRROLATE 0.2 MG/ML
INJECTION INTRAMUSCULAR; INTRAVENOUS PRN
Status: DISCONTINUED | OUTPATIENT
Start: 2023-02-28 | End: 2023-02-28 | Stop reason: SDUPTHER

## 2023-02-28 RX ORDER — OXYCODONE HYDROCHLORIDE 5 MG/1
10 TABLET ORAL EVERY 4 HOURS PRN
Status: DISCONTINUED | OUTPATIENT
Start: 2023-02-28 | End: 2023-03-03 | Stop reason: HOSPADM

## 2023-02-28 RX ORDER — DEXAMETHASONE SODIUM PHOSPHATE 10 MG/ML
INJECTION, SOLUTION INTRAMUSCULAR; INTRAVENOUS PRN
Status: DISCONTINUED | OUTPATIENT
Start: 2023-02-28 | End: 2023-02-28 | Stop reason: SDUPTHER

## 2023-02-28 RX ORDER — PROPOFOL 10 MG/ML
INJECTION, EMULSION INTRAVENOUS PRN
Status: DISCONTINUED | OUTPATIENT
Start: 2023-02-28 | End: 2023-02-28 | Stop reason: SDUPTHER

## 2023-02-28 RX ORDER — CEFAZOLIN SODIUM 2 G/50ML
2000 SOLUTION INTRAVENOUS ONCE
Status: DISCONTINUED | OUTPATIENT
Start: 2023-02-28 | End: 2023-02-28 | Stop reason: CLARIF

## 2023-02-28 RX ORDER — SODIUM CHLORIDE 0.9 % (FLUSH) 0.9 %
5-40 SYRINGE (ML) INJECTION PRN
Status: DISCONTINUED | OUTPATIENT
Start: 2023-02-28 | End: 2023-03-03 | Stop reason: HOSPADM

## 2023-02-28 RX ORDER — DIPHENHYDRAMINE HYDROCHLORIDE 50 MG/ML
12.5 INJECTION INTRAMUSCULAR; INTRAVENOUS
Status: DISCONTINUED | OUTPATIENT
Start: 2023-02-28 | End: 2023-02-28 | Stop reason: HOSPADM

## 2023-02-28 RX ORDER — SODIUM CHLORIDE 0.9 % (FLUSH) 0.9 %
5-40 SYRINGE (ML) INJECTION EVERY 12 HOURS SCHEDULED
Status: DISCONTINUED | OUTPATIENT
Start: 2023-02-28 | End: 2023-02-28 | Stop reason: HOSPADM

## 2023-02-28 RX ORDER — WATER 1000 ML/1000ML
INJECTION, SOLUTION INTRAVENOUS
Status: DISPENSED
Start: 2023-02-28 | End: 2023-03-01

## 2023-02-28 RX ORDER — LABETALOL HYDROCHLORIDE 5 MG/ML
5 INJECTION, SOLUTION INTRAVENOUS
Status: DISCONTINUED | OUTPATIENT
Start: 2023-02-28 | End: 2023-02-28 | Stop reason: HOSPADM

## 2023-02-28 RX ORDER — MIDAZOLAM HYDROCHLORIDE 1 MG/ML
INJECTION INTRAMUSCULAR; INTRAVENOUS PRN
Status: DISCONTINUED | OUTPATIENT
Start: 2023-02-28 | End: 2023-02-28 | Stop reason: SDUPTHER

## 2023-02-28 RX ORDER — ONDANSETRON 2 MG/ML
INJECTION INTRAMUSCULAR; INTRAVENOUS PRN
Status: DISCONTINUED | OUTPATIENT
Start: 2023-02-28 | End: 2023-02-28 | Stop reason: SDUPTHER

## 2023-02-28 RX ORDER — FENTANYL CITRATE 0.05 MG/ML
25 INJECTION, SOLUTION INTRAMUSCULAR; INTRAVENOUS EVERY 5 MIN PRN
Status: DISCONTINUED | OUTPATIENT
Start: 2023-02-28 | End: 2023-02-28 | Stop reason: HOSPADM

## 2023-02-28 RX ORDER — KETAMINE HYDROCHLORIDE 50 MG/ML
INJECTION, SOLUTION, CONCENTRATE INTRAMUSCULAR; INTRAVENOUS PRN
Status: DISCONTINUED | OUTPATIENT
Start: 2023-02-28 | End: 2023-02-28 | Stop reason: SDUPTHER

## 2023-02-28 RX ORDER — SODIUM CHLORIDE 9 MG/ML
INJECTION, SOLUTION INTRAVENOUS PRN
Status: DISCONTINUED | OUTPATIENT
Start: 2023-02-28 | End: 2023-02-28 | Stop reason: HOSPADM

## 2023-02-28 RX ORDER — PROCHLORPERAZINE EDISYLATE 5 MG/ML
5 INJECTION INTRAMUSCULAR; INTRAVENOUS
Status: DISCONTINUED | OUTPATIENT
Start: 2023-02-28 | End: 2023-02-28 | Stop reason: HOSPADM

## 2023-02-28 RX ORDER — NEOSTIGMINE METHYLSULFATE 1 MG/ML
INJECTION, SOLUTION INTRAVENOUS PRN
Status: DISCONTINUED | OUTPATIENT
Start: 2023-02-28 | End: 2023-02-28 | Stop reason: SDUPTHER

## 2023-02-28 RX ORDER — ROCURONIUM BROMIDE 10 MG/ML
INJECTION, SOLUTION INTRAVENOUS PRN
Status: DISCONTINUED | OUTPATIENT
Start: 2023-02-28 | End: 2023-02-28 | Stop reason: SDUPTHER

## 2023-02-28 RX ORDER — LIDOCAINE HYDROCHLORIDE 20 MG/ML
INJECTION, SOLUTION INTRAVENOUS PRN
Status: DISCONTINUED | OUTPATIENT
Start: 2023-02-28 | End: 2023-02-28 | Stop reason: SDUPTHER

## 2023-02-28 RX ORDER — SODIUM CHLORIDE 9 MG/ML
INJECTION, SOLUTION INTRAVENOUS PRN
Status: DISCONTINUED | OUTPATIENT
Start: 2023-02-28 | End: 2023-03-03 | Stop reason: HOSPADM

## 2023-02-28 RX ORDER — OXYCODONE HYDROCHLORIDE 5 MG/1
5 TABLET ORAL EVERY 4 HOURS PRN
Status: DISCONTINUED | OUTPATIENT
Start: 2023-02-28 | End: 2023-03-03 | Stop reason: HOSPADM

## 2023-02-28 RX ORDER — LABETALOL HYDROCHLORIDE 5 MG/ML
INJECTION, SOLUTION INTRAVENOUS PRN
Status: DISCONTINUED | OUTPATIENT
Start: 2023-02-28 | End: 2023-02-28 | Stop reason: SDUPTHER

## 2023-02-28 RX ORDER — MORPHINE SULFATE 4 MG/ML
4 INJECTION, SOLUTION INTRAMUSCULAR; INTRAVENOUS EVERY 4 HOURS PRN
Status: DISCONTINUED | OUTPATIENT
Start: 2023-02-28 | End: 2023-03-03 | Stop reason: HOSPADM

## 2023-02-28 RX ORDER — HYDRALAZINE HYDROCHLORIDE 20 MG/ML
5 INJECTION INTRAMUSCULAR; INTRAVENOUS
Status: DISCONTINUED | OUTPATIENT
Start: 2023-02-28 | End: 2023-02-28 | Stop reason: HOSPADM

## 2023-02-28 RX ORDER — MORPHINE SULFATE 2 MG/ML
2 INJECTION, SOLUTION INTRAMUSCULAR; INTRAVENOUS EVERY 4 HOURS PRN
Status: DISCONTINUED | OUTPATIENT
Start: 2023-02-28 | End: 2023-03-03 | Stop reason: HOSPADM

## 2023-02-28 RX ORDER — SODIUM CHLORIDE, SODIUM LACTATE, POTASSIUM CHLORIDE, CALCIUM CHLORIDE 600; 310; 30; 20 MG/100ML; MG/100ML; MG/100ML; MG/100ML
INJECTION, SOLUTION INTRAVENOUS CONTINUOUS PRN
Status: DISCONTINUED | OUTPATIENT
Start: 2023-02-28 | End: 2023-02-28 | Stop reason: SDUPTHER

## 2023-02-28 RX ORDER — SODIUM CHLORIDE 0.9 % (FLUSH) 0.9 %
5-40 SYRINGE (ML) INJECTION EVERY 12 HOURS SCHEDULED
Status: DISCONTINUED | OUTPATIENT
Start: 2023-02-28 | End: 2023-03-03 | Stop reason: HOSPADM

## 2023-02-28 RX ORDER — SODIUM CHLORIDE 0.9 % (FLUSH) 0.9 %
5-40 SYRINGE (ML) INJECTION PRN
Status: DISCONTINUED | OUTPATIENT
Start: 2023-02-28 | End: 2023-02-28 | Stop reason: HOSPADM

## 2023-02-28 RX ADMIN — SODIUM CHLORIDE, POTASSIUM CHLORIDE, SODIUM LACTATE AND CALCIUM CHLORIDE: 600; 310; 30; 20 INJECTION, SOLUTION INTRAVENOUS at 15:05

## 2023-02-28 RX ADMIN — PHENYLEPHRINE HYDROCHLORIDE 100 MCG: 10 INJECTION INTRAVENOUS at 15:29

## 2023-02-28 RX ADMIN — FENTANYL CITRATE 100 MCG: 50 INJECTION, SOLUTION INTRAMUSCULAR; INTRAVENOUS at 15:17

## 2023-02-28 RX ADMIN — DEXAMETHASONE SODIUM PHOSPHATE 10 MG: 10 INJECTION INTRAMUSCULAR; INTRAVENOUS at 15:30

## 2023-02-28 RX ADMIN — PROPOFOL 130 MG: 10 INJECTION, EMULSION INTRAVENOUS at 15:17

## 2023-02-28 RX ADMIN — Medication 10 ML: at 09:12

## 2023-02-28 RX ADMIN — ONDANSETRON 4 MG: 2 INJECTION INTRAMUSCULAR; INTRAVENOUS at 16:25

## 2023-02-28 RX ADMIN — FENTANYL CITRATE 50 MCG: 50 INJECTION, SOLUTION INTRAMUSCULAR; INTRAVENOUS at 15:46

## 2023-02-28 RX ADMIN — FENTANYL CITRATE 50 MCG: 50 INJECTION, SOLUTION INTRAMUSCULAR; INTRAVENOUS at 15:52

## 2023-02-28 RX ADMIN — LIDOCAINE HYDROCHLORIDE 80 MG: 20 INJECTION, SOLUTION INTRAVENOUS at 15:17

## 2023-02-28 RX ADMIN — GLYCOPYRROLATE 0.6 MG: 0.2 INJECTION INTRAMUSCULAR; INTRAVENOUS at 16:28

## 2023-02-28 RX ADMIN — CEFAZOLIN 2000 MG: 2 INJECTION, POWDER, FOR SOLUTION INTRAMUSCULAR; INTRAVENOUS at 18:51

## 2023-02-28 RX ADMIN — ROCURONIUM BROMIDE 50 MG: 10 SOLUTION INTRAVENOUS at 15:17

## 2023-02-28 RX ADMIN — PHENYLEPHRINE HYDROCHLORIDE 100 MCG: 10 INJECTION INTRAVENOUS at 16:27

## 2023-02-28 RX ADMIN — FENTANYL CITRATE 50 MCG: 50 INJECTION, SOLUTION INTRAMUSCULAR; INTRAVENOUS at 16:39

## 2023-02-28 RX ADMIN — LABETALOL HYDROCHLORIDE 5 MG: 5 INJECTION INTRAVENOUS at 16:02

## 2023-02-28 RX ADMIN — CEFAZOLIN 2000 MG: 2 INJECTION, POWDER, FOR SOLUTION INTRAMUSCULAR; INTRAVENOUS at 15:25

## 2023-02-28 RX ADMIN — KETAMINE HYDROCHLORIDE 20 MG: 50 INJECTION INTRAMUSCULAR; INTRAVENOUS at 15:17

## 2023-02-28 RX ADMIN — Medication 3 MG: at 16:28

## 2023-02-28 RX ADMIN — TRANEXAMIC ACID 1000 MG: 1 INJECTION, SOLUTION INTRAVENOUS at 15:32

## 2023-02-28 RX ADMIN — LABETALOL HYDROCHLORIDE 5 MG: 5 INJECTION INTRAVENOUS at 15:58

## 2023-02-28 RX ADMIN — CARBIDOPA AND LEVODOPA 2 TABLET: 25; 100 TABLET ORAL at 08:47

## 2023-02-28 RX ADMIN — MIDAZOLAM 0.5 MG: 1 INJECTION INTRAMUSCULAR; INTRAVENOUS at 15:10

## 2023-02-28 RX ADMIN — SODIUM CHLORIDE, POTASSIUM CHLORIDE, SODIUM LACTATE AND CALCIUM CHLORIDE: 600; 310; 30; 20 INJECTION, SOLUTION INTRAVENOUS at 16:24

## 2023-02-28 RX ADMIN — Medication 10 ML: at 21:08

## 2023-02-28 ASSESSMENT — LIFESTYLE VARIABLES
HOW MANY STANDARD DRINKS CONTAINING ALCOHOL DO YOU HAVE ON A TYPICAL DAY: PATIENT DOES NOT DRINK
SMOKING_STATUS: 0
HOW OFTEN DO YOU HAVE A DRINK CONTAINING ALCOHOL: NEVER

## 2023-02-28 NOTE — PROGRESS NOTES
From a medical stand point patient is medically stable for surgery. Patient and wife are aware that patient is medium to high risk for surgery given his history parkinson disease and the fact that he is not very mobile and his age but the benefits out way the risk.

## 2023-02-28 NOTE — PLAN OF CARE
Problem: Pain  Goal: Verbalizes/displays adequate comfort level or baseline comfort level  Outcome: Progressing  Flowsheets (Taken 2/27/2023 2030)  Verbalizes/displays adequate comfort level or baseline comfort level: Encourage patient to monitor pain and request assistance     Problem: Skin/Tissue Integrity  Goal: Absence of new skin breakdown  Description: 1. Monitor for areas of redness and/or skin breakdown  2. Assess vascular access sites hourly  3. Every 4-6 hours minimum:  Change oxygen saturation probe site  4. Every 4-6 hours:  If on nasal continuous positive airway pressure, respiratory therapy assess nares and determine need for appliance change or resting period. Outcome: Progressing     Problem: Confusion  Goal: Confusion, delirium, dementia, or psychosis is improved or at baseline  Description: INTERVENTIONS:  1. Assess for possible contributors to thought disturbance, including medications, impaired vision or hearing, underlying metabolic abnormalities, dehydration, psychiatric diagnoses, and notify attending LIP  2. [place] high risk fall precautions, as indicated  3. Provide frequent short contacts to provide reality reorientation, refocusing and direction  4. Decrease environmental stimuli, including noise as appropriate  5. Monitor and intervene to maintain adequate nutrition, hydration, elimination, sleep and activity  6. If unable to ensure safety without constant attention obtain sitter and review sitter guidelines with assigned personnel  7.  Initiate Psychosocial CNS and Spiritual Care consult, as indicated  Outcome: Progressing     Problem: Safety - Adult  Goal: Free from fall injury  Outcome: Progressing

## 2023-02-28 NOTE — ANESTHESIA PRE PROCEDURE
Department of Anesthesiology  Preprocedure Note       Name:  Afshan Goldberg   Age:  81 y.o.  :  1950                                          MRN:  [id number]         Date:  2023      Surgeon: Frankie Moreno):  PAULETTE Adair DO    Procedure: Procedure(s):  LEFT HIP HEMIARTHROPLASTY (DEPUY)    Medications prior to admission:   Prior to Admission medications    Medication Sig Start Date End Date Taking?  Authorizing Provider   acetaminophen (TYLENOL) 500 MG tablet Take 1,000 mg by mouth every 6 hours as needed for Pain   Yes Historical Provider, MD   carbidopa-levodopa (SINEMET)  MG per tablet Take 2 tablets by mouth 4 times daily 0800 1200 1600 20   Historical Provider, MD   loratadine (CLARITIN) 10 MG tablet Take 10 mg by mouth daily as needed (Allergies)    Historical Provider, MD   ibuprofen (ADVIL;MOTRIN) 200 MG tablet Take 400 mg by mouth every 6 hours as needed for Pain    Historical Provider, MD       Current medications:    Current Facility-Administered Medications   Medication Dose Route Frequency Provider Last Rate Last Admin    carbidopa-levodopa (SINEMET)  MG per tablet 2 tablet  2 tablet Oral 4x Daily Anya Dominion, APRN - CNP   2 tablet at 23 0847    sodium chloride flush 0.9 % injection 5-40 mL  5-40 mL IntraVENous 2 times per day Anya Dominion, APRN - CNP   10 mL at 23 0912    sodium chloride flush 0.9 % injection 5-40 mL  5-40 mL IntraVENous PRN Anya Dominion, APRN - CNP        0.9 % sodium chloride infusion   IntraVENous PRN Anya Dominion, APRN - CNP        enoxaparin (LOVENOX) injection 40 mg  40 mg SubCUTAneous Daily Anya Dominion, APRN - CNP        ondansetron (ZOFRAN-ODT) disintegrating tablet 4 mg  4 mg Oral Q8H PRN Anya Dominion, APRN - CNP        Or    ondansetron [facility]) injection 4 mg  4 mg IntraVENous Q6H PRN Anya Dominion, APRN - CNP        polyethylene glycol (GLYCOLAX) packet 17 g  17 g Oral Daily PRN Anya Dominion, APRN - CNP  acetaminophen (TYLENOL) tablet 650 mg  650 mg Oral Q6H PRN YOLI Yip - CNP        Or    acetaminophen (TYLENOL) suppository 650 mg  650 mg Rectal Q6H PRN Esme Wheatley APRN - CNP           Allergies: Allergies   Allergen Reactions    Seasonal        Problem List:    Patient Active Problem List   Diagnosis Code    S/P knee replacement Z96.659    Parkinsonism ([facility] [address] G20    Arthritis of left knee M17.12    Intertrochanteric fracture of left femur, closed, initial encounter [facility]) S72.142A    Fall W19. Ky Lucas       Past Medical History:        Diagnosis Date    Parkinson's disease [facility])        Past Surgical History:        Procedure Laterality Date    DEEP BRAIN STIMULATOR PLACEMENT      L chest    FRACTURE SURGERY      nose    JOINT REPLACEMENT Right 05/16/2017    knee    KNEE ARTHROSCOPY Right     TONSILLECTOMY      TOTAL KNEE ARTHROPLASTY Left 9/1/2020    TOTAL LEFT KNEE REPLACEMENT/ ARTHROPLASTY (JOLENE) performed by Aida Hollins DO at [address] [facility] [address] History:    Social History     Tobacco Use    Smoking status: Never    Smokeless tobacco: Former     Quit date: 1/1/2005   Substance Use Topics    Alcohol use:  No                                Counseling given: Not Answered      Vital Signs (Current):   Vitals:    02/27/23 1540 02/27/23 1925 02/27/23 2030 02/28/23 0738   BP: (!) 164/95 (!) 166/89 132/83 136/85   Pulse: 86 (!) 103 100 100   Resp: 18 16 16 18   Temp: 98.7 °F (37.1 °C) 98.9 °F (37.2 °C) 98 °F (36.7 °C) 98.3 °F (36.8 °C)   TempSrc: Oral  Oral Oral   SpO2: 96% 92% 93% 92%   Weight:       Height:                                                  BP Readings from Last 3 Encounters:   02/28/23 136/85   09/03/20 104/71   09/01/20 104/66       NPO Status:                                                                                 BMI:   Wt Readings from Last 3 Encounters:   02/27/23 150 lb (68 kg)   03/29/21 140 lb (63.5 kg)   12/28/20 140 lb (63.5 kg)     Body mass index is 22.15 kg/m². CBC:   Lab Results   Component Value Date/Time    WBC 9.9 02/28/2023 07:01 AM    RBC 4.17 02/28/2023 07:01 AM    HGB 12.7 02/28/2023 07:01 AM    HCT 37.0 02/28/2023 07:01 AM    MCV 88.7 02/28/2023 07:01 AM    RDW 12.4 02/28/2023 07:01 AM     02/28/2023 07:01 AM       CMP:   Lab Results   Component Value Date/Time     02/28/2023 07:01 AM    K 4.1 02/28/2023 07:01 AM     02/28/2023 07:01 AM    CO2 22 02/28/2023 07:01 AM    BUN 19 02/28/2023 07:01 AM    CREATININE 0.8 02/28/2023 07:01 AM    GFRAA >60 08/31/2020 09:20 AM    LABGLOM >60 02/28/2023 07:01 AM    GLUCOSE 111 02/28/2023 07:01 AM    PROT 6.7 02/28/2023 07:01 AM    CALCIUM 8.8 02/28/2023 07:01 AM    BILITOT 0.9 02/28/2023 07:01 AM    ALKPHOS 56 02/28/2023 07:01 AM    AST 36 02/28/2023 07:01 AM    ALT 5 02/28/2023 07:01 AM       POC Tests: No results for input(s): POCGLU, POCNA, POCK, POCCL, POCBUN, POCHEMO, POCHCT in the last 72 hours.     Coags:   Lab Results   Component Value Date/Time    PROTIME 15.3 02/27/2023 06:19 PM    INR 1.3 02/27/2023 06:19 PM    APTT 33.3 02/27/2023 06:19 PM       HCG (If Applicable): No results found for: PREGTESTUR, PREGSERUM, HCG, HCGQUANT     ABGs: No results found for: PHART, PO2ART, TUG1ZLK, PDA8UXF, BEART, F2AZAXAI     Type & Screen (If Applicable):  No results found for: LABABO, LABRH    Drug/Infectious Status (If Applicable):  No results found for: HIV, HEPCAB    COVID-19 Screening (If Applicable):   Lab Results   Component Value Date/Time    COVID19 Not Detected 08/27/2020 11:40 AM         XR:  Narrative   EXAMINATION:   TWO XRAY VIEWS OF THE LEFT HIP       2/27/2023 1:49 pm       COMPARISON:   None.       HISTORY:   ORDERING SYSTEM PROVIDED HISTORY: fall   TECHNOLOGIST PROVIDED HISTORY:   Reason for exam:->fall       FINDINGS:   There is femoral neck fracture with varus angulation and adjacent soft tissue   swelling.               Anesthesia Evaluation  Patient summary reviewed and Nursing notes reviewed no history of anesthetic complications:   Airway: Mallampati: III  TM distance: >3 FB   Neck ROM: limited  Mouth opening: > = 3 FB   Dental:    (+) upper dentures      Pulmonary:   (+) decreased breath sounds: bilateral     (-) sleep apnea and not a current smoker                          ROS comment: Seasonal allergies   Cardiovascular:  Exercise tolerance: good (>4 METS),       (-) past MI, CAD, CABG/stent, dysrhythmias and  angina    ECG reviewed  Rhythm: regular  Rate: normal           Beta Blocker:  Not on Beta Blocker      ROS comment: EKG:  Suspect unspecified pacemaker failure  Sinus rhythm with short VA  Septal infarct, age undetermined  Abnormal ECG     Neuro/Psych:   (+) neuromuscular disease (S/P deep brain stimulator placement): Parkinson's disease,    (-) seizures, TIA and CVA           GI/Hepatic/Renal: Neg GI/Hepatic/Renal ROS       (-) hepatitis and no renal disease       Endo/Other:    (+) : arthritis: OA., .    (-) diabetes mellitus, blood dyscrasia        Pt had no PAT visit       Abdominal:         (-) obese       Vascular: negative vascular ROS. - DVT and PE. Other Findings: Parkinsonian movements  DBS          Anesthesia Plan      general     ASA 3     (Modified RSI with HOB at 30 degrees RT  Pre-oxygenation x 3 minutes  20mg Ketamine  PONV prophylaxis)  Induction: intravenous. MIPS: Postoperative opioids intended and Prophylactic antiemetics administered. Anesthetic plan and risks discussed with patient. Plan discussed with TIESHA. Burton Pimentel DO   2/28/2023      Note:  History mainly from chart review and via the patient's wife who is at bedside. DBS to be turned off for the procedure and turned on post operatively by the wife who has the control unit. Anesthesia plan discussed and risks/benefits addressed. Patient's concerns and questions answered. NPO >8 hours. Patient and wife choose to proceed as discussed.   No guarantees implied or stated.

## 2023-02-28 NOTE — CARE COORDINATION
New PCP appt made with Dr. Almaguer on Tue April 4th at 8:15 AM. Electronically signed by Sarah Ratliff on 2/28/2023 at 2:08 PM

## 2023-02-28 NOTE — PROGRESS NOTES
[address] [place], [place] [address]ist   Progress Note    Admitting Date and Time: 2/27/2023 10:37 AM  Admit Dx: Left displaced femoral neck fracture ([facility] [address] Carmel Jen  Fall, initial encounter [W19. XXXA]  Intertrochanteric fracture of left femur, closed, initial encounter ([facility] [address] [S72.142A]    Subjective:    Pt feels like he is falling, he is very jumpy today. Patient denies any pain from his left hip injury. Patient does talk but it is very difficult to understand what he is saying. Patient wife remains at his bedside. Patient is NPO for surgery today. ROS: denies fever, chills, cp, sob, n/v, HA unless stated above.      carbidopa-levodopa  2 tablet Oral 4x Daily    sodium chloride flush  5-40 mL IntraVENous 2 times per day    enoxaparin  40 mg SubCUTAneous Daily     sodium chloride flush, 5-40 mL, PRN  sodium chloride, , PRN  ondansetron, 4 mg, Q8H PRN   Or  ondansetron, 4 mg, Q6H PRN  polyethylene glycol, 17 g, Daily PRN  acetaminophen, 650 mg, Q6H PRN   Or  acetaminophen, 650 mg, Q6H PRN         Objective:    /83   Pulse 100   Temp 98 °F (36.7 °C) (Oral)   Resp 16   Ht 5' 9\" (1.753 m)   Wt 150 lb (68 kg)   SpO2 93%   BMI 22.15 kg/m²   General Appearance: alert and oriented to person, no acute distress  Skin: warm and dry  Head: normocephalic and atraumatic  Eyes: pupils equal, round, and reactive to light  Neck: neck supple and non tender without mass   Pulmonary/Chest: decreased and clear to auscultation bilaterally- no wheezes, rales or rhonchi, normal air movement, no respiratory distress  Cardiovascular: normal rate, normal S1 and S2 and no carotid bruits  Abdomen: soft, non-tender, non-distended, normal bowel sounds   Extremities: no cyanosis, no clubbing and no edema  Neurologic: no cranial nerve deficit and speech abnormal mumbles a lot (voice is very soft)      Recent Labs     02/27/23  1123 02/27/23  1819    141   K 4.0 4.2    104   CO2 25 25   BUN 13 15   CREATININE 0.6* 0.8 GLUCOSE 105* 125*   CALCIUM 9.3 8.9       Recent Labs     02/27/23  1123 02/27/23  1819   ALKPHOS 63 57   PROT 7.1 6.7   LABALBU 4.4 4.0   BILITOT 1.2 1.1   AST 27 31   ALT <5 <5       Recent Labs     02/27/23  1123 02/27/23  1819 02/28/23  0701   WBC 10.5 11.8* 9.9   RBC 4.59 4.30 4.17   HGB 13.5 12.8 12.7   HCT 40.4 38.2 37.0   MCV 88.0 88.8 88.7   MCH 29.4 29.8 30.5   MCHC 33.4 33.5 34.3   RDW 11.9 12.1 12.4    296 264   MPV 10.1 10.2 10.5       Radiology:   CT HEAD WO CONTRAST   Final Result   1. Bilateral thalamic nuclei neural stimulators without evidence of   complication. 2.  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 3.  No acute intracranial hemorrhage. CT CERVICAL SPINE WO CONTRAST   Final Result   1. No acute cervical spine abnormality. 2. Severe degenerative changes. Chronic grade 1 anterolisthesis of C2 on C3   and C3 on C4. XR CHEST 1 VIEW   Final Result   No acute process. XR HIP LEFT (2-3 VIEWS)   Final Result   Left femoral neck fracture. XR KNEE LEFT (1-2 VIEWS)   Final Result   No acute bony abnormalities or prosthetic complications. Assessment:  Principal Problem:    Intertrochanteric fracture of left femur, closed, initial encounter [facility])  Active Problems:    Fall    Parkinsonism ([address]    Arthritis of left knee  Resolved Problems:    * No resolved hospital problems.  *      Plan:    Left Intertochanteric left femur fracture - left hip pain only with ambulation - pain medication prn - orthopedic surgery - NPO  for surgery - post surgery PT/OT will be consulted - strict bedrest for now      History of Parkinson disease - increased jerky like movement noted - unsteady gait - history of multiple falls - continue medication - follows commands      Falls precaution - falls precaution maintained - PT/OT - continue monitor       Left knee arthritis - intermittent pain - takes Tylenol or Advil - left knee xray shows left total knee prothesis in anatomic alignment. No acute fractures or prosthetic complications. There is diffuse anterior soft tissue swelling. No acute bony abnormalities or prosthetic complications         Old CVA - per CT scan of head bilateral thalamic nuclei neural stimulators without evidence of complication. 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 No acute intracranial hemorrhage     Degenerative changes of cervical spine - no c/o pain - xray demonstrated severe degenerative changes. Chronic grade 1 anterolisthesis of C2 on C3 and C3 on C4. NOTE: This report was transcribed using voice recognition software. Every effort was made to ensure accuracy; however, inadvertent computerized transcription errors may be present.      Electronically signed by YOLI Pisano CNP on 2/28/2023 at 7:33 AM

## 2023-02-28 NOTE — CARE COORDINATION
Tried to call Dr. Kelsea Clark office to set up new PCP appt, but office is closed. I did leave a message requesting a call back.  Electronically signed by Evette Rao on 2/28/2023 at 12:07 PM

## 2023-02-28 NOTE — OP NOTE
Operative Note      Patient: Mera Berg  YOB: 1950  MRN: [id number]    Date of Procedure: 2/28/2023    Pre-Op Diagnosis: S/p left hip fracture [Z87.81]    Post-Op Diagnosis:  displaced left femoral neck fracture       Procedure(s):  LEFT HIP HEMIARTHROPLASTY ([employer])    Surgeon(s):  PAULETTE Morelos DO    Assistant:   Resident: Rose Marie Isbell DO; Joseph Hays DO    Anesthesia: General    Estimated Blood Loss (mL): 830 cc    Complications: None    Specimens:   ID Type Source Tests Collected by Time Destination   A : BONE LEFT HIP Bone Bone SURGICAL PATHOLOGY PAULETTE Morelos DO 2/28/2023 1622        Implants:  Implant Name Type Inv.  Item Serial No.  Lot No. LRB No. Used Action   HEAD FEM [id number] NK L+1.5MM HIP MTL ON MTL 12/14 [employer]AP[employer] - [id number]  HEAD FEM [id number] NK L+1.5MM HIP MTL ON MTL 12/14 [employer]AP[employer]  [facility] [employer]- [id number] Left 1 Implanted   HEAD BPLR OD49MM [id number] FEM HIP SELF CNTR - [id number]  HEAD BPLR OD49MM ID28MM FEM HIP SELF CNTR  [facility] [employer]WD [id number] Left 1 Implanted   STEM FEM SZ 7 L155MM NK L35MM 42MM OFFSET 130DEG BILAT HIP - [id number]  STEM FEM SZ 7 L155MM NK L35MM 42MM OFFSET 130DEG BILAT HIP  [facility] [employer]- [id number] Left 1 Implanted         Drains:   Urinary Catheter 02/27/23 Brown (Active)   Catheter Indications Urinary retention (acute or chronic), continuous bladder irrigation or bladder outlet obstruction 02/27/23 2142   Site Assessment No urethral drainage 02/27/23 2142   Urine Color Yellow 02/27/23 2142   Urine Appearance Clear 02/27/23 2142   Collection Container Standard 02/27/23 2142   Securement Method Securing device (Describe) 02/27/23 2142   Catheter Care  Perineal wipes 02/27/23 2142   Catheter Best Practices  Bag below bladder 02/27/23 2142   Status Patent 02/27/23 2142   Output (mL) 200 mL 02/28/23 0532       Findings: Displaced fracture left femoral neck    Detailed Description of Procedure: The patient is brought to operating room after site side identified. Adequate general anesthetic was administered by anesthesia with the patient supine on the operating table. He was then turned to the right lateral decubitus position on the Olympic Vac-Pac with careful axillary padding. The left hip area was then prepped and draped in sterile fashion with the left lower extremity free. The posterior incision was marked and made and deepened down through subcu tissues. This was taken down to the fascia felipe which was incised in line with the skin incision and held with self-retaining retractor. The lower extremity was internally rotated exposing the short external rotators which were elevated from the posterior femur and neck area and hockey-stick shaped incision as a full-thickness layer. These were held with a tenaculum and retracted with a self-retaining retractor. The fracture was identified. A neck cut was made about 45 degrees to the long axis a fingerbreadth above the lesser trochanter. The neck fragment was removed and the head was removed from the acetabulum and measured 49 mm. The trial ball was placed in the acetabulum and a 49 fit well with excellent stability and seating. The proximal femur was exposed to allow opening of the proximal femur with a box chisel and canal probe then with broaches ultimately to a size 7 which gave excellent fit and fill and stability. The neck was planed in the neck segment was applied. The +1.5 mm neck length head ball was applied with the 49 mm bipolar componentry and the hip was reduced and taken through range of motion. Good length was established and excellent stability was noted. The final components were chosen to match these. The trials were then removed and the area was thoroughly irrigated. The definitive stem was then impacted with excellent stability.   The combined head ball and bipolar ball were then impacted onto the neck taper and were stable. The acetabulum was irrigated and then the hip was reduced and taken through range of motion with showing stability and motion at least equal to the trials. After final irrigation the capsule was closed with 0 Vicryl figure-of-eight sutures. The piriformis reapproximated beneath the gluteus medius tendon at the trochanter. Vancomycin powder was placed in the joint the joint was closed in layers with absorbable suture. Stainless steel staples were placed in the skin. Aquacel Ag was used as dressing. The patient's anesthetic was reversed and he was taken recovery in stable condition. All reasonable efforts have been made to minimize the risk of errors that may occur in the use of voice recognition and other electronic means of charting.       Electronically signed by Toby Garcia DO on 2/28/2023 at 4:48 PM

## 2023-02-28 NOTE — CARE COORDINATION
HUSSEIN met with patient and wife who is at bedside. She Is aware that patient will likely need rehab after surgery. She is agreeable. Patient will need Therapy evals after surgery but if appropriate wife would like to try for [facility]. Referral made to [facility], she can review after surgery and therapy evals to see if able to accept. She will review QUETA list and be prepared to provide choices if [facility] can not accept. NO PRECERT will be needed for rehab. Will follow for final plan after surgery.   HUSSEIN provided CM assistant with information to make PCP appointment after DC for patient with Dr Crenshaw Client per wife request.

## 2023-02-28 NOTE — H&P
YOLI Crockett - CNP   Nurse Practitioner   Internal Medicine   Progress Notes      Attested   Date of Service:  2/27/2023  4:06 PM        Attested        Attestation signed by Mikael Patterson MD at 2/27/2023  6:59 PM     Attending Supervising Physician's Attestation Statement  I saw and evaluated the patient. I discussed the findings and plans with the Physician Assistant or Nurse Practioner. I reviewed and agree with the findings and plan as documented in this note. Expand All Collapse All                 [address] [place], [place] [address]ist Group   History and Physical        CHIEF COMPLAINT:  Fall while at Home     History of Present Illness: Jillian Tariqwilliosvaldo. Doug Reddy is a   81 y.o. male with a history of parkinson disease, who presents with fall while home. Patient's wife Stormy Fraga states that last night patient fell multiple times but denies hitting his head. Patient is complaining of left hip pain which is worse when he tries to stand. Patient prior this recent fall had not fell in a while per wife. Patient is able to answer some of the questions but he mumbles at times and is hard to hear him. Patient does not smoke, use tobacco or alcohol. Patient lives with his wife. Patient has a deep brain stimulator (DBS) on the left upper chest area. Patient is unable to be defibrillated because of the DBS per wife. Informant(s) for H&P:Provided by patient and wife Stormy Fraga      REVIEW OF SYSTEMS:  no fevers, chills, cp, sob, n/v, ha, vision/hearing changes, wt changes, hot/cold flashes, other open skin lesions, diarrhea, constipation, dysuria/hematuria unless noted in HPI. Complete ROS performed with the patient and is otherwise negative.         PMH:  Past Medical History        Past Medical History:   Diagnosis Date    Parkinson's disease [facility])              Surgical History:  Past Surgical History         Past Surgical History:   Procedure Laterality Date    DEEP BRAIN STIMULATOR PLACEMENT         L chest FRACTURE SURGERY         nose    JOINT REPLACEMENT Right 05/16/2017     knee    KNEE ARTHROSCOPY Right      TONSILLECTOMY        TOTAL KNEE ARTHROPLASTY Left 9/1/2020     TOTAL LEFT KNEE REPLACEMENT/ ARTHROPLASTY (JOLENE) performed by Magdiel Mcbride DO at [address]            Medications Prior to Admission:    Home Medications           Prior to Admission medications    Medication Sig Start Date End Date Taking? Authorizing Provider   acetaminophen (TYLENOL) 500 MG tablet Take 1,000 mg by mouth every 6 hours as needed for Pain     Yes Historical Provider, MD   carbidopa-levodopa (SINEMET)  MG per tablet Take 2 tablets by mouth 4 times daily 0800 1200 1600 2000 6/25/20     Historical Provider, MD   loratadine (CLARITIN) 10 MG tablet Take 10 mg by mouth daily as needed (Allergies)       Historical Provider, MD   ibuprofen (ADVIL;MOTRIN) 200 MG tablet Take 400 mg by mouth every 6 hours as needed for Pain       Historical Provider, MD            Allergies:    Seasonal     Social History:    reports that he has never smoked. He quit smokeless tobacco use about 18 years ago. He reports that he does not drink alcohol and does not use drugs. Family History:   family history includes Alzheimer's Disease in his mother; Cancer in his father.       PHYSICAL EXAM:  Vitals:  BP (!) 164/95   Pulse 86   Temp 98.7 °F (37.1 °C) (Oral)   Resp 18   Ht 5' 9\" (1.753 m)   Wt 150 lb (68 kg)   SpO2 96%   BMI 22.15 kg/m²      General Appearance: alert and oriented to person, place and time and in no acute distress  Skin: warm and dry  Head: normocephalic and atraumatic  Eyes: pupils equal, round, and reactive to light  Neck: neck supple and non tender without mass   Pulmonary/Chest: clear to auscultation bilaterally- no wheezes, rales or rhonchi, normal air movement, no respiratory distress  Cardiovascular: normal rate, normal S1 and S2 and no carotid bruits  Abdomen: soft, non-tender, non-distended, normal bowel sounds, no masses or organomegaly  Extremities: no cyanosis, no clubbing and no edema, left hip pain with movement  Neurologic: no cranial nerve deficit and speech normal     LABS:      Recent Labs     02/27/23  1123      K 4.0      CO2 25   BUN 13   CREATININE 0.6*   GLUCOSE 105*   CALCIUM 9.3             Recent Labs     02/27/23  1123   WBC 10.5   RBC 4.59   HGB 13.5   HCT 40.4   MCV 88.0   MCH 29.4   MCHC 33.4   RDW 11.9      MPV 10.1         No results for input(s): POCGLU in the last 72 hours. Radiology: XR HIP LEFT (2-3 VIEWS)     Result Date: 2/27/2023  EXAMINATION: TWO XRAY VIEWS OF THE LEFT HIP 2/27/2023 1:49 pm COMPARISON: None. HISTORY: ORDERING SYSTEM PROVIDED HISTORY: fall TECHNOLOGIST PROVIDED HISTORY: Reason for exam:->fall FINDINGS: There is femoral neck fracture with varus angulation and adjacent soft tissue swelling. Left femoral neck fracture. XR KNEE LEFT (1-2 VIEWS)     Result Date: 2/27/2023  EXAMINATION: TWO XRAY VIEWS OF THE LEFT KNEE 2/27/2023 1:49 pm COMPARISON: None. HISTORY: ORDERING SYSTEM PROVIDED HISTORY: fall, pain TECHNOLOGIST PROVIDED HISTORY: Reason for exam:->fall, pain FINDINGS: Total knee prosthesis in anatomic alignment. No acute fractures or prosthetic complications. There is diffuse anterior soft tissue swelling. No acute bony abnormalities or prosthetic complications. CT HEAD WO CONTRAST     Result Date: 2/27/2023  EXAMINATION: CT OF THE HEAD WITHOUT CONTRAST  2/27/2023 2:21 pm TECHNIQUE: CT of the head was performed without the administration of intravenous contrast. Automated exposure control, iterative reconstruction, and/or weight based adjustment of the mA/kV was utilized to reduce the radiation dose to as low as reasonably achievable. COMPARISON: None.  HISTORY: ORDERING SYSTEM PROVIDED HISTORY: fall, headache dizziness TECHNOLOGIST PROVIDED HISTORY: Reason for exam:->fall, headache dizziness Has a \"code stroke\" or \"stroke alert\" been called? ->No Decision Support Exception - unselect if not a suspected or confirmed emergency medical condition->Emergency Medical Condition (MA) FINDINGS: BRAIN/VENTRICLES: There is no acute intracranial hemorrhage, mass effect or midline shift. No abnormal extra-axial fluid collection. The gray-white differentiation is maintained without evidence of an acute infarct. There is no evidence of hydrocephalus. Bilateral neurostimulator devices are noted extending into the anterior thalamus. No evidence of complication. Mild age related cortical atrophy bilaterally. 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 ORBITS: The visualized portion of the orbits demonstrate no acute abnormality. SINUSES: The visualized paranasal sinuses and mastoid air cells demonstrate no acute abnormality. SOFT TISSUES/SKULL:  No acute abnormality of the visualized skull or soft tissues. 1.  Bilateral thalamic nuclei neural stimulators without evidence of complication. 2.  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 3.  No acute intracranial hemorrhage. CT CERVICAL SPINE WO CONTRAST     Result Date: 2/27/2023  EXAMINATION: CT OF THE CERVICAL SPINE WITHOUT CONTRAST 2/27/2023 2:21 pm TECHNIQUE: CT of the cervical spine was performed without the administration of intravenous contrast. Multiplanar reformatted images are provided for review. Automated exposure control, iterative reconstruction, and/or weight based adjustment of the mA/kV was utilized to reduce the radiation dose to as low as reasonably achievable. COMPARISON: None. HISTORY: ORDERING SYSTEM PROVIDED HISTORY: neck pain, r/o fx TECHNOLOGIST PROVIDED HISTORY: Reason for exam:->neck pain, r/o fx FINDINGS: BONES/ALIGNMENT: The craniocervical and atlantoaxial junctions are intact. The odontoid process is intact. Grade 1 anterolisthesis of C2 on C3 and C3 on C4 likely chronic. Mild reversal of the cervical lordosis.   Normal alignment is otherwise maintained. The vertebral body heights are preserved. No fracture or other acute osseous abnormality identified. DEGENERATIVE CHANGES: Severe degenerative disc disease from C3-4 through C6-7. Multilevel bilateral facet arthropathy severe on the right at C2-3 and C3-4. SOFT TISSUES: There is no prevertebral soft tissue swelling. 1. No acute cervical spine abnormality. 2. Severe degenerative changes. Chronic grade 1 anterolisthesis of C2 on C3 and C3 on C4. XR CHEST 1 VIEW     Result Date: 2/27/2023  EXAMINATION: ONE XRAY VIEW OF THE CHEST 2/27/2023 1:49 pm COMPARISON: None. HISTORY: ORDERING SYSTEM PROVIDED HISTORY: fall TECHNOLOGIST PROVIDED HISTORY: Reason for exam:->fall FINDINGS: The lungs are without acute focal process. There is no effusion or pneumothorax. The cardiomediastinal silhouette is without acute process. The osseous structures are without acute process. No acute process. EKG: Suspect unspecified pacemaker failure. Sinus rhythm with short HI  Septal infarct , age undetermined. Abnormal ECG  Confirmed by Nancy Lung ([id number]) on 2/27/2023 1:52:59 PM     ASSESSMENT:       Principal Problem:    Intertrochanteric fracture of left femur, closed, initial encounter ([facility] [address]  Active Problems:    Parkinsonism ([address]    Arthritis of left knee  Resolved Problems:    * No resolved hospital problems.  *        PLAN:     Left Intertochanteric left femur fracture - left hip pain - pain medication prn - consulted orthopedic surgery - NPO after midnight for surgery tomorrow - post surgery PT/OT will be consulted - strict bedrest for now      History of Parkinson disease - unsteady gait - falls - continue home dose Sinemet - alert but mumbles - follows commands      Falls precaution - high risk for fall - up with assistance post surgery - will monitor       Left knee arthritis - intermittent pain - takes Tylenol or Advil - left knee xray shows left total knee prothesis in anatomic alignment. No acute fractures or prosthetic complications. There is diffuse anterior soft tissue swelling. No acute bony abnormalities or prosthetic complications         Old CVA - per CT scan of head bilateral thalamic nuclei neural stimulators without evidence of complication. 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 No acute intracranial hemorrhage     Degenerative changes of cervical spine - xray demonstrated severe degenerative changes. Chronic grade 1 anterolisthesis of C2 on C3 and C3 on C4. Code Status: Limited Code  DVT prophylaxis: Lovenox     NOTE: This report was transcribed using voice recognition software. Every effort was made to ensure accuracy; however, inadvertent computerized transcription errors may be present. Electronically signed by YOLI Dunham CNP on 2/27/2023 at 4:43 PM                    Cosigned by:  Dominique Stark MD at 2/27/2023  6:59 PM     Revision History

## 2023-03-01 LAB
ANION GAP SERPL CALCULATED.3IONS-SCNC: 11 MMOL/L (ref 7–16)
BASOPHILS ABSOLUTE: 0.02 E9/L (ref 0–0.2)
BASOPHILS RELATIVE PERCENT: 0.2 % (ref 0–2)
BUN BLDV-MCNC: 34 MG/DL (ref 6–23)
CALCIUM SERPL-MCNC: 9 MG/DL (ref 8.6–10.2)
CHLORIDE BLD-SCNC: 103 MMOL/L (ref 98–107)
CO2: 26 MMOL/L (ref 22–29)
CREAT SERPL-MCNC: 1.1 MG/DL (ref 0.7–1.2)
EOSINOPHILS ABSOLUTE: 0.01 E9/L (ref 0.05–0.5)
EOSINOPHILS RELATIVE PERCENT: 0.1 % (ref 0–6)
GFR SERPL CREATININE-BSD FRML MDRD: >60 ML/MIN/1.73
GLUCOSE BLD-MCNC: 154 MG/DL (ref 74–99)
HCT VFR BLD CALC: 36.8 % (ref 37–54)
HEMOGLOBIN: 11.6 G/DL (ref 12.5–16.5)
IMMATURE GRANULOCYTES #: 0.04 E9/L
IMMATURE GRANULOCYTES %: 0.4 % (ref 0–5)
LYMPHOCYTES ABSOLUTE: 0.98 E9/L (ref 1.5–4)
LYMPHOCYTES RELATIVE PERCENT: 9.2 % (ref 20–42)
MCH RBC QN AUTO: 29.4 PG (ref 26–35)
MCHC RBC AUTO-ENTMCNC: 31.5 % (ref 32–34.5)
MCV RBC AUTO: 93.4 FL (ref 80–99.9)
MONOCYTES ABSOLUTE: 1.18 E9/L (ref 0.1–0.95)
MONOCYTES RELATIVE PERCENT: 11.1 % (ref 2–12)
NEUTROPHILS ABSOLUTE: 8.38 E9/L (ref 1.8–7.3)
NEUTROPHILS RELATIVE PERCENT: 79 % (ref 43–80)
PDW BLD-RTO: 12.4 FL (ref 11.5–15)
PLATELET # BLD: 322 E9/L (ref 130–450)
PMV BLD AUTO: 10.6 FL (ref 7–12)
POTASSIUM SERPL-SCNC: 4.1 MMOL/L (ref 3.5–5)
RBC # BLD: 3.94 E12/L (ref 3.8–5.8)
SODIUM BLD-SCNC: 140 MMOL/L (ref 132–146)
WBC # BLD: 10.6 E9/L (ref 4.5–11.5)

## 2023-03-01 PROCEDURE — 6370000000 HC RX 637 (ALT 250 FOR IP): Performed by: ORTHOPAEDIC SURGERY

## 2023-03-01 PROCEDURE — 36415 COLL VENOUS BLD VENIPUNCTURE: CPT

## 2023-03-01 PROCEDURE — APPSS30 APP SPLIT SHARED TIME 16-30 MINUTES: Performed by: NURSE PRACTITIONER

## 2023-03-01 PROCEDURE — 97110 THERAPEUTIC EXERCISES: CPT | Performed by: PHYSICAL THERAPIST

## 2023-03-01 PROCEDURE — 97165 OT EVAL LOW COMPLEX 30 MIN: CPT

## 2023-03-01 PROCEDURE — 85025 COMPLETE CBC W/AUTO DIFF WBC: CPT

## 2023-03-01 PROCEDURE — 6360000002 HC RX W HCPCS: Performed by: ORTHOPAEDIC SURGERY

## 2023-03-01 PROCEDURE — 97161 PT EVAL LOW COMPLEX 20 MIN: CPT | Performed by: PHYSICAL THERAPIST

## 2023-03-01 PROCEDURE — 2700000000 HC OXYGEN THERAPY PER DAY

## 2023-03-01 PROCEDURE — 99232 SBSQ HOSP IP/OBS MODERATE 35: CPT | Performed by: INTERNAL MEDICINE

## 2023-03-01 PROCEDURE — 2580000003 HC RX 258: Performed by: ORTHOPAEDIC SURGERY

## 2023-03-01 PROCEDURE — 1200000000 HC SEMI PRIVATE

## 2023-03-01 PROCEDURE — 97535 SELF CARE MNGMENT TRAINING: CPT

## 2023-03-01 PROCEDURE — 80048 BASIC METABOLIC PNL TOTAL CA: CPT

## 2023-03-01 RX ORDER — DIPHENHYDRAMINE HYDROCHLORIDE 50 MG/ML
50 INJECTION INTRAMUSCULAR; INTRAVENOUS ONCE
Status: DISCONTINUED | OUTPATIENT
Start: 2023-03-01 | End: 2023-03-03 | Stop reason: HOSPADM

## 2023-03-01 RX ADMIN — OXYCODONE HYDROCHLORIDE 5 MG: 5 TABLET ORAL at 09:26

## 2023-03-01 RX ADMIN — CARBIDOPA AND LEVODOPA 2 TABLET: 25; 100 TABLET ORAL at 09:25

## 2023-03-01 RX ADMIN — MORPHINE SULFATE 2 MG: 2 INJECTION, SOLUTION INTRAMUSCULAR; INTRAVENOUS at 02:15

## 2023-03-01 RX ADMIN — CARBIDOPA AND LEVODOPA 2 TABLET: 25; 100 TABLET ORAL at 20:48

## 2023-03-01 RX ADMIN — CARBIDOPA AND LEVODOPA 2 TABLET: 25; 100 TABLET ORAL at 16:50

## 2023-03-01 RX ADMIN — CEFAZOLIN 2000 MG: 2 INJECTION, POWDER, FOR SOLUTION INTRAMUSCULAR; INTRAVENOUS at 00:32

## 2023-03-01 RX ADMIN — CARBIDOPA AND LEVODOPA 2 TABLET: 25; 100 TABLET ORAL at 13:43

## 2023-03-01 RX ADMIN — ENOXAPARIN SODIUM 40 MG: 100 INJECTION SUBCUTANEOUS at 09:26

## 2023-03-01 RX ADMIN — ACETAMINOPHEN 650 MG: 325 TABLET ORAL at 16:50

## 2023-03-01 ASSESSMENT — PAIN DESCRIPTION - LOCATION
LOCATION: HIP
LOCATION: HIP

## 2023-03-01 ASSESSMENT — PAIN SCALES - GENERAL
PAINLEVEL_OUTOF10: 6
PAINLEVEL_OUTOF10: 0
PAINLEVEL_OUTOF10: 7
PAINLEVEL_OUTOF10: 0
PAINLEVEL_OUTOF10: 0

## 2023-03-01 ASSESSMENT — PAIN DESCRIPTION - ORIENTATION
ORIENTATION: LEFT
ORIENTATION: LEFT

## 2023-03-01 ASSESSMENT — PAIN SCALES - WONG BAKER
WONGBAKER_NUMERICALRESPONSE: 0

## 2023-03-01 NOTE — PROGRESS NOTES
[address] [place], [place] [address]ist   Progress Note    Admitting Date and Time: 2/27/2023 10:37 AM  Admit Dx: Left displaced femoral neck fracture ([facility] [address] Deirdre Viveros  Fall, initial encounter [W19. XXXA]  Intertrochanteric fracture of left femur, closed, initial encounter ([facility] [address] [S72.142A]    Subjective:    Pt lying in bed with 1:1 sitter at his bedside. Patient is more alert and able to answer questions. Patient denies any complaints of pain. Patient is not as jerky or shaky as yesterday. ROS: denies fever, chills, cp, sob, n/v, HA unless stated above.      diphenhydrAMINE  50 mg IntraVENous Once    sodium chloride flush  5-40 mL IntraVENous 2 times per day    carbidopa-levodopa  2 tablet Oral 4x Daily    sodium chloride flush  5-40 mL IntraVENous 2 times per day    enoxaparin  40 mg SubCUTAneous Daily     sodium chloride flush, 5-40 mL, PRN  sodium chloride, , PRN  oxyCODONE, 5 mg, Q4H PRN   Or  oxyCODONE, 10 mg, Q4H PRN  morphine, 2 mg, Q4H PRN   Or  morphine, 4 mg, Q4H PRN  sodium chloride flush, 5-40 mL, PRN  sodium chloride, , PRN  ondansetron, 4 mg, Q8H PRN   Or  ondansetron, 4 mg, Q6H PRN  polyethylene glycol, 17 g, Daily PRN  acetaminophen, 650 mg, Q6H PRN   Or  acetaminophen, 650 mg, Q6H PRN         Objective:    BP (!) 144/90   Pulse 91   Temp 97.7 °F (36.5 °C) (Axillary)   Resp 16   Ht 5' 9\" (1.753 m)   Wt 150 lb (68 kg)   SpO2 95%   BMI 22.15 kg/m²   General Appearance: alert and oriented to person, confused to place and time and in no acute distress  Skin: warm and dry, left hip dressing dry and intact   Head: normocephalic and atraumatic  Eyes: pupils equal, round, and reactive to light  Neck: neck supple and non tender without mass   Pulmonary/Chest: clear to auscultation bilaterally, normal air movement, no respiratory distress  Cardiovascular: normal rate, normal S1 and S2 and no carotid bruits  Abdomen: soft, non-tender, non-distended, normal bowel sounds   Extremities: no cyanosis, no clubbing and no edema  Neurologic: no cranial nerve deficit and speech abnormal speech at times mumbles and speaks very low       Recent Labs     02/27/23  1123 02/27/23  1819 02/28/23  0701    141 140   K 4.0 4.2 4.1    104 105   CO2 25 25 22   BUN 13 15 19   CREATININE 0.6* 0.8 0.8   GLUCOSE 105* 125* 111*   CALCIUM 9.3 8.9 8.8       Recent Labs     02/27/23  1123 02/27/23  1819 02/28/23  0701   ALKPHOS 63 57 56   PROT 7.1 6.7 6.7   LABALBU 4.4 4.0 3.9   BILITOT 1.2 1.1 0.9   AST 27 31 36   ALT <5 <5 5       Recent Labs     02/27/23 1123 02/27/23 1819 02/28/23  0701   WBC 10.5 11.8* 9.9   RBC 4.59 4.30 4.17   HGB 13.5 12.8 12.7   HCT 40.4 38.2 37.0   MCV 88.0 88.8 88.7   MCH 29.4 29.8 30.5   MCHC 33.4 33.5 34.3   RDW 11.9 12.1 12.4    296 264   MPV 10.1 10.2 10.5        Radiology:   XR HIP LEFT (2-3 VIEWS)   Final Result   Status post left hip hemiarthroplasty. CT HEAD WO CONTRAST   Final Result   1. Bilateral thalamic nuclei neural stimulators without evidence of   complication. 2.  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 3.  No acute intracranial hemorrhage. CT CERVICAL SPINE WO CONTRAST   Final Result   1. No acute cervical spine abnormality. 2. Severe degenerative changes. Chronic grade 1 anterolisthesis of C2 on C3   and C3 on C4. XR CHEST 1 VIEW   Final Result   No acute process. XR HIP LEFT (2-3 VIEWS)   Final Result   Left femoral neck fracture. XR KNEE LEFT (1-2 VIEWS)   Final Result   No acute bony abnormalities or prosthetic complications. Assessment:  Principal Problem:    Intertrochanteric fracture of left femur, closed, initial encounter [facility])  Active Problems:    Fall    Parkinsonism ([address]    Arthritis of left knee  Resolved Problems:    * No resolved hospital problems.  *      Plan:    Left Intertochanteric left femur fracture - S/p left hemiarthroplasty - pain medication prn - orthopedic surgery on - PT/OT -  dressing dry and intact      History of Parkinson disease - jerky like movement noted - unsteady gait - history of multiple falls - follows commands - on Carbidopa-levodopa - also has a DBS      Falls precaution - bedside 1:1 sitter at bedside - falls precaution maintained - PT/OT following - continue monitor       Left knee arthritis - no c/o pain but not walking - takes Tylenol or Advil - left knee xray shows left total knee prothesis in anatomic alignment. No acute fractures or prosthetic complications. There is diffuse anterior soft tissue swelling. No acute bony abnormalities or prosthetic complications         Old CVA - per CT scan of head bilateral thalamic nuclei neural stimulators without evidence of complication. 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 No acute intracranial hemorrhage - uses cane      Degenerative changes of cervical spine - no c/o pain - xray demonstrated severe degenerative changes. Chronic grade 1 anterolisthesis of C2 on C3 and C3 on C4. Disposition - discharge planned for next 24-48 hrs     NOTE: This report was transcribed using voice recognition software. Every effort was made to ensure accuracy; however, inadvertent computerized transcription errors may be present.      Electronically signed by YOLI Yip CNP on 3/1/2023 at 7:27 AM

## 2023-03-01 NOTE — PLAN OF CARE
Problem: Pain  Goal: Verbalizes/displays adequate comfort level or baseline comfort level  Outcome: Progressing     Problem: Skin/Tissue Integrity  Goal: Absence of new skin breakdown  Description: 1. Monitor for areas of redness and/or skin breakdown  2. Assess vascular access sites hourly  3. Every 4-6 hours minimum:  Change oxygen saturation probe site  4. Every 4-6 hours:  If on nasal continuous positive airway pressure, respiratory therapy assess nares and determine need for appliance change or resting period. Outcome: Progressing     Problem: Confusion  Goal: Confusion, delirium, dementia, or psychosis is improved or at baseline  Description: INTERVENTIONS:  1. Assess for possible contributors to thought disturbance, including medications, impaired vision or hearing, underlying metabolic abnormalities, dehydration, psychiatric diagnoses, and notify attending LIP  2. [place] high risk fall precautions, as indicated  3. Provide frequent short contacts to provide reality reorientation, refocusing and direction  4. Decrease environmental stimuli, including noise as appropriate  5. Monitor and intervene to maintain adequate nutrition, hydration, elimination, sleep and activity  6. If unable to ensure safety without constant attention obtain sitter and review sitter guidelines with assigned personnel  7.  Initiate Psychosocial CNS and Spiritual Care consult, as indicated  Outcome: Progressing     Problem: Safety - Adult  Goal: Free from fall injury  Outcome: Progressing     Problem: Discharge Planning  Goal: Discharge to home or other facility with appropriate resources  Outcome: Progressing

## 2023-03-01 NOTE — ANESTHESIA POSTPROCEDURE EVALUATION
Department of Anesthesiology  Postprocedure Note    Patient: Josephine Barger  MRN: [id number]  YOB: 1950  Date of evaluation: 2/28/2023      Procedure Summary     Date: 02/28/23 Room / Location: [address] [place] [address] / [facility]    Anesthesia Start: 97 70 84 Anesthesia Stop: 1656    Procedure: LEFT HIP HEMIARTHROPLASTY ([place]) (Left: Hip) Diagnosis:       S/p left hip fracture      (S/p left hip fracture [Z87.81])    Surgeons: Toby Garcia DO Responsible Provider: Traci Veliz MD    Anesthesia Type: general ASA Status: 3          Anesthesia Type: No value filed.     Malcolm Phase I: Malcolm Score: 8    Malcolm Phase II:        Anesthesia Post Evaluation    Patient location during evaluation: PACU  Patient participation: complete - patient participated  Level of consciousness: awake  Airway patency: patent  Nausea & Vomiting: no nausea and no vomiting  Complications: no  Cardiovascular status: hemodynamically stable  Respiratory status: acceptable  Hydration status: euvolemic

## 2023-03-01 NOTE — PROGRESS NOTES
Physical Therapy    Physical Therapy Initial Evaluation/Plan of Care    Room #:  1398/1479-63  Patient Name: Ligia Phillips  YOB: 1950  MRN: [id number]    Date of Service: 3/1/2023     Tentative placement recommendation: Acute Rehab  Equipment recommendation: To be determined      Evaluating Physical Therapist: Sana Rich  #[id number]      Specific Provider Orders/Date/Referring Provider :  02/28/23 1915    PT eval and treat  Start:  02/28/23 1915,   End:  02/28/23 1915,   ONE TIME,   Standing Count:  1 Occurrences,   R         PAULETTE Youngblood DO     Admitting Diagnosis:   Left displaced femoral neck fracture ([facility] [address] Janine Gouty  Fall, initial encounter [W19. XXXA]  Intertrochanteric fracture of left femur, closed, initial encounter [facility]) [B44.936C]       Surgery: s/p left hit hemiarthroplasty 2/28/23 dr Hinojosa Inch  Visit Diagnoses         Codes    Fall, initial encounter    -  Primary W19. XXXA    Left displaced femoral neck fracture ([facility] [address]     S72.002A    S/p left hip fracture     Z87.81            Patient Active Problem List   Diagnosis    S/P knee replacement    Parkinsonism ([facility] [address]    Arthritis of left knee    Intertrochanteric fracture of left femur, closed, initial encounter ([facility] [address]    Fall        ASSESSMENT of Current Deficits Patient exhibits decreased strength, balance, endurance, range of motion, coordination, and pain left hip  impairing functional mobility, transfers, gait , gait distance, and tolerance to activity are barriers to d/c and require skilled intervention to address concerns listed above to increase safety and independence at discharge. Decreased strength, balance and endurance  increases patient's risk for fall. Poor trunk strength and control along with tremors and pain limit sitting balance and progression to standing.   Skilled intervention of 2 clinicians required to facilitate participation, promote active engagement to progress towards goals and prevent injury of patient and staff        PHYSICAL THERAPY  PLAN OF CARE       Physical therapy plan of care is established based on physician order,  patient diagnosis and clinical assessment    Current Treatment Recommendations:    -Bed Mobility: Lower extremity exercises , Upper extremity exercises , and Trunk control activities   -Sitting Balance: Incorporate reaching activities to activate trunk muscles , Hands on support to maintain midline , and Facilitate postural control in all planes   -Standing Balance: Perform strengthening exercises in standing to promote motor control with or without upper extremity support  and Instruct patient on adequate base of support to maintain balance  -Transfers: Provide instruction on proper hand and foot position for adequate transfer of weight onto lower extremities and use of gait device if needed and Cues for hand placement, technique and safety. Provide stabilization to prevent fall   -Endurance: Utilize Supervised activities to increase level of endurance to allow for safe functional mobility including transfers and gait     PT long term treatment goals are located in below grid    Patient and or family understand(s) diagnosis, prognosis, and plan of care. Frequency of treatments: Patient will be seen  twice daily.          Prior Level of Function: Patient ambulated independently with occasional falls  Rehab Potential: good   for baseline    Past medical history:   Past Medical History:   Diagnosis Date    Parkinson's disease ([facility] [address]      Past Surgical History:   Procedure Laterality Date    DEEP BRAIN STIMULATOR PLACEMENT      L chest    FRACTURE SURGERY      nose    JOINT REPLACEMENT Right 05/16/2017    knee    KNEE ARTHROSCOPY Right     TONSILLECTOMY      TOTAL KNEE ARTHROPLASTY Left 9/1/2020    TOTAL LEFT KNEE REPLACEMENT/ ARTHROPLASTY (JOLENE) performed by Herlinda Luna DO at [facility]:    Precautions: Ambulate patient  and incentive spirometer, falls, alarm, O2, and left full weight bearing    , brain stimulator for tremors, parkinson's, hip precaution  Goes by [employer]"  Social history: Patient lives with spouse in a ranch home  with 1 step  to enter home. Walk in shower        Equipment owned: Ty Lint, and Tub transfer bench,  grab bars    AM-PAC Basic Mobility        AM-PAC Basic Mobility - Inpatient   How much help is needed turning from your back to your side while in a flat bed without using bedrails?: Total  How much help is needed moving from lying on your back to sitting on the side of a flat bed without using bedrails?: Total  How much help is needed moving to and from a bed to a chair?: Total  How much help is needed standing up from a chair using your arms?: Total  How much help is needed walking in hospital room?: Total  How much help is needed climbing 3-5 steps with a railing?: Total  AM-PAC Inpatient Mobility Raw Score : 6  AM-PAC Inpatient T-Scale Score : 23.55  Mobility Inpatient CMS 0-100% Score: 100  Mobility Inpatient CMS G-Code Modifier : CN    Nursing cleared patient for PT evaluation. The admitting diagnosis and active problem list as listed above have been reviewed prior to the initiation of this evaluation. OBJECTIVE;   Initial Evaluation  Date: 3/1/2023 Treatment Date:     Short Term/ Long Term   Goals   Was pt agreeable to Eval/treatment? Yes  To be met in 5 days   Pain level   5/10  left hip     Bed Mobility    Rolling: Maximal assist of  2    Supine to sit: Maximal assist of 1    Sit to supine: Dependent of  2    Scooting: Maximal assist of 1    Rolling: Moderate assist of 1    Supine to sit: Moderate assist of 1    Sit to supine: Moderate assist of 1    Scooting: Moderate assist of 1     Transfers Sit to stand: Not assessed due to pt overall debility, decreased activity tolerance, balance deficits, safety and fall risk. Sit to stand:  Moderate assist of  2 to steady    ROM Within functional limits  with except left hip with in precautions; difficulty with left hip extension d/t pain and rigidity  Increase range of motion 10% of affected joints    Strength BUE:  refer to OT eval  RLE:  3/5  LLE:  2/5  Increase strength in affected mm groups by 1/3 grade   Balance Sitting EOB:  poor 2 person assist progressing to intermittent min a of 1  Dynamic Standing:  not assessed    Sitting EOB:  fair sit edge of bed 5 min supervision   Dynamic Standing: fair with steday     Patient is Alert & Oriented x person, place, and situation and follows one step directions    Sensation:  Patient  denies numbness/tingling   Edema:  yes left lower extremity   Endurance: poor tolerance to sitting d/t pain and overall debility         Patient education  Patient educated on role of Physical Therapy, risks of immobility, safety and plan of care, importance of positional changes for oxygen exchange,  importance of mobility while in hospital , ankle pumps, quad set and glut set for edema control, blood clot prevention, safety , weight bearing status , left knee extension in bed and during stance phase of gait, and positioning for skin integrity and comfort     Patient response to education:   Pt verbalized understanding Pt demonstrated skill Pt requires further education in this area   No No Yes    Wife with good understanding  Treatment:  Patient practiced and was instructed/facilitated in the following treatment: Patient   Sat edge of bed 15 minutes with dep assist and progressed to min a intermittently  assist to increase dynamic sitting balance and activity tolerance. Supine ex     Therapeutic Exercises:  ankle pumps, heel slide, hip abduction/adduction, and shoulder elevation  x 10 reps. A/a    At end of session, patient in care of Occupational Therapy with OT present call light and phone within reach,  all lines and tubes intact, nursing notified.       Patient would benefit from continued skilled Physical Therapy to improve functional independence and quality of life. Patient's/ family goals   rehab    Time in  1108  Time out  1141    Total Treatment Time  13 minutes    Evaluation time includes thorough review of current medical information, gathering information on past medical history/social history and prior level of function, completion of standardized testing/informal observation of tasks, assessment of data, and development of Plan of care and goals.      CPT codes:  Low Complexity PT evaluation (84101)  Therapeutic exercises (38638)   13 minutes  1 unit(s)    Lydia Correa PT

## 2023-03-01 NOTE — PROGRESS NOTES
[address] [facility]  [facility] Dominick Zavala. [place]        Date:3/1/2023                                                  Patient Name: Robina Ott    MRN: [id number]    : 1950    Room: [address] [place] [address]      Evaluating OT: Shaggy Schroeder OTR/L #[id number]     Referring Provider and Specific Provider Orders/Date:      23  OT eval and treat  Start:  23,   End:  23,   ONE TIME,   Standing Count:  1 Occurrences,   R         K Kamryn Iyer DO      Placement Recommendation: Acute Rehab        Diagnosis:   1. Fall, initial encounter    2. Left displaced femoral neck fracture ([address]    3.  S/p left hip fracture         Surgery: S/p LEFT HIP HEMIARTHROPLASTY 3/1/23 by Dr. Kamryn Iyer      Pertinent Medical History:       Past Medical History:   Diagnosis Date    Parkinson's disease [facility])          Past Surgical History:   Procedure Laterality Date    DEEP BRAIN STIMULATOR PLACEMENT      L chest    FRACTURE SURGERY      nose    JOINT REPLACEMENT Right 2017    knee    KNEE ARTHROSCOPY Right     TONSILLECTOMY      TOTAL KNEE ARTHROPLASTY Left 2020    TOTAL LEFT KNEE REPLACEMENT/ ARTHROPLASTY (JOLENE) performed by Huang Mcintosh DO at [facility] OR     Precautions:  Fall Risk, alarm, full weight bearing as tolerated, activity beginning POD #1, hx of Parkinson's disease and deep brain stimulator placement (left chest), posterior hip precautions      Assessment of current deficits    [x] Functional mobility  [x]ADLs  [x] Strength               [x]Cognition    [x] Functional transfers   [x] IADLs         [x] Safety Awareness   [x]Endurance    [x] Fine Coordination              [x] Balance      [] Vision/perception   []Sensation     [x]Gross Motor Coordination  [x] ROM  [] Delirium                   [x] Motor Control     OT PLAN OF CARE   OT POC based on physician orders, patient diagnosis and results of clinical assessment    Frequency/Duration 2-5 days/wk for 2 weeks BID PRN     Specific OT Treatment Interventions to include:   * Instruction/training on adapted ADL techniques and AE recommendations to increase functional independence within precautions       * Training on energy conservation strategies, correct breathing pattern and techniques to improve independence/tolerance for self-care routine  * Functional transfer/mobility training/DME recommendations for increased independence, safety, and fall prevention  * Patient/Family education to increase follow through with safety techniques and functional independence  * Recommendation of environmental modifications for increased safety with functional transfers/mobility and ADLs  * Cognitive retraining/development of therapeutic activities to improve problem solving, judgement, memory, and attention for increased safety/participation in ADL/IADL tasks  * Therapeutic exercise to improve motor endurance, ROM, and functional strength for ADLs/functional transfers  * Therapeutic activities to facilitate/challenge dynamic balance, stand tolerance for increased safety and independence with ADLs  * Therapeutic activities to facilitate gross/fine motor skills for increased independence with ADLs  * Neuro-muscular re-education: facilitation of righting/equilibrium reactions, midline orientation, scapular stability/mobility, normalization of muscle tone, and facilitation of volitional active controled movement  * Positioning to improve skin integrity, interaction with environment and functional independence    Recommended Adaptive Equipment: TBD      Home Living: Lives with wife, single family home, 1 story, 1 step to enter. Bathroom set-up: Walk-in shower         Equipment owned: standard walker, cane, shower chair     Prior Level of Function: Wife assists with ADLs as needed, ambulated independently without device.      Pain Level: No signs/reports of pain; Nursing notified. Cognition: A&O: 2/4; Follows 1-2 step directions   Memory: impaired    Sequencing: impaired    Problem solving: impaired    Judgement/safety: impaired     [facility]   AM-PAC Daily Activity - Inpatient   How much help is needed for putting on and taking off regular lower body clothing?: Total  How much help is needed for bathing (which includes washing, rinsing, drying)?: A Lot  How much help is needed for toileting (which includes using toilet, bedpan, or urinal)?: Total  How much help is needed for putting on and taking off regular upper body clothing?: A Lot  How much help is needed for taking care of personal grooming?: A Lot  How much help for eating meals?: A Lot  AM-[facility] Inpatient Daily Activity Raw Score: 10  AM-PAC Inpatient ADL T-Scale Score : 27.31  ADL Inpatient CMS 0-100% Score: 74.7  ADL Inpatient CMS G-Code Modifier : CL     Functional Assessment:    Initial Eval Status  Date: 3/1/23   Treatment Status  Date: STGs = LTGs  Time frame: 10-14 days   Feeding Moderate Assist   Patient positioned upright in bed with all items within reach to improve function and safety with feeding. Patient requiring assist to retrieve food from tray with utensil and bring utensil to mouth with hand over hand assist.   Built-up foam piece provided to improve grasp on utensil. Supervision    Grooming Maximal Assist     Supervision    UB Dressing Maximal Assist    Supervision    LB Dressing Dependent   Assist to lift heels off bed to don/doff socks at bed level    Minimal Assist    Bathing Maximal Assist     Minimal Assist    Toileting Dependent   Patient has olmstead catheter     Moderate Assist    Bed Mobility  Supine to sit: Maximal Assist x 1   Sit to supine: Maximal Assist x 2     Supine to sit: Minimal Assist   Sit to supine: Minimal Assist    Functional Transfers NT due to pt overall debility, decreased activity tolerance, balance deficits, safety and fall risk.      Moderate Assist    Functional Mobility Not Assessed     Moderate Assist with use of wheeled walker      Balance Sitting:     Static: Mod/Max A initially with strong posterior lean progressing to CGA-Min A with patient utilizing bed rails to improve balance/posture     Dynamic: poor    Standing: n/a     Sitting:     Static: fair plus     Dynamic: fair plus  Standing: fair with walker    Activity Tolerance fair  ; sitting tolerance at EOB up to x 10 mins   Increase standing tolerance >1-2 minutes for improved engagement with functional transfers and indep in ADLs     Visual/  Perceptual Glasses: Yes, not present at time of eval     Impaired scanning and saccades noted during vision screen sitting at EOB     NA      Hand Dominance: Right      AROM (PROM) Strength Additional Info:  Goal:   RUE  AAROM WFL  2+/5 Fair minus  and poor FMC/dexterity noted during ADL tasks   Improve overall RUE strength  for participation in functional tasks   LUE [place], [employer] [facility] [place] 2+/5 Fair minus  and poor FMC/dexterity noted during ADL tasks   Improve overall LUE strength  for participation in functional tasks     Hearing:  [place], [employer] [facility] [employer]   Sensation:   No c/o numbness or tingling  Tone:  UB/LB impaired 2/2 PD, hypertonic throughout. Edema: None     Comments: RN cleared patient for OT. Upon arrival patient in supine, wife at bedside. Therapist facilitated and instructed pt on adapted  techniques & compensatory strategies to improve safety and independence with basic ADLs, bed mobility, functional transfers and mobility  to allow pt to achieve highest level of independence and safely. Pt demonstrated poor understanding of education & follow through. At end of session, patient was supine and repositioned for feeding, blankets between patient's LEs to maintain hip precautions, with call light and phone within reach, all lines and tubes intact. Overall, patient demonstrated  decreased independence and safety during completion of ADL tasks.   Pt would benefit from continued skilled OT to increase safety and independence with completion of ADL tasks and functional mobility for improved quality of life. Treatment: OT treatment provided this date includes:   Instructions/training on safety, sequencing, and adapted techniques for completion of ADLs. Facilitated bed mobility with cues for proper body mechanics and sequencing to prepare for ADL completion. Facilitated proper positioning/alignment to improve interaction with environment, breathing, overall functioning and decrease/prevent edema. Sitting EOB x 10 minutes to improve dynamic sitting balance and activity tolerance during ADLs    Rehab Potential: Good for established goals. Patient / Family Goal: wife in agreement with POC      Patient and/or family were instructed on functional diagnosis, prognosis/goals and OT plan of care. Demonstrated fair understanding. Eval Complexity: Low    Time In: 11:25 AM   Time Out: 11:58 AM    Total Treatment Time: 9    Non billable time x 9 mins working with PT       Min Units   OT Eval Low 97165  X  1    OT Eval Medium 29899      OT Eval High 95887      OT Re-Eval [id number]            ADL/Self Care 44241 9 1   Therapeutic Activities 12873       Therapeutic Ex [id number]       Orthotic Management 17698       Manual [id number]     Neuro Re-Ed 11229       Non-Billable Time 9 0      Evaluation Time additionally includes thorough review of current medical information, gathering information on past medical history/social history and prior level of function, interpretation of standardized testing/informal observation of tasks, assessment of data and development of plan of care and goals.         Evaluating OT: Jess Rush OTR/L #[id number]

## 2023-03-01 NOTE — PROGRESS NOTES
Department of Orthopedic Surgery  Resident Progress Note    POD1. Patient seen and examined. Pain controlled, patient states he has no pain today. No new complaints. Denies chest pain, shortness of breath, dizziness/lightheadedness. VITALS:  /79   Pulse 95   Temp 97.7 °F (36.5 °C) (Axillary)   Resp 16   Ht 5' 9\" (1.753 m)   Wt 150 lb (68 kg)   SpO2 95%   BMI 22.15 kg/m²     General: Patient is awake and alert at bedside    MUSCULOSKELETAL:   left lower extremity:  Dressing C/D/I, no strike through  Left leg is flexed at the knee resting in bed  Compartments soft and compressible  +PF/DF/EHL, difficult to fully assess range of motion ankle secondary to patient not understanding instructions.   Patient does appear to have some baseline tremors  +2/4 DP & PT pulses, Brisk Cap refill, Toes warm and perfused  Distal sensation grossly intact to Peroneals, Sural, Saphenous, and tibial nrs    CBC:   Lab Results   Component Value Date/Time    WBC 9.9 02/28/2023 07:01 AM    HGB 12.7 02/28/2023 07:01 AM    HCT 37.0 02/28/2023 07:01 AM     02/28/2023 07:01 AM     PT/INR:    Lab Results   Component Value Date/Time    PROTIME 15.3 02/27/2023 06:19 PM    INR 1.3 02/27/2023 06:19 PM       ASSESSMENT  S/P LEFT HIP HEMIARTHROPLASTY     PLAN    Weightbearing as tolerated  Pain management , medical management, DVT prophylaxis per admitting  Appreciate PT OT recommendations  Continue 24-hour antibiotics will complete today  Fall precautions

## 2023-03-01 NOTE — CARE COORDINATION
Therapy nemesio noted. Spoke with Zulay Jacobsen at [facility]. She states they will follow for now, will need to see if patient can do better with therapy tomorrow to be able to tolerate and participate in 3 hours of therapy a day at [facility]. Spoke with wife, explained if he can't go to [facility] will need to make [facility] referrals. She states is very reluctant to give [facility] choice but understands he needs to be able to participate more in therapy. She is willing to let  refer to [facility] as a back up. Referral to [facility]. Await acceptance.

## 2023-03-02 VITALS
OXYGEN SATURATION: 94 % | HEART RATE: 96 BPM | SYSTOLIC BLOOD PRESSURE: 105 MMHG | BODY MASS INDEX: 22.22 KG/M2 | HEIGHT: 69 IN | WEIGHT: 150 LBS | TEMPERATURE: 98.7 F | DIASTOLIC BLOOD PRESSURE: 82 MMHG | RESPIRATION RATE: 18 BRPM

## 2023-03-02 LAB
INFLUENZA A: NOT DETECTED
INFLUENZA B: NOT DETECTED
SARS-COV-2 RNA, RT PCR: NOT DETECTED

## 2023-03-02 PROCEDURE — 99232 SBSQ HOSP IP/OBS MODERATE 35: CPT | Performed by: INTERNAL MEDICINE

## 2023-03-02 PROCEDURE — 6360000002 HC RX W HCPCS: Performed by: ORTHOPAEDIC SURGERY

## 2023-03-02 PROCEDURE — 97110 THERAPEUTIC EXERCISES: CPT

## 2023-03-02 PROCEDURE — 6370000000 HC RX 637 (ALT 250 FOR IP): Performed by: ORTHOPAEDIC SURGERY

## 2023-03-02 PROCEDURE — 97530 THERAPEUTIC ACTIVITIES: CPT

## 2023-03-02 PROCEDURE — 51798 US URINE CAPACITY MEASURE: CPT

## 2023-03-02 PROCEDURE — 2580000003 HC RX 258: Performed by: ORTHOPAEDIC SURGERY

## 2023-03-02 PROCEDURE — 87636 SARSCOV2 & INF A&B AMP PRB: CPT

## 2023-03-02 RX ADMIN — CARBIDOPA AND LEVODOPA 2 TABLET: 25; 100 TABLET ORAL at 12:40

## 2023-03-02 RX ADMIN — CARBIDOPA AND LEVODOPA 2 TABLET: 25; 100 TABLET ORAL at 09:08

## 2023-03-02 RX ADMIN — Medication 10 ML: at 09:08

## 2023-03-02 RX ADMIN — ENOXAPARIN SODIUM 40 MG: 100 INJECTION SUBCUTANEOUS at 09:08

## 2023-03-02 RX ADMIN — ACETAMINOPHEN 650 MG: 325 TABLET ORAL at 09:07

## 2023-03-02 RX ADMIN — CARBIDOPA AND LEVODOPA 2 TABLET: 25; 100 TABLET ORAL at 16:14

## 2023-03-02 RX ADMIN — ACETAMINOPHEN 650 MG: 325 TABLET ORAL at 16:40

## 2023-03-02 ASSESSMENT — PAIN DESCRIPTION - ORIENTATION
ORIENTATION: LEFT
ORIENTATION: LEFT

## 2023-03-02 ASSESSMENT — PAIN DESCRIPTION - LOCATION
LOCATION: HIP
LOCATION: HIP

## 2023-03-02 ASSESSMENT — PAIN SCALES - GENERAL
PAINLEVEL_OUTOF10: 3
PAINLEVEL_OUTOF10: 6

## 2023-03-02 NOTE — PLAN OF CARE
Problem: Pain  Goal: Verbalizes/displays adequate comfort level or baseline comfort level  Outcome: Progressing  Flowsheets (Taken 3/1/2023 2015 by Lady Gambino RN)  Verbalizes/displays adequate comfort level or baseline comfort level: Encourage patient to monitor pain and request assistance     Problem: Skin/Tissue Integrity  Goal: Absence of new skin breakdown  Description: 1. Monitor for areas of redness and/or skin breakdown  2. Assess vascular access sites hourly  3. Every 4-6 hours minimum:  Change oxygen saturation probe site  4. Every 4-6 hours:  If on nasal continuous positive airway pressure, respiratory therapy assess nares and determine need for appliance change or resting period.   Outcome: Progressing

## 2023-03-02 NOTE — PROGRESS NOTES
OCCUPATIONAL THERAPY BEDSIDE TREATMENT NOTE   [facility] [employer] [facility]  Eleanor Edwards. [place]    Date:3/2/2023  Patient Name: Nilam Feliciano"  MRN: [id number]  : 1950  Room: [address] [place] [address]       Evaluating OT: Glynn Nearing OTR/L #[id number]      Referring Provider and Specific Provider Orders/Date:      23   OT eval and treat  Start:  23,   End:  23,   ONE TIME,   Standing Count:  1 Occurrences,   R         PAULETTE Celestin DO       Placement Recommendation: Acute Rehab         Diagnosis:   1. Fall, initial encounter    2. Left displaced femoral neck fracture ([address]    3.  S/p left hip fracture         Surgery: S/p LEFT HIP HEMIARTHROPLASTY 3/1/23 by Dr. Abdi Celestin       Pertinent Medical History:       Past Medical History        Past Medical History:   Diagnosis Date    Parkinson's disease [facility])              Past Surgical History         Past Surgical History:   Procedure Laterality Date    DEEP BRAIN STIMULATOR PLACEMENT         L chest    FRACTURE SURGERY         nose    JOINT REPLACEMENT Right 2017     knee    KNEE ARTHROSCOPY Right      TONSILLECTOMY        TOTAL KNEE ARTHROPLASTY Left 2020     TOTAL LEFT KNEE REPLACEMENT/ ARTHROPLASTY (JOLENE) performed by Ayanna Red DO at [facility] OR         Precautions:  Fall Risk, alarm, full weight bearing as tolerated, activity beginning POD #1, hx of Parkinson's disease and deep brain stimulator placement (left chest), posterior hip precautions       Assessment of current deficits    [x] Functional mobility            [x]ADLs           [x] Strength                   [x]Cognition    [x] Functional transfers          [x] IADLs          [x] Safety Awareness   [x]Endurance    [x] Fine Coordination              [x] Balance      [] Vision/perception    []Sensation      [x]Gross Motor Coordination  [x] ROM           [] Delirium                   [x] Motor Control OT PLAN OF CARE   OT POC based on physician orders, patient diagnosis and results of clinical assessment     Frequency/Duration 2-5 days/wk for 2 weeks BID PRN      Specific OT Treatment Interventions to include:   * Instruction/training on adapted ADL techniques and AE recommendations to increase functional independence within precautions       * Training on energy conservation strategies, correct breathing pattern and techniques to improve independence/tolerance for self-care routine  * Functional transfer/mobility training/DME recommendations for increased independence, safety, and fall prevention  * Patient/Family education to increase follow through with safety techniques and functional independence  * Recommendation of environmental modifications for increased safety with functional transfers/mobility and ADLs  * Cognitive retraining/development of therapeutic activities to improve problem solving, judgement, memory, and attention for increased safety/participation in ADL/IADL tasks  * Therapeutic exercise to improve motor endurance, ROM, and functional strength for ADLs/functional transfers  * Therapeutic activities to facilitate/challenge dynamic balance, stand tolerance for increased safety and independence with ADLs  * Therapeutic activities to facilitate gross/fine motor skills for increased independence with ADLs  * Neuro-muscular re-education: facilitation of righting/equilibrium reactions, midline orientation, scapular stability/mobility, normalization of muscle tone, and facilitation of volitional active controled movement  * Positioning to improve skin integrity, interaction with environment and functional independence     Recommended Adaptive Equipment: TBD       Home Living: Lives with wife, single family home, 1 story, 1 step to enter.    Bathroom set-up: Walk-in shower          Equipment owned: standard walker, cane, shower chair      Prior Level of Function: Wife assists with ADLs as needed, ambulated independently without device. Pain Level: No signs/reports of pain; Nursing notified. Cognition: A&O: 2/4; Follows 1-2 step directions              Memory: impaired               Sequencing: impaired               Problem solving: impaired               Judgement/safety: impaired      [facility]   AM-PAC Daily Activity - Inpatient   How much help is needed for putting on and taking off regular lower body clothing?: Total  How much help is needed for bathing (which includes washing, rinsing, drying)?: A Lot  How much help is needed for toileting (which includes using toilet, bedpan, or urinal)?: Total  How much help is needed for putting on and taking off regular upper body clothing?: A Lot  How much help is needed for taking care of personal grooming?: A Lot  How much help for eating meals?: A Lot  [facility]: 10  AM-PAC Inpatient ADL T-Scale Score : 27.31  ADL Inpatient CMS 0-100% Score: 74.7  ADL Inpatient CMS G-Code Modifier : CL                Functional Assessment:     Initial Eval Status  Date: 3/1/23    Treatment Status  Date:3/2/23 STGs = LTGs  Time frame: 10-14 days   Feeding Moderate Assist   Patient positioned upright in bed with all items within reach to improve function and safety with feeding. Patient requiring assist to retrieve food from tray with utensil and bring utensil to mouth with hand over hand assist.   Built-up foam piece provided to improve grasp on utensil.      Mod A with set up assist;wife assisting pt in feeding   Supervision    Grooming Maximal Assist     N/T - wife assisted pt in grooming tasks prior to session  Supervision    UB Dressing Maximal Assist    Max A to tie/adjust back of gown when seated EOB  Supervision    LB Dressing Dependent   Assist to lift heels off bed to don/doff socks at bed level     N/T; socks donned prior to session, however anticipate pt will require max A/Dep for LE dressing 2* to hip precautions Minimal Assist    Bathing Maximal Assist      N/T - wife assisted pt prior to session Minimal Assist    Toileting Dependent   Patient has olmstead catheter      Dep - olmstead catheter Moderate Assist    Bed Mobility  Supine to sit: Maximal Assist x 1   Sit to supine: Maximal Assist x 2      Max A for supine to sit with assist to guide LE's and UB to sitting; scooting at mod A for upright position as pt used B hands to scoot to EOB; sit to supine at max A x 2 with assist to support UB and LE's to supine; assist for positioning of LLE in neutral position; max A x 2 to scoot to [facility] with use of TAPS as sling; HOB elevated at end of session for lunch; alarm on; all needs within reach Supine to sit: Minimal Assist   Sit to supine: Minimal Assist    Functional Transfers NT due to pt overall debility, decreased activity tolerance, balance deficits, safety and fall risk. SIt to stand to/from EOB x 2 reps with max A x 2 with use of chux pad as sling and use of FWW; posterior lean on standing with narrow JUNAID.  Cuing and assist to increase JUNAID and foot placement/hand placement; pt demo's posterior and L lateral lean with standing; cuing and assist for upright position Moderate Assist    Functional Mobility Not Assessed     Attempted to side step, however unable to on this date  Moderate Assist with use of wheeled walker       Balance Sitting:     Static: Mod/Max A initially with strong posterior lean progressing to CGA-Min A with patient utilizing bed rails to improve balance/posture     Dynamic: poor    Standing: n/a     Sitting:     Static: mod A progressing to min A 2* to intermittent posterior and L lateral lean;  patient utilizing bed rails to improve balance/posture     Dynamic: fair minus/poor   Standing: poor with max A x 2 with FWW; posterior lean with knee flexion and narrow JUNAID noted Sitting:     Static: fair plus     Dynamic: fair plus  Standing: fair with walker    Activity Tolerance fair  ; sitting tolerance at EOB up to x 10 mins  Fair; pt willing to participate in therapy  Increase standing tolerance >1-2 minutes for improved engagement with functional transfers and indep in ADLs      Visual/  Perceptual Glasses: Yes, not present at time of eval      Impaired scanning and saccades noted during vision screen sitting at EOB      NA       Hand Dominance: Right        AROM (PROM) Strength Additional Info:  Goal:   RUE  AAROM WFL  2+/5 Fair minus  and poor FMC/dexterity noted during ADL tasks    Improve overall RUE strength  for participation in functional tasks   LUE AAROM [facility] [place] 2+/5 Fair minus  and poor FMC/dexterity noted during ADL tasks    Improve overall LUE strength  for participation in functional tasks    - BUE AAROM exercises: 10-15 reps in all planes of movement to increase ROM/endurance/strength required for functional transfers/ADL participation. Exercises completed in shoulder and elbow flexion/extension, internal/external rotation, abduction/adduction, supination/pronation and wrist flexion/extension. B UE ROM appears to be [facility] with assist; pt required assist to initiate movement, however able to complete task with AAROM. Ex's started when pt seated EOB and completed on RTB. Min rest breaks provided 2* to decreased endurance. Mod AAssist required for sitting balance at EOB d/t posterior and L lateral lean. Hearing:  WFL   Sensation:   No c/o numbness or tingling  Tone:  UB/LB impaired 2/2 PD, hypertonic throughout. Edema: None          Comments: Patient cleared by nursing staff. Upon arrival pt seated in bed with HOB elevated. Wife present. Pt agreeable to OT tx session with partial cotreat with PT d/t level of physical assistance needed and pt's decreased endurance level and for safety of pt and staff. Pt educated with regards to bed mobility, hand placement, safety awareness, static sitting balance,  standing balance, sit to stand transfer training, B UE ROM ex's,  ECT's.   At end of session pt seated in bed to eat lunch with wife assisting pt as needed; all lines and tubes intact, call light within reach. Overall, pt demonstrated decreased independence and safety during completion of ADL/functional transfers/mobility tasks. Pt would benefit from continued skilled OT to increase safety and independence with completion of ADL/IADL tasks for functional independence and quality of life. Pt required cues and education as noted above for safe facilitation and completion of tasks. Therapist provided skilled monitoring of patient's response during treatment session. Prior to and at the end of session, environmental modifications / line management completed for patients safety and efficiency of treatment session. Overall, patient demonstrates moderate difficulties with completion of BADLs and IADLs. Factors contributing to these difficulties include parkinson's disease, decreased endurance, and generalized weakness. As noted above, patient likely to benefit from further OT intervention to increase independence, safety, and overall quality of life. Treatment:     Bed mobility: Facilitated bed mobility with cues for proper body mechanics and sequencing to prepare for ADL completion. Functional transfers: Facilitated transfers to/from EOB x 2 reps with cues for body alignment, safety and hand placement. Postural Balance: Sitting/standing balance retraining to improve righting reactions with postural changes during ADLs. Skilled positioning: Proper positioning to improve interaction with environment, overall functioning and decrease/prevent edema and contractures. Therapeutic Ex's: To increase B UE strength/ROM and endurance required for functional transfers and ADL participation.      Pt has made fair progress towards set goals     OT 1-3 days/wk for 2 weeks PRN     Treatment Time also includes thorough review of current medical information, gathering information on past medical history/social history and prior level of function, informal observation of tasks, assessment of data and education on plan of care and goals.     Treatment Time In: 11:12 AM    Treatment Time Out: 11:37 AM          Treatment Charges: Mins Units   ADL/Home t     [address] [id number] [phone]   Ther Ex                 [id number] 1   Manual Therapy    [id number]     Neuro Re-ed         21256     Orthotic manage/training                               60371     Non Billable Time 2    Total Timed Treatment 25-2=23 2        MICHAEL Epstein/GENI #[id number]

## 2023-03-02 NOTE — PROGRESS NOTES
Physical Therapy    Physical Therapy Treatment Note/Plan of Care    Room #:  1948/0138-71  Patient Name: Afshan Goldberg  YOB: 1950  MRN: [id number]    Date of Service: 3/2/2023     Tentative placement recommendation: Acute Rehab  Equipment recommendation: To be determined      Evaluating Physical Therapist: Sana Zaidi  #[id number]      Specific Provider Orders/Date/Referring Provider :  02/28/23 1915    PT eval and treat  Start:  02/28/23 1915,   End:  02/28/23 1915,   ONE TIME,   Standing Count:  1 Occurrences,   R         PAULETTE Adair DO     Admitting Diagnosis:   Left displaced femoral neck fracture ([facility] Miguelit[facility] Evangelina  Fall, initial encounter [W19. XXXA]  Intertrochanteric fracture of left femur, closed, initial encounter [facility]) [I36.052S]       Surgery: s/p left hit hemiarthroplasty 2/28/23 dr Viky Cervantes  Visit Diagnoses         Codes    Fall, initial encounter    -  Primary W19. XXXA    Left displaced femoral neck fracture ([facility]     S72.002A    S/p left hip fracture     Z87.81            Patient Active Problem List   Diagnosis    S/P knee replacement    Parkinsonism ([facility]    Arthritis of left knee    Intertrochanteric fracture of left femur, closed, initial encounter ([facility]    Fall        ASSESSMENT of Current Deficits Patient exhibits decreased strength, balance, endurance, range of motion, coordination, and pain left hip  impairing functional mobility, transfers, gait , gait distance, and tolerance to activity Skilled intervention of 2 clinicians required to facilitate participation, promote active engagement to progress towards goals and prevent injury of patient and staff. Pt able to progress function today with mod assist assist x2 to stand and max assist x2 for LE placement once standing and increasing base of support.        PHYSICAL THERAPY  PLAN OF CARE       Physical therapy plan of care is established based on physician order,  patient diagnosis and clinical assessment    Current Treatment Recommendations:    -Bed Mobility: Lower extremity exercises , Upper extremity exercises , and Trunk control activities   -Sitting Balance: Incorporate reaching activities to activate trunk muscles , Hands on support to maintain midline , and Facilitate postural control in all planes   -Standing Balance: Perform strengthening exercises in standing to promote motor control with or without upper extremity support  and Instruct patient on adequate base of support to maintain balance  -Transfers: Provide instruction on proper hand and foot position for adequate transfer of weight onto lower extremities and use of gait device if needed and Cues for hand placement, technique and safety. Provide stabilization to prevent fall   -Endurance: Utilize Supervised activities to increase level of endurance to allow for safe functional mobility including transfers and gait     PT long term treatment goals are located in below grid    Patient and or family understand(s) diagnosis, prognosis, and plan of care. Frequency of treatments: Patient will be seen  twice daily.          Prior Level of Function: Patient ambulated independently with occasional falls  Rehab Potential: good   for baseline    Past medical history:   Past Medical History:   Diagnosis Date    Parkinson's disease [facility])      Past Surgical History:   Procedure Laterality Date    DEEP BRAIN STIMULATOR PLACEMENT      L chest    FRACTURE SURGERY      nose    HIP SURGERY Left 2/28/2023    LEFT HIP HEMIARTHROPLASTY (DEPUY) performed by Fabrice Abdi DO at [address] Right 05/16/2017    knee    KNEE ARTHROSCOPY Right     TONSILLECTOMY      TOTAL KNEE ARTHROPLASTY Left 9/1/2020    TOTAL LEFT KNEE REPLACEMENT/ ARTHROPLASTY (JOLENE) [facility]    Precautions: Ambulate patient  and incentive spirometer, falls, alarm, O2, and left full weight bearing    , brain stimulator for tremors, parkinson's, hip precaution  Goes by [employer]"  Social history: Patient lives with spouse in a ranch home  with 1 step  to enter home. Walk in shower        Equipment owned: Petty Segovia, and Tub transfer bench,  grab bars    AM-PAC Basic Mobility        AM-PAC Basic Mobility - Inpatient   How much help is needed turning from your back to your side while in a flat bed without using bedrails?: A Lot  How much help is needed moving from lying on your back to sitting on the side of a flat bed without using bedrails?: A Lot  How much help is needed moving to and from a bed to a chair?: A Lot  How much help is needed standing up from a chair using your arms?: A Lot  How much help is needed walking in hospital room?: Total  How much help is needed climbing 3-5 steps with a railing?: Total  AM-PAC Inpatient Mobility Raw Score : 10  AM-PAC Inpatient T-Scale Score : 32.29  Mobility Inpatient CMS 0-100% Score: 76.75  Mobility Inpatient CMS G-Code Modifier : CL    Nursing cleared patient for PT treatment. OBJECTIVE;   Initial Evaluation  Date: 3/1/2023 Treatment Date:    3/2/2023   Short Term/ Long Term   Goals   Was pt agreeable to Eval/treatment? Yes Yes To be met in 5 days   Pain level   5/10  left hip No number given, left hip     Bed Mobility    Rolling: Maximal assist of  2    Supine to sit: Maximal assist of 1    Sit to supine: Dependent of  2    Scooting: Maximal assist of 1   Rolling: Not assessed patient seated edge of bed   Supine to sit: Not assessed patient seated edge of bed   Sit to supine: Maximal assist of  2   Scooting: Maximal assist of 1    Rolling: Moderate assist of 1    Supine to sit: Moderate assist of 1    Sit to supine: Moderate assist of 1    Scooting: Moderate assist of 1     Transfers Sit to stand: Not assessed due to pt overall debility, decreased activity tolerance, balance deficits, safety and fall risk. Sit to stand: Moderate assist of  2 cues for hand/foot placement.  LE were blocked forward and from the center to avoid hip IR and narrow base of support. PT presented with posterior lean while standing and trying to move his feet forward but not his trunk. Required max assist x2 for foot placement and attempting to take side steps to [facility]. Pt difficulty maintaining a functional base of support and maintaining feet under shoulders and trunk. Sit to stand:  Moderate assist of  2 to steady    ROM Within functional limits  with except left hip with in precautions; difficulty with left hip extension d/t pain and rigidity  Increase range of motion 10% of affected joints    Strength BUE:  refer to OT eval  RLE:  3/5  LLE:  2/5  Increase strength in affected mm groups by 1/3 grade   Balance Sitting EOB:  poor 2 person assist progressing to intermittent min a of 1  Dynamic Standing:  not assessed   Sitting EOB: fair -, min A for trunk due to posterior lean   Dynamic Standing: poor    Sitting EOB:  fair sit edge of bed 5 min supervision   Dynamic Standing: fair with steday     Patient is Alert & Oriented x person, place, and situation and follows one step directions    Sensation:  Patient  denies numbness/tingling   Edema:  yes left lower extremity   Endurance: poor tolerance to sitting d/t pain and overall debility         Patient education  Patient educated on role of Physical Therapy, risks of immobility, safety and plan of care, importance of positional changes for oxygen exchange,  importance of mobility while in hospital , ankle pumps, quad set and glut set for edema control, blood clot prevention, safety , weight bearing status , left knee extension in bed and during stance phase of gait, and positioning for skin integrity and comfort     Patient response to education:   Pt verbalized understanding Pt demonstrated skill Pt requires further education in this area   No No Yes    Wife with good understanding  Treatment:  Patient practiced and was instructed/facilitated in the following treatment: Patient performed exercises. PT left room and re-entered to assist RODRIGUEZ with function. Pt was sitting EOB with RODRIGUEZ. Sat edge of bed 5 minutes with moderate assist progressing to min assist to increase dynamic sitting balance and activity tolerance. Pt stood x 2 reps. Assisted back to supine and to [facility]. Therapeutic Exercises:  ankle pumps, quad sets, glut sets, heel slide, hip abduction/adduction, and shoulder elevation  x 10 reps. A/a    At end of session, patient in bed with spouse present call light and phone within reach,  all lines and tubes intact, nursing notified. Patient would benefit from continued skilled Physical Therapy to improve functional independence and quality of life.          Patient's/ family goals   rehab    Time in  0950          1116  Time out  1011         1127      Total Treatment Time  32 minutes    CPT codes:  Therapeutic activities (61658)   13 minutes  1 unit(s)  Therapeutic exercises (16031)   10 minutes  1 unit(s)  Non billable time 9 minutes working with 200  [facility]

## 2023-03-02 NOTE — DISCHARGE SUMMARY
[facility] Physician Discharge Summary       Ayala Randall MD  [address] [facility] [address] [phone]    Follow up on 4/4/2023  New PCP appt with Dr. Adalberto Sorenson on Tue April 4th at 8:15 AM. Please bring insurance cards, any medications and copy of photo ID. Activity level: Slowly increase as tolerated    Diet: ADULT DIET; Regular    Labs: None are pending at the discharge    Condition at discharge: Stable    Dispo: Return to home setting     Patient ID:  Velvet Quiroga  [id number]  81 y.o.  1950    Admit date: 2/27/2023    Discharge date and time:  3/2/2023  9:23 AM    Admission Diagnoses: Principal Problem:    Intertrochanteric fracture of left femur, closed, initial encounter [facility])  Active Problems:    Fall    Parkinsonism ([facility] [address]    Arthritis of left knee  Resolved Problems:    * No resolved hospital problems. *      Discharge Diagnoses: Principal Problem:    Intertrochanteric fracture of left femur, closed, initial encounter [facility])  Active Problems:    Fall    Parkinsonism ([facility] [address]    Arthritis of left knee  Resolved Problems:    * No resolved hospital problems. *      Consults:  IP CONSULT TO ORTHOPEDIC SURGERY  IP CONSULT TO SOCIAL WORK  IP CONSULT TO SOCIAL WORK    Procedures: None significant except if described in hospital course. Hospital Course: History of present illness from History and Physical:  Jaron Stewart is a   81 y.o. male with a history of parkinson disease, who presents with fall while home. Patient's wife [facility] states that last night patient fell multiple times but denies hitting his head. Patient is complaining of left hip pain which is worse when he tries to stand. Patient prior this recent fall had not fell in a while per wife. Patient is able to answer some of the questions but he mumbles at times and is hard to hear him. Patient does not smoke, use tobacco or alcohol. Patient lives with his wife. Patient has a deep brain stimulator (DBS) on the left upper chest area. Patient is unable to be defibrillated because of the DBS per wife. Left Intertochanteric left femur fracture - S/p left hemiarthroplasty - pain medication prn - orthopedic surgery on - PT/OT -  dressing dry and intact      History of Parkinson disease;  Baseline jerky like movement noted. H/o Deep[ brain stimulator. unsteady gait - history of multiple falls. follows commands. on Carbidopa-levodopa f/u as outpatient     Falls precaution bedside 1:1 sitter at bedside - falls precaution maintained - PT/OT following - continue monitor       Left knee arthritis - no c/o pain but not walking - takes Tylenol or Advil - left knee xray shows left total knee prothesis in anatomic alignment. No acute fractures or prosthetic complications. There is diffuse anterior soft tissue swelling. No acute bony abnormalities or prosthetic complications         Old CVA - per CT scan of head bilateral thalamic nuclei neural stimulators without evidence of complication. 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 No acute intracranial hemorrhage - uses cane. Restart aspirin     Degenerative changes of cervical spine - no c/o pain - xray demonstrated severe degenerative changes. Chronic grade 1 anterolisthesis of C2 on C3 and C3 on C4. F/u: Each day I see him and wife I review that he needs regular pcp f/u. He agrees. Wife defers to him. Case management said wife seems resistant to considering secure facility where he may be placed since he might need closer supervision. I went back to the room with case management to talk to the wife in a calm supportive manner. Wife explains how she works full-time at [employer] she has children in [place] and [place] it seems like she locally does not have enough help and support. Therefore later I suggested to case management for them to give her resources for respite care when he is out of the facility. However after discussion she is willing to consider a secured unit for her . I became quite concerned when in her discussing of her case after my recommendations to see PCP she explained that her  is healthy yet as we continue to talk she does realize that he is confused and not capable of really making his own medical decisions right now. I explained that I would not want to contradict her but only to help her and therefore I suggested that she could consider taking him to the doctor at the [facility] for PCP or locally. She explained how the Parkinson's doctor at the [facility] is very thorough and even checks his blood pressure therefore since they check so many things she reasons that he would not need a PCP. I have asked the  to help discuss the aspect of the neurologist checking blood pressure she explained how this is a normal routine and any office setting. I agreed. I also agreed with the wife about the excellent world class care that he receives at the [facility] neurology. I recommended that she could ask the [facility] DrSusan If there really would be any other reason to make it useful for him to go see a primary care doctor's considering what they cover. She then began to see my point and agreed to think more about this. Using I again reiterated as I had done several times that I am here to help and asked her to please think of anything more that we could do or I could say in order to help her with her situation.   She agreed to think about that is well      Discharge Exam:  Vitals:    03/01/23 0956 03/01/23 1545 03/01/23 2015 03/02/23 0644   BP:  108/60 122/76 (!) 140/83   Pulse:  86 93 92   Resp: 18 18 18 18   Temp:  98.7 °F (37.1 °C) 98.2 °F (36.8 °C) 98.7 °F (37.1 °C)   TempSrc:  Oral Oral    SpO2:  96% 93% 93%   Weight:       Height:           No acute distress moist membranes   Normocephalic, without obvious abnormality, atraumatic, external ears without lesions,   Neck supple no cervical lymphadenopathy  Heart has a regular rate and rhythm no murmur  Lungs are clear to auscultation bilaterally with equal movements. Abdsignificant peripheral edema good peripheral perfusion. No significant rashes or new skin lesions. No new focality on neuro exam which is overall grossly intact. Affect and mood seem to be appropriate     I/O last 3 completed shifts: In: 120 [P.O.:120]  Out: 920 [Urine:920]  No intake/output data recorded. LABS:  Recent Labs     02/27/23 1819 02/28/23  0701 03/01/23  1709    140 140   K 4.2 4.1 4.1    105 103   CO2 25 22 26   BUN 15 19 34*   CREATININE 0.8 0.8 1.1   GLUCOSE 125* 111* 154*   CALCIUM 8.9 8.8 9.0       Recent Labs     02/27/23 1819 02/28/23  0701 03/01/23  1709   WBC 11.8* 9.9 10.6   RBC 4.30 4.17 3.94   HGB 12.8 12.7 11.6*   HCT 38.2 37.0 36.8*   MCV 88.8 88.7 93.4   MCH 29.8 30.5 29.4   MCHC 33.5 34.3 31.5*   RDW 12.1 12.4 12.4    264 322   MPV 10.2 10.5 10.6       No results for input(s): POCGLU in the last 72 hours. Imaging:  XR HIP LEFT (2-3 VIEWS)    Result Date: 2/28/2023  EXAMINATION: TWO XRAY VIEWS OF THE LEFT HIP 2/28/2023 2:58 pm COMPARISON: 02/27/2023 HISTORY: ORDERING SYSTEM PROVIDED HISTORY: lora TECHNOLOGIST PROVIDED HISTORY: Of operative side while in recovery room. Reason for exam:->lora FINDINGS: There has been interval left hip hemiarthroplasty with adequate alignment. Expected postsurgical changes of the soft tissues are noted including skin stables. Status post left hip hemiarthroplasty. XR HIP LEFT (2-3 VIEWS)    Result Date: 2/27/2023  EXAMINATION: TWO XRAY VIEWS OF THE LEFT HIP 2/27/2023 1:49 pm COMPARISON: None. HISTORY: ORDERING SYSTEM PROVIDED HISTORY: fall TECHNOLOGIST PROVIDED HISTORY: Reason for exam:->fall FINDINGS: There is femoral neck fracture with varus angulation and adjacent soft tissue swelling. Left femoral neck fracture.      XR KNEE LEFT (1-2 VIEWS)    Result Date: 2/27/2023  EXAMINATION: TWO XRAY VIEWS OF THE LEFT KNEE 2/27/2023 1:49 pm COMPARISON: None. HISTORY: ORDERING SYSTEM PROVIDED HISTORY: fall, pain TECHNOLOGIST PROVIDED HISTORY: Reason for exam:->fall, pain FINDINGS: Total knee prosthesis in anatomic alignment. No acute fractures or prosthetic complications. There is diffuse anterior soft tissue swelling. No acute bony abnormalities or prosthetic complications. CT HEAD WO CONTRAST    Result Date: 2/27/2023  EXAMINATION: CT OF THE HEAD WITHOUT CONTRAST  2/27/2023 2:21 pm TECHNIQUE: CT of the head was performed without the administration of intravenous contrast. Automated exposure control, iterative reconstruction, and/or weight based adjustment of the mA/kV was utilized to reduce the radiation dose to as low as reasonably achievable. COMPARISON: None. HISTORY: ORDERING SYSTEM PROVIDED HISTORY: fall, headache dizziness TECHNOLOGIST PROVIDED HISTORY: Reason for exam:->fall, headache dizziness Has a \"code stroke\" or \"stroke alert\" been called? ->No Decision Support Exception - unselect if not a suspected or confirmed emergency medical condition->Emergency Medical Condition (MA) FINDINGS: BRAIN/VENTRICLES: There is no acute intracranial hemorrhage, mass effect or midline shift. No abnormal extra-axial fluid collection. The gray-white differentiation is maintained without evidence of an acute infarct. There is no evidence of hydrocephalus. Bilateral neurostimulator devices are noted extending into the anterior thalamus. No evidence of complication. Mild age related cortical atrophy bilaterally. 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 ORBITS: The visualized portion of the orbits demonstrate no acute abnormality. SINUSES: The visualized paranasal sinuses and mastoid air cells demonstrate no acute abnormality. SOFT TISSUES/SKULL:  No acute abnormality of the visualized skull or soft tissues.      1.  Bilateral thalamic nuclei neural stimulators without evidence of complication. 2.  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 3.  No acute intracranial hemorrhage. CT CERVICAL SPINE WO CONTRAST    Result Date: 2/27/2023  EXAMINATION: CT OF THE CERVICAL SPINE WITHOUT CONTRAST 2/27/2023 2:21 pm TECHNIQUE: CT of the cervical spine was performed without the administration of intravenous contrast. Multiplanar reformatted images are provided for review. Automated exposure control, iterative reconstruction, and/or weight based adjustment of the mA/kV was utilized to reduce the radiation dose to as low as reasonably achievable. COMPARISON: None. HISTORY: ORDERING SYSTEM PROVIDED HISTORY: neck pain, r/o fx TECHNOLOGIST PROVIDED HISTORY: Reason for exam:->neck pain, r/o fx FINDINGS: BONES/ALIGNMENT: The craniocervical and atlantoaxial junctions are intact. The odontoid process is intact. Grade 1 anterolisthesis of C2 on C3 and C3 on C4 likely chronic. Mild reversal of the cervical lordosis. Normal alignment is otherwise maintained. The vertebral body heights are preserved. No fracture or other acute osseous abnormality identified. DEGENERATIVE CHANGES: Severe degenerative disc disease from C3-4 through C6-7. Multilevel bilateral facet arthropathy severe on the right at C2-3 and C3-4. SOFT TISSUES: There is no prevertebral soft tissue swelling. 1. No acute cervical spine abnormality. 2. Severe degenerative changes. Chronic grade 1 anterolisthesis of C2 on C3 and C3 on C4. XR CHEST 1 VIEW    Result Date: 2/27/2023  EXAMINATION: ONE XRAY VIEW OF THE CHEST 2/27/2023 1:49 pm COMPARISON: None. HISTORY: ORDERING SYSTEM PROVIDED HISTORY: fall TECHNOLOGIST PROVIDED HISTORY: Reason for exam:->fall FINDINGS: The lungs are without acute focal process. There is no effusion or pneumothorax. The cardiomediastinal silhouette is without acute process. The osseous structures are without acute process.      No acute process. Patient Instructions:   Current Discharge Medication List        CONTINUE these medications which have CHANGED    Details   sertraline (ZOLOFT) 50 MG tablet Take 1 tablet by mouth daily  Qty: 30 tablet, Refills: 0           CONTINUE these medications which have NOT CHANGED    Details   acetaminophen (TYLENOL) 500 MG tablet Take 1,000 mg by mouth every 6 hours as needed for Pain      carbidopa-levodopa (SINEMET)  MG per tablet Take 2 tablets by mouth 4 times daily 0800 1200 1600 2000      loratadine (CLARITIN) 10 MG tablet Take 10 mg by mouth daily as needed (Allergies)           STOP taking these medications       aspirin 325 MG EC tablet Comments:   Reason for Stopping:         ibuprofen (ADVIL;MOTRIN) 200 MG tablet Comments:   Reason for Stopping:                 Note that more than 30 minutes was spent in preparing discharge papers, discussing discharge with patient, medication review, etc.    NOTE: This report was transcribed using voice recognition software. Every effort was made to ensure accuracy; however, inadvertent computerized transcription errors may be present.      Signed:  Electronically signed by Nichole Langford MD on 3/2/2023 at 9:23 AM

## 2023-03-02 NOTE — CARE COORDINATION
Marlene Arenas from [facility] is following, she would like to see how patient does today with therapy (requested they work with him this am) to determine if they are able to accept. Spoke with Diaz Loredo at [facility], they accepted if [facility] can not take, however do not have a bed until tomorrow morning if plan is [facility]. Awaiting therapy update and possible acceptance to [facility]. Have requested TAWNYA covid in the meantime for possible DC.

## 2023-03-02 NOTE — DISCHARGE INSTR - COC
Continuity of Care Form    Patient Name: Meredith Flores   :  1950  MRN:  [id number]    Admit date:  2023  Discharge date:  3/2/2023    Code Status Order: Limited   Advance Directives:   [facility] Documentation       Date/Time Healthcare Directive Type of Healthcare Directive Copy in [address] Agent's Name Healthcare Agent's Phone Number    [phone] Yes, patient has an advance directive for healthcare treatment Durable power of  for health care No, copy requested from family Spouse -- --            Admitting Physician:  Davie Gonzalez MD  PCP: None Provider    Discharging Nurse: [facility] Unit/Room#: 1755/3880-02  Discharging Unit Phone Number: [phone]    Emergency Contact:   Extended Emergency Contact Information  Primary Emergency Contact: Emily Elizabeth  Address: [address] [facility], [address] [place] [address] [place] of [address] Phone: [phone]  Work Phone: [phone]  Mobile Phone: [phone]  Relation: Spouse    Past Surgical History:  Past Surgical History:   Procedure Laterality Date    DEEP BRAIN STIMULATOR PLACEMENT      L chest    FRACTURE SURGERY      nose    HIP SURGERY Left 2023    LEFT HIP HEMIARTHROPLASTY (DEPUY) performed by Claudia Finnegan DO at [address] [facility] 2017    knee    KNEE ARTHROSCOPY Right     TONSILLECTOMY      TOTAL KNEE ARTHROPLASTY Left 2020    TOTAL LEFT KNEE REPLACEMENT/ ARTHROPLASTY (JOLENE) performed by Claudia Finnegan DO at [address]       Immunization History: There is no immunization history on file for this patient. Active Problems:  Patient Active Problem List   Diagnosis Code    S/P knee replacement Z96.659    Parkinsonism ([facility] [address] G20    Arthritis of left knee M17.12    Intertrochanteric fracture of left femur, closed, initial encounter [facility]) S72.142A    Fall W19. King Stable       Isolation/Infection:   Isolation            No Isolation          Patient Infection Status       Infection Onset Added Last Indicated Last Indicated By Review Planned Expiration Resolved Resolved By    None active    Resolved    COVID-19 (Rule Out) 03/02/23 03/02/23 03/02/23 COVID-19 & Influenza Combo (Ordered)   03/02/23 Rule-Out Test Resulted    COVID-19 (Rule Out) 08/27/20 08/27/20 08/27/20 Covid-19 Ambulatory (Ordered)   08/29/20 Rule-Out Test Resulted            Nurse Assessment:  Last Vital Signs: BP (!) 140/83   Pulse 92   Temp 98.7 °F (37.1 °C)   Resp 18   Ht 5' 9\" (1.753 m)   Wt 150 lb (68 kg)   SpO2 93%   BMI 22.15 kg/m²     Last documented pain score (0-10 scale): Pain Level: 3  Last Weight:   Wt Readings from Last 1 Encounters:   02/27/23 150 lb (68 kg)     Mental Status:  disoriented, oriented, and increased confusion at night    IV Access:  - None    Nursing Mobility/ADLs:  Walking   Dependent  Transfer  Dependent  Bathing  Dependent  Dressing  Dependent  Toileting  Dependent  Feeding  Dependent  Med Admin  Dependent  Med Delivery   whole    Wound Care Documentation and Therapy:  Incision 05/16/17 Knee Right (Active)   Number of days: 2116       Incision 02/28/23 Thigh Anterior;Left;Lateral (Active)   Dressing Status New dressing applied 03/02/23 0900   Dressing/Treatment Non-adherent 03/02/23 0900   Closure Staples 03/02/23 0900   Margins Approximated 03/02/23 0900   Incision Assessment Dry 03/02/23 0900   Drainage Amount Scant 03/02/23 0900   Drainage Description Sanguinous 03/02/23 0900   Kylie-incision Assessment Edematous; Ecchymosis 03/02/23 0900   Number of days: 2       Incision 09/01/20 Knee Anterior; Left (Active)   Number of days: 912        Elimination:  Continence:    Bowel: No  Bladder: No  Urinary Catheter: None and Removal Date 3/1/2023    Colostomy/Ileostomy/Ileal Conduit: No       Date of Last BM: 3/2/2023    Intake/Output Summary (Last 24 hours) at 3/2/2023 1433  Last data filed at 3/2/2023 1132  Gross per 24 hour   Intake 900 ml   Output 720 ml   Net 180 ml     I/O last 3 completed shifts: In: 120 [P.O.:120]  Out: 920 [Urine:920]    Safety Concerns:     Sundowners [place]rome, History of Falls (last 30 days), and At Risk for Falls    Impairments/Disabilities:      None    Nutrition Therapy:  Current Nutrition Therapy:   - Oral Diet:  General    Routes of Feeding: Oral  Liquids: No Restrictions  Daily Fluid Restriction: no  Last Modified Barium Swallow with Video (Video Swallowing Test): not done    Treatments at the Time of Hospital Discharge:   Respiratory Treatments: none  Oxygen Therapy:  is not on home oxygen therapy.   Ventilator:    - No ventilator support    Rehab Therapies: Physical Therapy and Occupational Therapy  Weight Bearing Status/Restrictions: No weight bearing restrictions WBAT to left leg  Other Medical Equipment (for information only, NOT a DME order):  walker  Other Treatments: none    Patient's personal belongings (please select all that are sent with patient):  None    RN SIGNATURE:  Electronically signed by Slim Oneill RN on 3/2/23 at 2:37 PM EST    CASE MANAGEMENT/SOCIAL WORK SECTION    Inpatient Status Date: ***    Readmission Risk Assessment Score:  Readmission Risk              Risk of Unplanned Readmission:  11           Discharging to Facility/ Agency   Name:   Address:  Phone:  Fax:    Dialysis Facility (if applicable)   Name:  Address:  Dialysis Schedule:  Phone:  Fax:    / signature: {Esignature:[id number]}    PHYSICIAN SECTION    Prognosis: {Prognosis:7115409926}    Condition at Discharge: 50Angela Villavicencio Patient Condition:826824265}    Rehab Potential (if transferring to Rehab): {Prognosis:4255551064}    Recommended Labs or Other Treatments After Discharge: ***    Physician Certification: I certify the above information and transfer of Marilyn Cardozo  is necessary for the continuing treatment of the diagnosis listed and that he requires {Admit to Appropriate Level of Care:09024} for {GREATER/LESS:169128808} 30 days.      Update Admission H&P: [facility]    PHYSICIAN SIGNATURE:  Lennox Rocks, MD

## 2023-03-02 NOTE — PROGRESS NOTES
Department of Orthopedic Surgery  Resident Progress Note    POD2 left hip hemiarthroplasty. Patient seen and examined. Patient states he is having some pain over the in incision site left lateral hip. . No new complaints. Denies chest pain, shortness of breath, dizziness/lightheadedness.      VITALS:  /76   Pulse 93   Temp 98.2 °F (36.8 °C) (Oral)   Resp 18   Ht 5' 9\" (1.753 m)   Wt 150 lb (68 kg)   SpO2 93%   BMI 22.15 kg/m²     General: Patient is awake and alert at bedside    MUSCULOSKELETAL:   left lower extremity:  Dressing C/D/I, approximately a 1 cm very small area of strikethrough noted at the proximal and distal areas of the Aquacel  Compartments soft and compressible  +PF/DF/EHL  +2/4 DP & PT pulses, Brisk Cap refill, Toes warm and perfused  Distal sensation grossly intact to Peroneals, Sural, Saphenous, and tibial nrs  No tremors noted on today's exam    CBC:   Lab Results   Component Value Date/Time    WBC 10.6 03/01/2023 05:09 PM    HGB 11.6 03/01/2023 05:09 PM    HCT 36.8 03/01/2023 05:09 PM     03/01/2023 05:09 PM     PT/INR:    Lab Results   Component Value Date/Time    PROTIME 15.3 02/27/2023 06:19 PM    INR 1.3 02/27/2023 06:19 PM       ASSESSMENT  S/P LEFT HIP HEMIARTHROPLASTY     PLAN    Weightbearing as tolerated  Pain management , medical management, DVT prophylaxis per admitting  Appreciate PT OT recommendations  Fall precautions  Orthopedic surgery will follow from periphery  Appreciate medical optimization by admitting team

## 2023-03-02 NOTE — CARE COORDINATION
Patient accepted at [facility] and they can take today. Arranged transport via [facility] for 6:30p Form completed. Have notified wife at Bedside. TAWNYA covid was negative. JAMIE needs signed.

## 2023-03-22 ENCOUNTER — APPOINTMENT (OUTPATIENT)
Dept: GENERAL RADIOLOGY | Age: 73
DRG: 908 | End: 2023-03-22
Payer: MEDICARE

## 2023-03-22 ENCOUNTER — HOSPITAL ENCOUNTER (INPATIENT)
Age: 73
LOS: 6 days | Discharge: NURSING, REHAB OR LONG-TERM CARE | DRG: 908 | End: 2023-03-28
Attending: EMERGENCY MEDICINE | Admitting: ORTHOPAEDIC SURGERY
Payer: MEDICARE

## 2023-03-22 DIAGNOSIS — G89.18 POST-OP PAIN: ICD-10-CM

## 2023-03-22 DIAGNOSIS — T81.31XA DEHISCENCE OF OPERATIVE WOUND, INITIAL ENCOUNTER: Primary | ICD-10-CM

## 2023-03-22 DIAGNOSIS — T81.49XA SURGICAL SITE INFECTION: ICD-10-CM

## 2023-03-22 PROBLEM — T81.30XA WOUND DEHISCENCE: Status: ACTIVE | Noted: 2023-03-22

## 2023-03-22 LAB
ABO + RH BLD: NORMAL
ALBUMIN SERPL-MCNC: 3.8 G/DL (ref 3.5–5.2)
ALP SERPL-CCNC: 68 U/L (ref 40–129)
ALT SERPL-CCNC: <5 U/L (ref 0–40)
ANION GAP SERPL CALCULATED.3IONS-SCNC: 9 MMOL/L (ref 7–16)
APTT BLD: 32.2 SEC (ref 24.5–35.1)
APTT BLD: 34.5 SEC (ref 24.5–35.1)
AST SERPL-CCNC: 10 U/L (ref 0–39)
BILIRUB SERPL-MCNC: 0.4 MG/DL (ref 0–1.2)
BLD GP AB SCN SERPL QL: NORMAL
BUN SERPL-MCNC: 15 MG/DL (ref 6–23)
CALCIUM SERPL-MCNC: 8.7 MG/DL (ref 8.6–10.2)
CHLORIDE SERPL-SCNC: 102 MMOL/L (ref 98–107)
CO2 SERPL-SCNC: 29 MMOL/L (ref 22–29)
CREAT SERPL-MCNC: 0.7 MG/DL (ref 0.7–1.2)
CRP SERPL HS-MCNC: 0.4 MG/DL (ref 0–0.4)
ERYTHROCYTE [DISTWIDTH] IN BLOOD BY AUTOMATED COUNT: 12.7 FL (ref 11.5–15)
ERYTHROCYTE [DISTWIDTH] IN BLOOD BY AUTOMATED COUNT: 12.9 FL (ref 11.5–15)
ERYTHROCYTE [SEDIMENTATION RATE] IN BLOOD BY WESTERGREN METHOD: 15 MM/HR (ref 0–15)
GLUCOSE SERPL-MCNC: 106 MG/DL (ref 74–99)
HCT VFR BLD AUTO: 29.2 % (ref 37–54)
HCT VFR BLD AUTO: 31.4 % (ref 37–54)
HGB BLD-MCNC: 10.4 G/DL (ref 12.5–16.5)
HGB BLD-MCNC: 9.2 G/DL (ref 12.5–16.5)
INR BLD: 1.2
INR BLD: 1.2
MCH RBC QN AUTO: 29.5 PG (ref 26–35)
MCH RBC QN AUTO: 29.5 PG (ref 26–35)
MCHC RBC AUTO-ENTMCNC: 31.5 % (ref 32–34.5)
MCHC RBC AUTO-ENTMCNC: 33.1 % (ref 32–34.5)
MCV RBC AUTO: 89.2 FL (ref 80–99.9)
MCV RBC AUTO: 93.6 FL (ref 80–99.9)
PLATELET # BLD AUTO: 298 E9/L (ref 130–450)
PLATELET # BLD AUTO: 298 E9/L (ref 130–450)
PMV BLD AUTO: 10 FL (ref 7–12)
PMV BLD AUTO: 10.4 FL (ref 7–12)
POTASSIUM SERPL-SCNC: 3.7 MMOL/L (ref 3.5–5)
PROT SERPL-MCNC: 6.1 G/DL (ref 6.4–8.3)
PROTHROMBIN TIME: 14 SEC (ref 9.3–12.4)
PROTHROMBIN TIME: 14.1 SEC (ref 9.3–12.4)
RBC # BLD AUTO: 3.12 E12/L (ref 3.8–5.8)
RBC # BLD AUTO: 3.52 E12/L (ref 3.8–5.8)
SODIUM SERPL-SCNC: 140 MMOL/L (ref 132–146)
WBC # BLD: 6.6 E9/L (ref 4.5–11.5)
WBC # BLD: 7.5 E9/L (ref 4.5–11.5)

## 2023-03-22 PROCEDURE — 86850 RBC ANTIBODY SCREEN: CPT

## 2023-03-22 PROCEDURE — 85027 COMPLETE CBC AUTOMATED: CPT

## 2023-03-22 PROCEDURE — 99222 1ST HOSP IP/OBS MODERATE 55: CPT | Performed by: INTERNAL MEDICINE

## 2023-03-22 PROCEDURE — 71045 X-RAY EXAM CHEST 1 VIEW: CPT

## 2023-03-22 PROCEDURE — 85651 RBC SED RATE NONAUTOMATED: CPT

## 2023-03-22 PROCEDURE — 85610 PROTHROMBIN TIME: CPT

## 2023-03-22 PROCEDURE — 86140 C-REACTIVE PROTEIN: CPT

## 2023-03-22 PROCEDURE — 86900 BLOOD TYPING SEROLOGIC ABO: CPT

## 2023-03-22 PROCEDURE — 1200000000 HC SEMI PRIVATE

## 2023-03-22 PROCEDURE — 36415 COLL VENOUS BLD VENIPUNCTURE: CPT

## 2023-03-22 PROCEDURE — P9016 RBC LEUKOCYTES REDUCED: HCPCS

## 2023-03-22 PROCEDURE — 86901 BLOOD TYPING SEROLOGIC RH(D): CPT

## 2023-03-22 PROCEDURE — 80053 COMPREHEN METABOLIC PANEL: CPT

## 2023-03-22 PROCEDURE — 86923 COMPATIBILITY TEST ELECTRIC: CPT

## 2023-03-22 PROCEDURE — 93005 ELECTROCARDIOGRAM TRACING: CPT | Performed by: ORTHOPAEDIC SURGERY

## 2023-03-22 PROCEDURE — 73502 X-RAY EXAM HIP UNI 2-3 VIEWS: CPT

## 2023-03-22 PROCEDURE — 85730 THROMBOPLASTIN TIME PARTIAL: CPT

## 2023-03-22 PROCEDURE — 99285 EMERGENCY DEPT VISIT HI MDM: CPT

## 2023-03-22 RX ORDER — ACETAMINOPHEN 325 MG/1
650 TABLET ORAL EVERY 4 HOURS PRN
Status: DISCONTINUED | OUTPATIENT
Start: 2023-03-22 | End: 2023-03-23

## 2023-03-22 RX ORDER — SODIUM CHLORIDE 0.9 % (FLUSH) 0.9 %
10 SYRINGE (ML) INJECTION EVERY 12 HOURS SCHEDULED
Status: DISCONTINUED | OUTPATIENT
Start: 2023-03-22 | End: 2023-03-23

## 2023-03-22 RX ORDER — SODIUM CHLORIDE 9 MG/ML
INJECTION, SOLUTION INTRAVENOUS PRN
Status: DISCONTINUED | OUTPATIENT
Start: 2023-03-22 | End: 2023-03-23

## 2023-03-22 RX ORDER — HALOPERIDOL 5 MG/ML
5 INJECTION INTRAMUSCULAR ONCE
Status: DISCONTINUED | OUTPATIENT
Start: 2023-03-22 | End: 2023-03-23

## 2023-03-22 RX ORDER — OXYCODONE HYDROCHLORIDE 5 MG/1
10 TABLET ORAL EVERY 4 HOURS PRN
Status: DISCONTINUED | OUTPATIENT
Start: 2023-03-22 | End: 2023-03-23

## 2023-03-22 RX ORDER — ASPIRIN 81 MG/1
81 TABLET ORAL DAILY
Status: DISCONTINUED | OUTPATIENT
Start: 2023-03-23 | End: 2023-03-23

## 2023-03-22 RX ORDER — ONDANSETRON 2 MG/ML
4 INJECTION INTRAMUSCULAR; INTRAVENOUS EVERY 6 HOURS PRN
Status: DISCONTINUED | OUTPATIENT
Start: 2023-03-22 | End: 2023-03-23

## 2023-03-22 RX ORDER — OXYCODONE HYDROCHLORIDE 5 MG/1
5 TABLET ORAL EVERY 4 HOURS PRN
Status: DISCONTINUED | OUTPATIENT
Start: 2023-03-22 | End: 2023-03-23

## 2023-03-22 RX ORDER — ASPIRIN 81 MG/1
81 TABLET ORAL DAILY
Status: ON HOLD | COMMUNITY
End: 2023-03-23 | Stop reason: SDUPTHER

## 2023-03-22 RX ORDER — BISACODYL 10 MG
10 SUPPOSITORY, RECTAL RECTAL DAILY PRN
COMMUNITY

## 2023-03-22 RX ORDER — SENNOSIDES 8.8 MG/5ML
5 LIQUID ORAL 2 TIMES DAILY PRN
Status: DISCONTINUED | OUTPATIENT
Start: 2023-03-22 | End: 2023-03-23

## 2023-03-22 RX ORDER — CLOTRIMAZOLE 1 %
1 CREAM (GRAM) TOPICAL 2 TIMES DAILY
COMMUNITY

## 2023-03-22 RX ORDER — SODIUM CHLORIDE 0.9 % (FLUSH) 0.9 %
10 SYRINGE (ML) INJECTION PRN
Status: DISCONTINUED | OUTPATIENT
Start: 2023-03-22 | End: 2023-03-23

## 2023-03-22 ASSESSMENT — ENCOUNTER SYMPTOMS: CHEST TIGHTNESS: 0

## 2023-03-22 ASSESSMENT — PAIN - FUNCTIONAL ASSESSMENT: PAIN_FUNCTIONAL_ASSESSMENT: NONE - DENIES PAIN

## 2023-03-22 NOTE — ED PROVIDER NOTES
74-year-old male presenting from nursing home with concern about bleeding following his left hip surgery. He had surgery February 28 of this year, was back to the nursing home and now has a dehiscence of the left surgical wound. Upon arrival he is awake and alert but he is confused and cannot provide significant history. Family History   Problem Relation Age of Onset    Alzheimer's Disease Mother     Cancer Father      Past Surgical History:   Procedure Laterality Date    DEEP BRAIN STIMULATOR PLACEMENT      L chest    FRACTURE SURGERY      nose    HIP SURGERY Left 2/28/2023    LEFT HIP HEMIARTHROPLASTY (DEPUY) performed by Eve Macias DO at [address] 05/16/2017    knee    KNEE ARTHROSCOPY Right     TONSILLECTOMY      TOTAL KNEE ARTHROPLASTY Left 9/1/2020    TOTAL LEFT KNEE REPLACEMENT/ ARTHROPLASTY (JOLENE) performed by Eve Macias DO at [address]       Review of Systems   Constitutional:  Negative for chills and fever. Respiratory:  Negative for chest tightness. Cardiovascular:  Negative for chest pain. Skin:  Positive for wound. Physical Exam  Constitutional:       General: He is not in acute distress. Appearance: He is well-developed. HENT:      Head: Normocephalic and atraumatic. Eyes:      Pupils: Pupils are equal, round, and reactive to light. Neck:      Thyroid: No thyromegaly. Cardiovascular:      Rate and Rhythm: Normal rate and regular rhythm. Pulmonary:      Effort: Pulmonary effort is normal. No respiratory distress. Breath sounds: Normal breath sounds. No wheezing. Abdominal:      General: There is no distension. Palpations: Abdomen is soft. There is no mass. Tenderness: There is no abdominal tenderness. There is no guarding or rebound. Musculoskeletal:         General: No tenderness. Normal range of motion. Cervical back: Normal range of motion and neck supple. Skin:     General: Skin is warm and dry.

## 2023-03-22 NOTE — LETTER
[place] Department Medicaid  CERTIFICATION OF NECESSITY  FOR NON-EMERGENCY TRANSPORTATION   BY GROUND AMBULANCE      Individual Information   1. Name: Fabi Randolph 2. [place] Medicaid Billing Number:    3. Address: [facility]      Transportation Provider Information   4. Provider Name: [facility]   5. [place] Medicaid Provider Number:  National Provider Identifier (NPI):      Certification  7. Criteria:  During transport, this individual requires:  [x] Medical treatment or continuous     supervision by an EMT. [] The administration or regulation of oxygen by another person. [] Supervised protective restraint. 8. Period Beginning Date: 3/28/2023   9. Length  [x] Not more than 1 day(s)  [] One Year     Additional Information Relevant to Certification   10. Comments or Explanations, If Necessary or Appropriate    Incision dehiscence with surgical closure   Parkinson's disease   Dementia   Fall Risk / Safety Concerns     Certifying Practitioner Information   11. Name of Practitioner: Kamila Montgomery DO   12. [place] Medicaid Provider Number, If Applicable:  [address] Provider Identifier (NPI):      Signature Information   14. Date of Signature: 3/28/2023 15. Name of Person Signing: Larry De Jesus RN   12.  Signature and Professional Designation: Dr Iesha Art / Larry De Jesus RN     [facility] [id number]  Rev. 7/2015

## 2023-03-23 ENCOUNTER — ANESTHESIA (OUTPATIENT)
Dept: OPERATING ROOM | Age: 73
End: 2023-03-23
Payer: MEDICARE

## 2023-03-23 ENCOUNTER — APPOINTMENT (OUTPATIENT)
Dept: GENERAL RADIOLOGY | Age: 73
DRG: 908 | End: 2023-03-23
Payer: MEDICARE

## 2023-03-23 ENCOUNTER — ANESTHESIA EVENT (OUTPATIENT)
Dept: OPERATING ROOM | Age: 73
End: 2023-03-23
Payer: MEDICARE

## 2023-03-23 PROBLEM — G20.A1 PARKINSON DISEASE: Status: ACTIVE | Noted: 2017-05-19

## 2023-03-23 PROBLEM — T81.31XA RUPTURE OF OPERATION WOUND: Status: ACTIVE | Noted: 2023-03-22

## 2023-03-23 LAB
ALBUMIN SERPL-MCNC: 3.5 G/DL (ref 3.5–5.2)
ALP SERPL-CCNC: 61 U/L (ref 40–129)
ALT SERPL-CCNC: <5 U/L (ref 0–40)
ANION GAP SERPL CALCULATED.3IONS-SCNC: 7 MMOL/L (ref 7–16)
AST SERPL-CCNC: 9 U/L (ref 0–39)
BASOPHILS # BLD: 0.04 E9/L (ref 0–0.2)
BASOPHILS NFR BLD: 0.7 % (ref 0–2)
BILIRUB SERPL-MCNC: 0.4 MG/DL (ref 0–1.2)
BUN SERPL-MCNC: 16 MG/DL (ref 6–23)
CALCIUM SERPL-MCNC: 8.4 MG/DL (ref 8.6–10.2)
CHLORIDE SERPL-SCNC: 107 MMOL/L (ref 98–107)
CO2 SERPL-SCNC: 26 MMOL/L (ref 22–29)
CREAT SERPL-MCNC: 0.6 MG/DL (ref 0.7–1.2)
EKG ATRIAL RATE: 84 BPM
EKG P-R INTERVAL: 90 MS
EKG Q-T INTERVAL: 386 MS
EKG QRS DURATION: 78 MS
EKG QTC CALCULATION (BAZETT): 456 MS
EKG R AXIS: 4 DEGREES
EKG T AXIS: 67 DEGREES
EKG VENTRICULAR RATE: 84 BPM
EOSINOPHIL # BLD: 0.12 E9/L (ref 0.05–0.5)
EOSINOPHIL NFR BLD: 2 % (ref 0–6)
ERYTHROCYTE [DISTWIDTH] IN BLOOD BY AUTOMATED COUNT: 12.9 FL (ref 11.5–15)
GLUCOSE SERPL-MCNC: 102 MG/DL (ref 74–99)
HCT VFR BLD AUTO: 26.1 % (ref 37–54)
HGB BLD-MCNC: 8.6 G/DL (ref 12.5–16.5)
IMM GRANULOCYTES # BLD: 0.02 E9/L
IMM GRANULOCYTES NFR BLD: 0.3 % (ref 0–5)
LYMPHOCYTES # BLD: 0.89 E9/L (ref 1.5–4)
LYMPHOCYTES NFR BLD: 14.9 % (ref 20–42)
MCH RBC QN AUTO: 30.9 PG (ref 26–35)
MCHC RBC AUTO-ENTMCNC: 33 % (ref 32–34.5)
MCV RBC AUTO: 93.9 FL (ref 80–99.9)
MONOCYTES # BLD: 0.46 E9/L (ref 0.1–0.95)
MONOCYTES NFR BLD: 7.7 % (ref 2–12)
NEUTROPHILS # BLD: 4.46 E9/L (ref 1.8–7.3)
NEUTS SEG NFR BLD: 74.4 % (ref 43–80)
PLATELET # BLD AUTO: 272 E9/L (ref 130–450)
PMV BLD AUTO: 10.2 FL (ref 7–12)
POTASSIUM SERPL-SCNC: 4.2 MMOL/L (ref 3.5–5)
PROT SERPL-MCNC: 5.6 G/DL (ref 6.4–8.3)
RBC # BLD AUTO: 2.78 E12/L (ref 3.8–5.8)
SODIUM SERPL-SCNC: 140 MMOL/L (ref 132–146)
WBC # BLD: 6 E9/L (ref 4.5–11.5)

## 2023-03-23 PROCEDURE — 2720000010 HC SURG SUPPLY STERILE: Performed by: ORTHOPAEDIC SURGERY

## 2023-03-23 PROCEDURE — 2500000003 HC RX 250 WO HCPCS: Performed by: NURSE ANESTHETIST, CERTIFIED REGISTERED

## 2023-03-23 PROCEDURE — 6360000002 HC RX W HCPCS: Performed by: ANESTHESIOLOGY

## 2023-03-23 PROCEDURE — 87070 CULTURE OTHR SPECIMN AEROBIC: CPT

## 2023-03-23 PROCEDURE — 2580000003 HC RX 258: Performed by: INTERNAL MEDICINE

## 2023-03-23 PROCEDURE — 2580000003 HC RX 258: Performed by: ORTHOPAEDIC SURGERY

## 2023-03-23 PROCEDURE — 97535 SELF CARE MNGMENT TRAINING: CPT

## 2023-03-23 PROCEDURE — 6370000000 HC RX 637 (ALT 250 FOR IP)

## 2023-03-23 PROCEDURE — 6360000002 HC RX W HCPCS: Performed by: ORTHOPAEDIC SURGERY

## 2023-03-23 PROCEDURE — 0SRS0JZ REPLACEMENT OF LEFT HIP JOINT, FEMORAL SURFACE WITH SYNTHETIC SUBSTITUTE, OPEN APPROACH: ICD-10-PCS | Performed by: ORTHOPAEDIC SURGERY

## 2023-03-23 PROCEDURE — 87205 SMEAR GRAM STAIN: CPT

## 2023-03-23 PROCEDURE — 73502 X-RAY EXAM HIP UNI 2-3 VIEWS: CPT

## 2023-03-23 PROCEDURE — 3700000000 HC ANESTHESIA ATTENDED CARE: Performed by: ORTHOPAEDIC SURGERY

## 2023-03-23 PROCEDURE — 6360000002 HC RX W HCPCS: Performed by: NURSE ANESTHETIST, CERTIFIED REGISTERED

## 2023-03-23 PROCEDURE — 3600000014 HC SURGERY LEVEL 4 ADDTL 15MIN: Performed by: ORTHOPAEDIC SURGERY

## 2023-03-23 PROCEDURE — 6370000000 HC RX 637 (ALT 250 FOR IP): Performed by: ORTHOPAEDIC SURGERY

## 2023-03-23 PROCEDURE — 87116 MYCOBACTERIA CULTURE: CPT

## 2023-03-23 PROCEDURE — 87102 FUNGUS ISOLATION CULTURE: CPT

## 2023-03-23 PROCEDURE — 80053 COMPREHEN METABOLIC PANEL: CPT

## 2023-03-23 PROCEDURE — 2709999900 HC NON-CHARGEABLE SUPPLY: Performed by: ORTHOPAEDIC SURGERY

## 2023-03-23 PROCEDURE — 6360000002 HC RX W HCPCS

## 2023-03-23 PROCEDURE — 36415 COLL VENOUS BLD VENIPUNCTURE: CPT

## 2023-03-23 PROCEDURE — 1200000000 HC SEMI PRIVATE

## 2023-03-23 PROCEDURE — 93010 ELECTROCARDIOGRAM REPORT: CPT | Performed by: INTERNAL MEDICINE

## 2023-03-23 PROCEDURE — 6370000000 HC RX 637 (ALT 250 FOR IP): Performed by: INTERNAL MEDICINE

## 2023-03-23 PROCEDURE — 7100000001 HC PACU RECOVERY - ADDTL 15 MIN: Performed by: ORTHOPAEDIC SURGERY

## 2023-03-23 PROCEDURE — 87075 CULTR BACTERIA EXCEPT BLOOD: CPT

## 2023-03-23 PROCEDURE — 0SPS0JZ REMOVAL OF SYNTHETIC SUBSTITUTE FROM LEFT HIP JOINT, FEMORAL SURFACE, OPEN APPROACH: ICD-10-PCS | Performed by: ORTHOPAEDIC SURGERY

## 2023-03-23 PROCEDURE — C1776 JOINT DEVICE (IMPLANTABLE): HCPCS | Performed by: ORTHOPAEDIC SURGERY

## 2023-03-23 PROCEDURE — 0S9B0ZZ DRAINAGE OF LEFT HIP JOINT, OPEN APPROACH: ICD-10-PCS | Performed by: ORTHOPAEDIC SURGERY

## 2023-03-23 PROCEDURE — 97165 OT EVAL LOW COMPLEX 30 MIN: CPT

## 2023-03-23 PROCEDURE — 3600000004 HC SURGERY LEVEL 4 BASE: Performed by: ORTHOPAEDIC SURGERY

## 2023-03-23 PROCEDURE — 85025 COMPLETE CBC W/AUTO DIFF WBC: CPT

## 2023-03-23 PROCEDURE — 99232 SBSQ HOSP IP/OBS MODERATE 35: CPT | Performed by: INTERNAL MEDICINE

## 2023-03-23 PROCEDURE — 87206 SMEAR FLUORESCENT/ACID STAI: CPT

## 2023-03-23 PROCEDURE — 7100000000 HC PACU RECOVERY - FIRST 15 MIN: Performed by: ORTHOPAEDIC SURGERY

## 2023-03-23 PROCEDURE — 3700000001 HC ADD 15 MINUTES (ANESTHESIA): Performed by: ORTHOPAEDIC SURGERY

## 2023-03-23 DEVICE — SELF CENTERING BI-POLAR HEAD 28MM ID 49MM OD
Type: IMPLANTABLE DEVICE | Site: FEMUR | Status: FUNCTIONAL
Brand: SELF CENTERING

## 2023-03-23 DEVICE — ARTICUL/EZE FEMORAL HEAD DIAMETER 28MM +1.5 12/14 TAPER
Type: IMPLANTABLE DEVICE | Site: FEMUR | Status: FUNCTIONAL
Brand: ARTICUL/EZE

## 2023-03-23 RX ORDER — WATER 1000 ML/1000ML
INJECTION, SOLUTION INTRAVENOUS
Status: DISCONTINUED
Start: 2023-03-23 | End: 2023-03-23

## 2023-03-23 RX ORDER — OXYCODONE HYDROCHLORIDE 5 MG/1
10 TABLET ORAL EVERY 6 HOURS PRN
Status: DISCONTINUED | OUTPATIENT
Start: 2023-03-23 | End: 2023-03-28 | Stop reason: HOSPADM

## 2023-03-23 RX ORDER — GLYCOPYRROLATE 0.2 MG/ML
INJECTION INTRAMUSCULAR; INTRAVENOUS PRN
Status: DISCONTINUED | OUTPATIENT
Start: 2023-03-23 | End: 2023-03-23 | Stop reason: SDUPTHER

## 2023-03-23 RX ORDER — ONDANSETRON 2 MG/ML
4 INJECTION INTRAMUSCULAR; INTRAVENOUS EVERY 6 HOURS PRN
Status: DISCONTINUED | OUTPATIENT
Start: 2023-03-23 | End: 2023-03-28 | Stop reason: HOSPADM

## 2023-03-23 RX ORDER — FENTANYL CITRATE 50 UG/ML
INJECTION, SOLUTION INTRAMUSCULAR; INTRAVENOUS PRN
Status: DISCONTINUED | OUTPATIENT
Start: 2023-03-23 | End: 2023-03-23 | Stop reason: SDUPTHER

## 2023-03-23 RX ORDER — OXYCODONE HYDROCHLORIDE 5 MG/1
5 TABLET ORAL EVERY 6 HOURS PRN
Qty: 20 TABLET | Refills: 0 | Status: SHIPPED | OUTPATIENT
Start: 2023-03-23 | End: 2023-03-28

## 2023-03-23 RX ORDER — SODIUM CHLORIDE 9 MG/ML
INJECTION, SOLUTION INTRAVENOUS CONTINUOUS
Status: ACTIVE | OUTPATIENT
Start: 2023-03-23 | End: 2023-03-24

## 2023-03-23 RX ORDER — SODIUM CHLORIDE 0.9 % (FLUSH) 0.9 %
5-40 SYRINGE (ML) INJECTION PRN
Status: DISCONTINUED | OUTPATIENT
Start: 2023-03-23 | End: 2023-03-28 | Stop reason: HOSPADM

## 2023-03-23 RX ORDER — OXYCODONE HYDROCHLORIDE 5 MG/1
5 TABLET ORAL EVERY 6 HOURS PRN
Status: DISCONTINUED | OUTPATIENT
Start: 2023-03-23 | End: 2023-03-28 | Stop reason: HOSPADM

## 2023-03-23 RX ORDER — ASPIRIN 81 MG/1
81 TABLET ORAL 2 TIMES DAILY
Qty: 30 TABLET | Refills: 3 | Status: SHIPPED | OUTPATIENT
Start: 2023-03-23 | End: 2023-03-23 | Stop reason: SDUPTHER

## 2023-03-23 RX ORDER — LIDOCAINE HYDROCHLORIDE 20 MG/ML
INJECTION, SOLUTION INTRAVENOUS PRN
Status: DISCONTINUED | OUTPATIENT
Start: 2023-03-23 | End: 2023-03-23 | Stop reason: SDUPTHER

## 2023-03-23 RX ORDER — NEOSTIGMINE METHYLSULFATE 1 MG/ML
INJECTION, SOLUTION INTRAVENOUS PRN
Status: DISCONTINUED | OUTPATIENT
Start: 2023-03-23 | End: 2023-03-23 | Stop reason: SDUPTHER

## 2023-03-23 RX ORDER — POLYETHYLENE GLYCOL 3350 17 G/17G
17 POWDER, FOR SOLUTION ORAL DAILY PRN
Status: DISCONTINUED | OUTPATIENT
Start: 2023-03-23 | End: 2023-03-28 | Stop reason: HOSPADM

## 2023-03-23 RX ORDER — SODIUM CHLORIDE 9 MG/ML
INJECTION, SOLUTION INTRAVENOUS CONTINUOUS
Status: DISCONTINUED | OUTPATIENT
Start: 2023-03-23 | End: 2023-03-23

## 2023-03-23 RX ORDER — ASPIRIN 81 MG/1
81 TABLET ORAL 2 TIMES DAILY
Qty: 60 TABLET | Refills: 0 | Status: SHIPPED | OUTPATIENT
Start: 2023-03-23

## 2023-03-23 RX ORDER — ACETAMINOPHEN 325 MG/1
650 TABLET ORAL EVERY 6 HOURS
Status: DISCONTINUED | OUTPATIENT
Start: 2023-03-23 | End: 2023-03-28 | Stop reason: HOSPADM

## 2023-03-23 RX ORDER — VANCOMYCIN HYDROCHLORIDE 1 G/20ML
INJECTION, POWDER, LYOPHILIZED, FOR SOLUTION INTRAVENOUS PRN
Status: DISCONTINUED | OUTPATIENT
Start: 2023-03-23 | End: 2023-03-23 | Stop reason: ALTCHOICE

## 2023-03-23 RX ORDER — ONDANSETRON 4 MG/1
4 TABLET, ORALLY DISINTEGRATING ORAL EVERY 8 HOURS PRN
Status: DISCONTINUED | OUTPATIENT
Start: 2023-03-23 | End: 2023-03-28 | Stop reason: HOSPADM

## 2023-03-23 RX ORDER — SODIUM CHLORIDE 0.9 % (FLUSH) 0.9 %
5-40 SYRINGE (ML) INJECTION EVERY 12 HOURS SCHEDULED
Status: DISCONTINUED | OUTPATIENT
Start: 2023-03-23 | End: 2023-03-28 | Stop reason: HOSPADM

## 2023-03-23 RX ORDER — SODIUM CHLORIDE 9 MG/ML
INJECTION, SOLUTION INTRAVENOUS PRN
Status: DISCONTINUED | OUTPATIENT
Start: 2023-03-23 | End: 2023-03-28 | Stop reason: HOSPADM

## 2023-03-23 RX ORDER — ROCURONIUM BROMIDE 10 MG/ML
INJECTION, SOLUTION INTRAVENOUS PRN
Status: DISCONTINUED | OUTPATIENT
Start: 2023-03-23 | End: 2023-03-23 | Stop reason: SDUPTHER

## 2023-03-23 RX ORDER — CEFAZOLIN 2 G/1
INJECTION, POWDER, FOR SOLUTION INTRAMUSCULAR; INTRAVENOUS
Status: DISCONTINUED
Start: 2023-03-23 | End: 2023-03-23

## 2023-03-23 RX ORDER — ASPIRIN 81 MG/1
81 TABLET ORAL 2 TIMES DAILY
Status: DISCONTINUED | OUTPATIENT
Start: 2023-03-24 | End: 2023-03-28 | Stop reason: HOSPADM

## 2023-03-23 RX ORDER — MORPHINE SULFATE 4 MG/ML
4 INJECTION, SOLUTION INTRAMUSCULAR; INTRAVENOUS EVERY 4 HOURS PRN
Status: DISCONTINUED | OUTPATIENT
Start: 2023-03-23 | End: 2023-03-28 | Stop reason: HOSPADM

## 2023-03-23 RX ORDER — MORPHINE SULFATE 2 MG/ML
2 INJECTION, SOLUTION INTRAMUSCULAR; INTRAVENOUS EVERY 4 HOURS PRN
Status: DISCONTINUED | OUTPATIENT
Start: 2023-03-23 | End: 2023-03-28 | Stop reason: HOSPADM

## 2023-03-23 RX ORDER — MIDAZOLAM HYDROCHLORIDE 1 MG/ML
INJECTION INTRAMUSCULAR; INTRAVENOUS
Status: COMPLETED
Start: 2023-03-23 | End: 2023-03-23

## 2023-03-23 RX ORDER — MIDAZOLAM HYDROCHLORIDE 1 MG/ML
1 INJECTION INTRAMUSCULAR; INTRAVENOUS EVERY 5 MIN PRN
Status: COMPLETED | OUTPATIENT
Start: 2023-03-23 | End: 2023-03-23

## 2023-03-23 RX ORDER — PROPOFOL 10 MG/ML
INJECTION, EMULSION INTRAVENOUS PRN
Status: DISCONTINUED | OUTPATIENT
Start: 2023-03-23 | End: 2023-03-23 | Stop reason: SDUPTHER

## 2023-03-23 RX ADMIN — PHENYLEPHRINE HYDROCHLORIDE 100 MCG: 10 INJECTION INTRAVENOUS at 18:02

## 2023-03-23 RX ADMIN — LIDOCAINE HYDROCHLORIDE 50 MG: 20 INJECTION, SOLUTION INTRAVENOUS at 17:47

## 2023-03-23 RX ADMIN — MORPHINE SULFATE 4 MG: 4 INJECTION, SOLUTION INTRAMUSCULAR; INTRAVENOUS at 22:36

## 2023-03-23 RX ADMIN — PHENYLEPHRINE HYDROCHLORIDE 100 MCG: 10 INJECTION INTRAVENOUS at 18:48

## 2023-03-23 RX ADMIN — SODIUM CHLORIDE: 900 INJECTION, SOLUTION INTRAVENOUS at 18:28

## 2023-03-23 RX ADMIN — CEFAZOLIN 2000 MG: 2 INJECTION, POWDER, FOR SOLUTION INTRAMUSCULAR; INTRAVENOUS at 17:52

## 2023-03-23 RX ADMIN — Medication 3 MG: at 19:10

## 2023-03-23 RX ADMIN — PHENYLEPHRINE HYDROCHLORIDE 100 MCG: 10 INJECTION INTRAVENOUS at 18:39

## 2023-03-23 RX ADMIN — FENTANYL CITRATE 50 MCG: 50 INJECTION, SOLUTION INTRAMUSCULAR; INTRAVENOUS at 19:17

## 2023-03-23 RX ADMIN — MIDAZOLAM 1 MG: 1 INJECTION INTRAMUSCULAR; INTRAVENOUS at 20:14

## 2023-03-23 RX ADMIN — SODIUM CHLORIDE: 900 INJECTION, SOLUTION INTRAVENOUS at 05:53

## 2023-03-23 RX ADMIN — ROCURONIUM BROMIDE 50 MG: 10 SOLUTION INTRAVENOUS at 17:49

## 2023-03-23 RX ADMIN — CARBIDOPA AND LEVODOPA 2 TABLET: 25; 100 TABLET ORAL at 22:26

## 2023-03-23 RX ADMIN — MIDAZOLAM 1 MG: 1 INJECTION INTRAMUSCULAR; INTRAVENOUS at 20:22

## 2023-03-23 RX ADMIN — CARBIDOPA AND LEVODOPA 2 TABLET: 25; 100 TABLET ORAL at 08:45

## 2023-03-23 RX ADMIN — OXYCODONE HYDROCHLORIDE 10 MG: 5 TABLET ORAL at 11:03

## 2023-03-23 RX ADMIN — FENTANYL CITRATE 50 MCG: 50 INJECTION, SOLUTION INTRAMUSCULAR; INTRAVENOUS at 19:10

## 2023-03-23 RX ADMIN — SODIUM CHLORIDE: 9 INJECTION, SOLUTION INTRAVENOUS at 22:25

## 2023-03-23 RX ADMIN — CARBIDOPA AND LEVODOPA 2 TABLET: 25; 100 TABLET ORAL at 00:51

## 2023-03-23 RX ADMIN — FENTANYL CITRATE 50 MCG: 50 INJECTION, SOLUTION INTRAMUSCULAR; INTRAVENOUS at 17:47

## 2023-03-23 RX ADMIN — PROPOFOL 150 MG: 10 INJECTION, EMULSION INTRAVENOUS at 17:47

## 2023-03-23 RX ADMIN — Medication 10 ML: at 22:25

## 2023-03-23 RX ADMIN — FENTANYL CITRATE 100 MCG: 50 INJECTION, SOLUTION INTRAMUSCULAR; INTRAVENOUS at 18:20

## 2023-03-23 RX ADMIN — CARBIDOPA AND LEVODOPA 2 TABLET: 25; 100 TABLET ORAL at 14:04

## 2023-03-23 RX ADMIN — ACETAMINOPHEN 650 MG: 325 TABLET ORAL at 22:26

## 2023-03-23 RX ADMIN — PHENYLEPHRINE HYDROCHLORIDE 100 MCG: 10 INJECTION INTRAVENOUS at 17:56

## 2023-03-23 RX ADMIN — GLYCOPYRROLATE 0.6 MG: 0.2 INJECTION INTRAMUSCULAR; INTRAVENOUS at 19:10

## 2023-03-23 ASSESSMENT — PAIN DESCRIPTION - LOCATION: LOCATION: HIP

## 2023-03-23 ASSESSMENT — PAIN SCALES - GENERAL
PAINLEVEL_OUTOF10: 10
PAINLEVEL_OUTOF10: 4
PAINLEVEL_OUTOF10: 0

## 2023-03-23 ASSESSMENT — PAIN DESCRIPTION - DESCRIPTORS: DESCRIPTORS: TENDER;DISCOMFORT;STABBING

## 2023-03-23 ASSESSMENT — PAIN DESCRIPTION - ORIENTATION: ORIENTATION: LEFT

## 2023-03-23 ASSESSMENT — PAIN - FUNCTIONAL ASSESSMENT: PAIN_FUNCTIONAL_ASSESSMENT: PREVENTS OR INTERFERES SOME ACTIVE ACTIVITIES AND ADLS

## 2023-03-23 NOTE — PROGRESS NOTES
Physical Therapy Initial Evaluation/Plan of Care    Room #:  0323/0323-01  Patient Name: Ja Araujo  YOB: 1950  MRN: [id number]    Date of Service: 3/24/2023     Tentative placement recommendation: Acute Rehab  Equipment recommendation: To be determined      Evaluating Physical Therapist: Crystal Jackson, PT #[id number]      Specific Provider Orders/Date/Referring Provider :  03/22/23 1930    PT eval and treat  Start:  03/22/23 1930,   End:  03/22/23 1930,   ONE TIME,   Standing Count:  1 Occurrences,   R        Last continued at transfer on Wed Mar 22, 2023  8:50 PM    Kellie Biggs DO     Admitting Diagnosis:   Wound dehiscence Hu Nam     from nursing home with concern about bleeding following his left hip surgery. He had surgery February 28 of this year, was back to the nursing home and now has a dehiscence of the left surgical wound  Surgery:    left hip hemiarthroplasty by Dr. Perez on 2-  Date of Procedure: 3/23/2023  Procedure:  Left hip irrigation debridement with femoral head exchange and wound closure. Surgeon(s): Luis Lees DO    Visit Diagnoses         Codes    Dehiscence of operative wound, initial encounter    -  Primary T81.31XA    Surgical site infection     T81.49XA    Post-op pain     G89.18            Patient Active Problem List   Diagnosis    S/P knee replacement    Parkinson disease ([facility] [address]    Arthritis of left knee    Intertrochanteric fracture of left femur, closed, initial encounter [facility])    Fall    Rupture of operation wound        ASSESSMENT of Current Deficits Patient exhibits decreased strength, balance, and endurance impairing functional mobility, transfers, gait , gait distance, and tolerance to activity history of parkinson's with jerky movements spouse reports this is baseline. Requires assist of 2 for functional mobility, left lateral and posterior lean in standing unable to advance lower extremities for gait.   Patient requires continued skilled

## 2023-03-23 NOTE — PLAN OF CARE
Problem: Skin/Tissue Integrity  Goal: Absence of new skin breakdown  Description: 1. Monitor for areas of redness and/or skin breakdown  2. Assess vascular access sites hourly  3. Every 4-6 hours minimum:  Change oxygen saturation probe site  4. Every 4-6 hours:  If on nasal continuous positive airway pressure, respiratory therapy assess nares and determine need for appliance change or resting period.   3/23/2023 0951 by Maria Eugenia Richmond RN  Outcome: Progressing     Problem: Safety - Adult  Goal: Free from fall injury  3/23/2023 0951 by Maria Eugenia Richmond RN  Outcome: Progressing

## 2023-03-23 NOTE — DISCHARGE INSTRUCTIONS
the cut is cold or pale or changes color. You have trouble moving the area near the cut. You have symptoms of infection, such as: Increased pain, swelling, warmth, or redness around the cut. Red streaks leading from the cut. Pus draining from the cut. A fever. Watch closely for changes in your health, and be sure to contact your doctor if:    You can't pack the cut as you were instructed. The cut is not closing (getting smaller). You do not get better as expected. Where can you learn more? Go to http://www.woods.com/ and enter E560 to learn more about \"Opened Cut After Surgery: Care Instructions. \"  Current as of: November 30, 2022               Content Version: 13.6  © 2006-2023 [employer]wise[employer] Inc[employer]. Care instructions adapted under license by [facility] ([facility]). If you have questions about a medical condition or this instruction, always ask your healthcare professional. [place] [address] any warranty or liability for your use of this information. negative

## 2023-03-23 NOTE — PLAN OF CARE
Problem: Discharge Planning  Goal: Discharge to home or other facility with appropriate resources  Outcome: Progressing  Flowsheets (Taken 3/22/2023 2214)  Discharge to home or other facility with appropriate resources: Identify barriers to discharge with patient and caregiver     Problem: Skin/Tissue Integrity  Goal: Absence of new skin breakdown  Description: 1. Monitor for areas of redness and/or skin breakdown  2. Assess vascular access sites hourly  3. Every 4-6 hours minimum:  Change oxygen saturation probe site  4. Every 4-6 hours:  If on nasal continuous positive airway pressure, respiratory therapy assess nares and determine need for appliance change or resting period.   Outcome: Progressing     Problem: Safety - Adult  Goal: Free from fall injury  Outcome: Progressing     Problem: ABCDS Injury Assessment  Goal: Absence of physical injury  Outcome: Progressing

## 2023-03-23 NOTE — CARE COORDINATION
Case Management Assessment  Initial Evaluation    Date/Time of Evaluation: 3/23/2023 2:56 PM  Assessment Completed by: JORGE Hawk    If patient is discharged prior to next notation, then this note serves as note for discharge by case management. Patient Name: Olman Cleary                   YOB: 1950  Diagnosis: Wound dehiscence Barbra Townsend                   Date / Time: 3/22/2023  2:06 PM    Patient Admission Status: Inpatient   Readmission Risk (Low < 19, Mod (19-27), High > 27): Readmission Risk Score: 14.4    Current PCP: None Provider  PCP verified by CM? Yes (Dr Irasema Pacheco)    Chart Reviewed: Yes      History Provided by: Spouse  Patient Orientation: Unable to Assess    Patient Cognition: Other (see comment) (pt sleeping- unable to assess)    Hospitalization in the last 30 days (Readmission):  Yes    If yes, Readmission Assessment in  Navigator will be completed.     Readmission Assessment  Number of Days since last admission?: 8-30 days  Previous Disposition: SNF  Who is being Interviewed: Caregiver (pt's wife- Belinda Forth)  What was the patient's/caregiver's perception as to why they think they needed to return back to the hospital?: Other (Comment) (pt fell at SNF- dx wound dehiscence)  Did you visit your Primary Care Physician after you left the hospital, before you returned this time?: No (pt from SNF)  Why weren't you able to visit your PCP?: Did not have an appointment  Did you see a specialist, such as Cardiac, Pulmonary, Orthopedic Physician, etc. after you left the hospital?: No  Who advised the patient to return to the hospital?: Physician  Does the patient report anything that got in the way of taking their medications?: No  In our efforts to provide the best possible care to you and others like you, can you think of anything that we could have done to help you after you left the hospital the first time, so that you might not have needed to return so soon?: Other (Comment)

## 2023-03-23 NOTE — PROGRESS NOTES
Assist    Functional Mobility N/T as pt could not maintain NWB: L LE at this time  Minimal Assist    Balance Sitting:     Static: fair plus at EOB    Dynamic: fair minus at EOB during grooming, posterior lean when fatigued. Standing: poor with wheeled walker  Sitting:     Static: good     Dynamic: good   Standing: good  with wheeled walker   Activity Tolerance fair  good    Visual/  Perceptual Glasses: no                Hand Dominance: right      AROM (PROM) Strength Additional Info:  Goal:   RUE  WFL 4/5  good  and wfl FMC/dexterity noted during ADL tasks   Improve overall RUE strength  for participation in functional tasks   LUE WFL 4/5  good  and wfl FMC/dexterity noted during ADL tasks   Improve overall LUE strength  for participation in functional tasks     [facility] [employer] [facility]   Sensation:  No c/o numbness or tingling  Tone: WFL   Edema: none    Comments: Upon arrival the patient was supine with R LE hanging off edge of bed. At end of session, patient was supine and positioned in bed for symmetry and comfort. Call light and phone within reach, all lines and tubes intact. Overall patient demonstrated decreased independence and safety during completion of ADL/functional transfer/mobility tasks. Pt would benefit from continued skilled OT to increase safety and independence with completion of ADL/IADL tasks for functional independence and quality of life. Treatment: OT treatment provided this date includes:   Instruction/training on safety and adapted techniques for completion of ADLs   Instruction/training on safe functional mobility/transfer techniques   Instruction/training on energy conservation/work simplification for completion of ADLs   Instruction/training on proper positioning/alignment to prevent contractures    BUE AROM exercises: 10 X in all planes of movement to increase ROM required for functional transfers/ADL participation.  Exercises completed in shoulder and elbow flexion/extension,

## 2023-03-23 NOTE — H&P
I have reviewed the history and physical and examined the patient and find no relevant changes. I have reviewed with the patient and/or family the risks, benefits, and alternatives to the procedure. The patient was counseled at length about the risks of alf Covid-19 during their perioperative period and any recovery window from their procedure. The patient was made aware that alf Covid-19  may worsen their prognosis for recovering from their procedure  and lend to a higher morbidity and/or mortality risk. All material risks, benefits, and reasonable alternatives including postponing the procedure were discussed. The patient does wish to proceed with the procedure at this time.         Caprice Simmonds,   3/23/2023

## 2023-03-23 NOTE — PROGRESS NOTES
Physical Therapy    Room #: 0323/0323-01  Date: 3/23/2023       Patient Name: Linh Juares  : 1950      MRN: [id number]     Patient unavailable for physical therapy eval due to scheduled for procedure @ 1600 . Will attempt PT evaluation tomorrow. Thank you.        Jean Philippe, PT

## 2023-03-23 NOTE — ACP (ADVANCE CARE PLANNING)
Advance Care Planning   Healthcare Decision Maker:    Primary Decision Maker: Umer Jin - [phone]    Click here to complete Healthcare Decision Makers including selection of the Healthcare Decision Maker Relationship (ie \"Primary\"). Today we documented Decision Maker(s) consistent with Legal Next of Kin hierarchy.

## 2023-03-23 NOTE — ANESTHESIA PRE PROCEDURE
per day Vaughn Picking, DO        sodium chloride flush 0.9 % injection 10 mL  10 mL IntraVENous PRN Vaughn Picking, DO        0.9 % sodium chloride infusion   IntraVENous PRN Vaughn Picking, DO        magnesium hydroxide (MILK OF MAGNESIA) 400 MG/5ML suspension 30 mL  30 mL Oral Daily PRN Lea Picking, DO        acetaminophen (TYLENOL) tablet 650 mg  650 mg Oral Q4H PRN Vaughn Picking, DO        oxyCODONE (ROXICODONE) immediate release tablet 5 mg  5 mg Oral Q4H PRN Lea Picking, DO        Or    oxyCODONE (ROXICODONE) immediate release tablet 10 mg  10 mg Oral Q4H PRN Lea Picking, DO   10 mg at 03/23/23 1103    senna (SENOKOT) 8.8 MG/5ML syrup 8.8 mg  5 mL Oral BID PRN Vaughn Picking, DO        carbidopa-levodopa (SINEMET)  MG per tablet 2 tablet  2 tablet Oral 4x Daily Bhumi Faust MD   2 tablet at 03/23/23 1404    aspirin EC tablet 81 mg  81 mg Oral Daily Bhumi Faust MD           Allergies: Allergies   Allergen Reactions    Seasonal        Problem List:    Patient Active Problem List   Diagnosis Code    S/P knee replacement Z96.659    Parkinson disease ([facility] [address] G20    Arthritis of left knee M17.12    Intertrochanteric fracture of left femur, closed, initial encounter [facility]) S72.142A   [address] XXXA    Rupture of operation wound T81. 31XA       Past Medical History:        Diagnosis Date    Parkinson's disease [facility])        Past Surgical History:        Procedure Laterality Date    DEEP BRAIN STIMULATOR PLACEMENT      L chest    FRACTURE SURGERY      nose    HIP SURGERY Left 2/28/2023    LEFT HIP HEMIARTHROPLASTY (DEPUY) performed by Carlos Moore DO at [address] [facility] Right 05/16/2017    knee    KNEE ARTHROSCOPY Right     TONSILLECTOMY      TOTAL KNEE ARTHROPLASTY Left 9/1/2020    TOTAL LEFT KNEE REPLACEMENT/ ARTHROPLASTY (JOLENE) performed by Carlos Moore DO at [address] History:    Social History     Tobacco Use    Smoking status: Never   

## 2023-03-23 NOTE — PROGRESS NOTES
auscultation  CARDIOVASCULAR:  normal apical pulses and normal S1 and S2  ABDOMEN:  normal bowel sounds, non-distended, and non-tender  MUSCULOSKELETAL:  full range of motion noted  NEUROLOGIC:  unable to ascertain  SKIN:  no bruising or bleeding  Data    CBC:   Lab Results   Component Value Date/Time    WBC 6.0 03/23/2023 09:32 AM    RBC 2.78 03/23/2023 09:32 AM    HGB 8.6 03/23/2023 09:32 AM    HCT 26.1 03/23/2023 09:32 AM    MCV 93.9 03/23/2023 09:32 AM    MCH 30.9 03/23/2023 09:32 AM    MCHC 33.0 03/23/2023 09:32 AM    RDW 12.9 03/23/2023 09:32 AM     03/23/2023 09:32 AM    MPV 10.2 03/23/2023 09:32 AM     BMP:    Lab Results   Component Value Date/Time     03/23/2023 09:32 AM    K 4.2 03/23/2023 09:32 AM    K 4.1 02/28/2023 07:01 AM     03/23/2023 09:32 AM    CO2 26 03/23/2023 09:32 AM    BUN 16 03/23/2023 09:32 AM    LABALBU 3.5 03/23/2023 09:32 AM    CREATININE 0.6 03/23/2023 09:32 AM    CALCIUM 8.4 03/23/2023 09:32 AM    GFRAA >60 08/31/2020 09:20 AM    LABGLOM >60 03/23/2023 09:32 AM    GLUCOSE 102 03/23/2023 09:32 AM       ASSESSMENT AND PLAN      Rupture of operation wound: defer management to orthopedic team.  Parkinson disease: continue to monitor closely. Continue home medications  Anemia: may be due to post op loss. Overall this is stable.  Will monitor

## 2023-03-24 LAB
ANION GAP SERPL CALCULATED.3IONS-SCNC: 7 MMOL/L (ref 7–16)
BUN SERPL-MCNC: 14 MG/DL (ref 6–23)
CALCIUM SERPL-MCNC: 7.8 MG/DL (ref 8.6–10.2)
CHLORIDE SERPL-SCNC: 105 MMOL/L (ref 98–107)
CO2 SERPL-SCNC: 25 MMOL/L (ref 22–29)
CREAT SERPL-MCNC: 0.7 MG/DL (ref 0.7–1.2)
ERYTHROCYTE [DISTWIDTH] IN BLOOD BY AUTOMATED COUNT: 12.7 FL (ref 11.5–15)
GLUCOSE SERPL-MCNC: 95 MG/DL (ref 74–99)
HCT VFR BLD AUTO: 23.8 % (ref 37–54)
HGB BLD-MCNC: 7.4 G/DL (ref 12.5–16.5)
MCH RBC QN AUTO: 29.6 PG (ref 26–35)
MCHC RBC AUTO-ENTMCNC: 31.1 % (ref 32–34.5)
MCV RBC AUTO: 95.2 FL (ref 80–99.9)
PLATELET # BLD AUTO: 223 E9/L (ref 130–450)
PMV BLD AUTO: 11.1 FL (ref 7–12)
POTASSIUM SERPL-SCNC: 4.1 MMOL/L (ref 3.5–5)
RBC # BLD AUTO: 2.5 E12/L (ref 3.8–5.8)
SODIUM SERPL-SCNC: 137 MMOL/L (ref 132–146)
WBC # BLD: 8.8 E9/L (ref 4.5–11.5)

## 2023-03-24 PROCEDURE — 99232 SBSQ HOSP IP/OBS MODERATE 35: CPT | Performed by: INTERNAL MEDICINE

## 2023-03-24 PROCEDURE — 6370000000 HC RX 637 (ALT 250 FOR IP): Performed by: ORTHOPAEDIC SURGERY

## 2023-03-24 PROCEDURE — 6360000002 HC RX W HCPCS: Performed by: ORTHOPAEDIC SURGERY

## 2023-03-24 PROCEDURE — 80048 BASIC METABOLIC PNL TOTAL CA: CPT

## 2023-03-24 PROCEDURE — 36415 COLL VENOUS BLD VENIPUNCTURE: CPT

## 2023-03-24 PROCEDURE — 97110 THERAPEUTIC EXERCISES: CPT | Performed by: PHYSICAL THERAPIST

## 2023-03-24 PROCEDURE — 6360000002 HC RX W HCPCS: Performed by: INTERNAL MEDICINE

## 2023-03-24 PROCEDURE — 85027 COMPLETE CBC AUTOMATED: CPT

## 2023-03-24 PROCEDURE — 97161 PT EVAL LOW COMPLEX 20 MIN: CPT | Performed by: PHYSICAL THERAPIST

## 2023-03-24 PROCEDURE — 1200000000 HC SEMI PRIVATE

## 2023-03-24 PROCEDURE — 97535 SELF CARE MNGMENT TRAINING: CPT

## 2023-03-24 PROCEDURE — 2580000003 HC RX 258: Performed by: ORTHOPAEDIC SURGERY

## 2023-03-24 PROCEDURE — 97530 THERAPEUTIC ACTIVITIES: CPT | Performed by: PHYSICAL THERAPIST

## 2023-03-24 PROCEDURE — 97530 THERAPEUTIC ACTIVITIES: CPT

## 2023-03-24 RX ORDER — DIPHENHYDRAMINE HYDROCHLORIDE 50 MG/ML
12.5 INJECTION INTRAMUSCULAR; INTRAVENOUS ONCE
Status: COMPLETED | OUTPATIENT
Start: 2023-03-24 | End: 2023-03-24

## 2023-03-24 RX ORDER — BISACODYL 5 MG/1
5 TABLET, DELAYED RELEASE ORAL DAILY
Status: DISCONTINUED | OUTPATIENT
Start: 2023-03-24 | End: 2023-03-28 | Stop reason: HOSPADM

## 2023-03-24 RX ORDER — DIPHENHYDRAMINE HCL 25 MG
12.5 TABLET ORAL EVERY 4 HOURS PRN
Status: DISCONTINUED | OUTPATIENT
Start: 2023-03-24 | End: 2023-03-28 | Stop reason: HOSPADM

## 2023-03-24 RX ADMIN — Medication 10 ML: at 23:56

## 2023-03-24 RX ADMIN — CARBIDOPA AND LEVODOPA 2 TABLET: 25; 100 TABLET ORAL at 13:41

## 2023-03-24 RX ADMIN — DIPHENHYDRAMINE HYDROCHLORIDE 12.5 MG: 50 INJECTION, SOLUTION INTRAMUSCULAR; INTRAVENOUS at 19:48

## 2023-03-24 RX ADMIN — ACETAMINOPHEN 650 MG: 325 TABLET ORAL at 13:41

## 2023-03-24 RX ADMIN — ASPIRIN 81 MG: 81 TABLET, COATED ORAL at 20:58

## 2023-03-24 RX ADMIN — CARBIDOPA AND LEVODOPA 2 TABLET: 25; 100 TABLET ORAL at 20:58

## 2023-03-24 RX ADMIN — MORPHINE SULFATE 2 MG: 2 INJECTION, SOLUTION INTRAMUSCULAR; INTRAVENOUS at 03:38

## 2023-03-24 RX ADMIN — SODIUM CHLORIDE: 9 INJECTION, SOLUTION INTRAVENOUS at 03:41

## 2023-03-24 RX ADMIN — ASPIRIN 81 MG: 81 TABLET, COATED ORAL at 13:41

## 2023-03-24 RX ADMIN — ACETAMINOPHEN 650 MG: 325 TABLET ORAL at 17:02

## 2023-03-24 RX ADMIN — ACETAMINOPHEN 650 MG: 325 TABLET ORAL at 03:39

## 2023-03-24 RX ADMIN — CEFAZOLIN 2000 MG: 2 INJECTION, POWDER, FOR SOLUTION INTRAMUSCULAR; INTRAVENOUS at 14:39

## 2023-03-24 RX ADMIN — CEFAZOLIN 2000 MG: 2 INJECTION, POWDER, FOR SOLUTION INTRAMUSCULAR; INTRAVENOUS at 03:38

## 2023-03-24 RX ADMIN — CARBIDOPA AND LEVODOPA 2 TABLET: 25; 100 TABLET ORAL at 17:02

## 2023-03-24 ASSESSMENT — PAIN DESCRIPTION - LOCATION: LOCATION: HIP

## 2023-03-24 ASSESSMENT — PAIN SCALES - WONG BAKER: WONGBAKER_NUMERICALRESPONSE: 2

## 2023-03-24 ASSESSMENT — PAIN SCALES - GENERAL
PAINLEVEL_OUTOF10: 0
PAINLEVEL_OUTOF10: 4
PAINLEVEL_OUTOF10: 6

## 2023-03-24 ASSESSMENT — PAIN DESCRIPTION - DESCRIPTORS: DESCRIPTORS: TENDER;SORE;DISCOMFORT

## 2023-03-24 ASSESSMENT — PAIN - FUNCTIONAL ASSESSMENT: PAIN_FUNCTIONAL_ASSESSMENT: PREVENTS OR INTERFERES SOME ACTIVE ACTIVITIES AND ADLS

## 2023-03-24 ASSESSMENT — PAIN DESCRIPTION - ORIENTATION: ORIENTATION: LEFT

## 2023-03-24 NOTE — PROGRESS NOTES
The patient has a deep brain stimulator due to Parkinson's that is located in the patient's left upper chest. The patient's spouse and POA is at the bedside. She verbalized concerns over the patient being defibrillated. The representative for the company was contacted by the CRNA, Yvette Rodríguez and also the surgical charge nurse, Mahin Berrios. The representative explained that if the patient were to be defibrillated it may cause the stimulator to be nonfunctioning and a surgery may be needed to remove and replace the stimulator. Dr. Debbie Tello came yaya the bedside and talked to the family about the urgency of this surgery. The patient and his spouse are in agreement to proceed with this surgery. The patient's spouse turned the stimulator off.

## 2023-03-24 NOTE — PLAN OF CARE
Problem: Discharge Planning  Goal: Discharge to home or other facility with appropriate resources  Outcome: Progressing     Problem: Skin/Tissue Integrity  Goal: Absence of new skin breakdown  Description: 1. Monitor for areas of redness and/or skin breakdown  2. Assess vascular access sites hourly  3. Every 4-6 hours minimum:  Change oxygen saturation probe site  4. Every 4-6 hours:  If on nasal continuous positive airway pressure, respiratory therapy assess nares and determine need for appliance change or resting period.   3/23/2023 2308 by Ej Dexter RN  Outcome: Progressing     Problem: Safety - Adult  Goal: Free from fall injury  3/23/2023 2308 by Ej Dexter RN  Outcome: Progressing     Problem: ABCDS Injury Assessment  Goal: Absence of physical injury  Outcome: Progressing

## 2023-03-24 NOTE — PROGRESS NOTES
normocepalic, without obvious abnormality  NECK:  supple, symmetrical, trachea midline  LUNGS:  no increased work of breathing, no retractions, and clear to auscultation  CARDIOVASCULAR:  normal apical pulses and normal S1 and S2  ABDOMEN:  normal bowel sounds, non-distended, and non-tender  MUSCULOSKELETAL:  full range of motion noted  NEUROLOGIC:  Mental Status Exam:  Level of Alertness:   awake  Orientation:   person, place, time  SKIN:  no bruising or bleeding  Data    CBC:   Lab Results   Component Value Date/Time    WBC 8.8 03/24/2023 04:24 AM    RBC 2.50 03/24/2023 04:24 AM    HGB 7.4 03/24/2023 04:24 AM    HCT 23.8 03/24/2023 04:24 AM    MCV 95.2 03/24/2023 04:24 AM    MCH 29.6 03/24/2023 04:24 AM    MCHC 31.1 03/24/2023 04:24 AM    RDW 12.7 03/24/2023 04:24 AM     03/24/2023 04:24 AM    MPV 11.1 03/24/2023 04:24 AM     BMP:    Lab Results   Component Value Date/Time     03/24/2023 04:24 AM    K 4.1 03/24/2023 04:24 AM     03/24/2023 04:24 AM    CO2 25 03/24/2023 04:24 AM    BUN 14 03/24/2023 04:24 AM    LABALBU 3.5 03/23/2023 09:32 AM    CREATININE 0.7 03/24/2023 04:24 AM    CALCIUM 7.8 03/24/2023 04:24 AM    GFRAA >60 08/31/2020 09:20 AM    LABGLOM >60 03/24/2023 04:24 AM    GLUCOSE 95 03/24/2023 04:24 AM       ASSESSMENT AND PLAN          Rupture of operation wound      Parkinson disease with mild agitation      Anemia: suspected post op loss. Constipation:     Plans: Added dulcolax for constipation  Continue IV hydration at a slow rate  Encourage oral intake  Await for wound culture  PRN benadryl for agitation/anxiety if needed. Discharge planning.

## 2023-03-24 NOTE — CARE COORDINATION
3/24/2023: SS Note/Discharge plan:  Notified by Millie Meraz admissions for [facility] that currently unsure of pt is appropriate for Acute rehab admission & concerned pt may not be safe to return home & will still need SNF placement but will discuss with their therapy team and their cm and re-assess pt on Monday 3/27 for a final determination, pt's wife notified, SNF list provided, prefer [facility] as alternative preference if unable to get into [facility], referral made to [facility] admissions liaison also, NO PRE CERT NEEDED for rehab placement, sw/cm to follow. Electronically signed by JORGE Hussein on 3/24/2023 at 3:04 PM

## 2023-03-24 NOTE — CARE COORDINATION
Left LV for wife Claudia Alegria explaining IMM letter and requested a call back.  Electronically signed by Max Gaviria on 3/24/2023 at 9:27 AM

## 2023-03-24 NOTE — PROGRESS NOTES
Department of Orthopedic Surgery  Resident Progress Note    Patient seen and examined, resting in bed this morning. Patient unable to respond to questions due to baseline status. Per overnight nurse, patient has been fidgety in bed but appropriate leg positioning has been maintained. VITALS:  /73   Pulse 92   Temp 98.5 °F (36.9 °C) (Oral)   Resp 18   Ht 5' 9\" (1.753 m)   Wt 136 lb (61.7 kg)   SpO2 97%   BMI 20.08 kg/m²     General: Awake, confused. In no apparent distress    MUSCULOSKELETAL:   left lower extremity:  Dressing with strikethrough noted but contained within Aquacel dressing  Compartments soft and compressible  Spontaneously moving the ankle and foot this morning  +2/4 DP pulse, Brisk Cap refill, Toes warm and perfused  Unable to assess distal sensation subjectively due to patient's underlying mentation status    CBC:   Lab Results   Component Value Date/Time    WBC 8.8 03/24/2023 04:24 AM    HGB 7.4 03/24/2023 04:24 AM    HCT 23.8 03/24/2023 04:24 AM     03/24/2023 04:24 AM     PT/INR:    Lab Results   Component Value Date/Time    PROTIME 14.1 03/22/2023 09:45 PM    INR 1.2 03/22/2023 09:45 PM         ASSESSMENT  History of left Harjeet hip arthroplasty with postoperative wound dehiscence status post irrigation and debridement with femoral head exchange on 3/23/2023    PLAN      Continue physical therapy and protocol: WBAT - LLE  Continue posterior hip precautions-avoid flexion, internal rotation, adduction of the operative hip  Intraoperative cultures pending  Ancef currently on board postop. Appreciate infectious disease recommendations moving forward  Deep venous thrombosis prophylaxis -aspirin, early mobilization  PT/OT  Pain Control: IV and PO. Wean to p.o. as able  Monitor H&H. Hemoglobin 7.4 this morning. Consider transfusion below 7.0  D/C Planning.   Appreciate medicine and infectious disease comanagement  Please call with questions or concerns

## 2023-03-24 NOTE — CARE COORDINATION
3/24/2023: SS Note/Discharge plan:  SS Consult noted for \"discharge planning\", met with pt's wife, Anine Daily at her 's bedside, pt sleeping, PT/OT ordered but unable to work with pt this morning due to pt having some post-op lethargy, confusion & agitation during the night, per nursing. Pt's wife does still prefer to bring her  home when medically discharged with [facility], referral made to [facility]  liaison who will follow for home care orders, says his sister did  his belongings at [facility] and facility liaison notified of pt's d/c plan. Sw will await PT/OT evals and recommendations to confirm any dme needs and possible ambulance vs ambulette transport home, 1-2 steps to enter, Nursing informed, sw/cm to follow. Electronically signed by JORGE Blackburn on 3/24/2023 at 11:59 AM

## 2023-03-24 NOTE — CARE COORDINATION
3/24/2023: SS Note/Discharge plan:  PT/OT worked with pt this afternoon and are recommending Acute rehab, pt's wife present in room for therapy evals and agreeable now to temporary rehab placement & prefers he go back to [facility], referral made to [facility], admissions liaison and awaiting chart review and acceptance, nursing notified, sw/cm to follow.  Electronically signed by JORGE Mcgowan on 3/24/2023 at 2:31 PM

## 2023-03-24 NOTE — PROGRESS NOTES
additionally includes thorough review of current medical information, gathering information on past medical history/social history and prior level of function, interpretation of standardized testing/informal observation of tasks, assessment of data and development of plan of care and goals.         Evaluating OT: Arianna Torres OTR/L; [id number]

## 2023-03-24 NOTE — HOME CARE
[address] [facility] referral received. Spoke with patient's wife Aubrie Newman, demographics verified. Plan to see after discharge. Herb Theodore, MARLENAN [address] [facility].

## 2023-03-24 NOTE — ANESTHESIA POSTPROCEDURE EVALUATION
Department of Anesthesiology  Postprocedure Note    Patient: Angie Cruz  MRN: [id number]  YOB: 1950  Date of evaluation: 3/23/2023      Procedure Summary     Date: 03/23/23 Room / Location: [address] [place] [address]    Anesthesia Start: 1737 Anesthesia Stop: 1928    Procedure: LEFT HIP  IRRIGATION AND DEBRIDEMENT WITH POSSIBLE EXCHANGE OF FEMORAL HEAD COMPONENT (Left) Diagnosis:       Surgical site infection      (Surgical site infection Mirnaaparna Harden)    Surgeons: Mary Acosta DO Responsible Provider: Linnea Morgan MD    Anesthesia Type: general ASA Status: 3          Anesthesia Type: No value filed.     Malcolm Phase I: Malcolm Score: 9    Malcolm Phase II:        Anesthesia Post Evaluation    Patient location during evaluation: PACU  Patient participation: complete - patient participated  Level of consciousness: awake and alert  Pain score: 3  Airway patency: patent  Nausea & Vomiting: no nausea  Complications: no  Cardiovascular status: blood pressure returned to baseline  Respiratory status: acceptable  Hydration status: euvolemic

## 2023-03-24 NOTE — PROGRESS NOTES
Physical Therapy    Room #: 0323/0323-01  Date: 3/24/2023       Patient Name: Gonzales Juares  : 1950      MRN: [id number]     Patient unavailable for physical therapy eval due to unavailable/not appropriate per nursing due to restless night, pain, confusion, agitation . Will attempt PT evaluation at a later time. Thank you.        Patience Nj, PT

## 2023-03-25 LAB
BLOOD BANK DISPENSE STATUS: NORMAL
BLOOD BANK PRODUCT CODE: NORMAL
BPU ID: NORMAL
DESCRIPTION BLOOD BANK: NORMAL
ERYTHROCYTE [DISTWIDTH] IN BLOOD BY AUTOMATED COUNT: 13 FL (ref 11.5–15)
GRAM STAIN ORDERABLE: NORMAL
HCT VFR BLD AUTO: 20.5 % (ref 37–54)
HCT VFR BLD AUTO: 22.8 % (ref 37–54)
HGB BLD-MCNC: 6.6 G/DL (ref 12.5–16.5)
HGB BLD-MCNC: 7.6 G/DL (ref 12.5–16.5)
MCH RBC QN AUTO: 30.1 PG (ref 26–35)
MCHC RBC AUTO-ENTMCNC: 32.2 % (ref 32–34.5)
MCV RBC AUTO: 93.6 FL (ref 80–99.9)
PLATELET # BLD AUTO: 182 E9/L (ref 130–450)
PMV BLD AUTO: 11.1 FL (ref 7–12)
PROCALCITONIN: 0.11 NG/ML (ref 0–0.08)
RBC # BLD AUTO: 2.19 E12/L (ref 3.8–5.8)
WBC # BLD: 6.8 E9/L (ref 4.5–11.5)

## 2023-03-25 PROCEDURE — 85014 HEMATOCRIT: CPT

## 2023-03-25 PROCEDURE — 36415 COLL VENOUS BLD VENIPUNCTURE: CPT

## 2023-03-25 PROCEDURE — 2580000003 HC RX 258

## 2023-03-25 PROCEDURE — 6370000000 HC RX 637 (ALT 250 FOR IP): Performed by: SPECIALIST

## 2023-03-25 PROCEDURE — 6370000000 HC RX 637 (ALT 250 FOR IP): Performed by: ORTHOPAEDIC SURGERY

## 2023-03-25 PROCEDURE — 97110 THERAPEUTIC EXERCISES: CPT

## 2023-03-25 PROCEDURE — 85027 COMPLETE CBC AUTOMATED: CPT

## 2023-03-25 PROCEDURE — 1200000000 HC SEMI PRIVATE

## 2023-03-25 PROCEDURE — 85018 HEMOGLOBIN: CPT

## 2023-03-25 PROCEDURE — 84145 PROCALCITONIN (PCT): CPT

## 2023-03-25 PROCEDURE — 36430 TRANSFUSION BLD/BLD COMPNT: CPT

## 2023-03-25 PROCEDURE — 97530 THERAPEUTIC ACTIVITIES: CPT

## 2023-03-25 PROCEDURE — 6370000000 HC RX 637 (ALT 250 FOR IP): Performed by: INTERNAL MEDICINE

## 2023-03-25 PROCEDURE — 99232 SBSQ HOSP IP/OBS MODERATE 35: CPT | Performed by: INTERNAL MEDICINE

## 2023-03-25 RX ORDER — CEPHALEXIN 500 MG/1
500 CAPSULE ORAL EVERY 6 HOURS SCHEDULED
Status: DISCONTINUED | OUTPATIENT
Start: 2023-03-25 | End: 2023-03-28 | Stop reason: HOSPADM

## 2023-03-25 RX ORDER — SODIUM CHLORIDE 9 MG/ML
INJECTION, SOLUTION INTRAVENOUS PRN
Status: COMPLETED | OUTPATIENT
Start: 2023-03-25 | End: 2023-03-25

## 2023-03-25 RX ADMIN — CARBIDOPA AND LEVODOPA 2 TABLET: 25; 100 TABLET ORAL at 16:43

## 2023-03-25 RX ADMIN — ASPIRIN 81 MG: 81 TABLET, COATED ORAL at 20:52

## 2023-03-25 RX ADMIN — BISACODYL 5 MG: 5 TABLET, COATED ORAL at 08:53

## 2023-03-25 RX ADMIN — ACETAMINOPHEN 650 MG: 325 TABLET ORAL at 11:43

## 2023-03-25 RX ADMIN — CEPHALEXIN 500 MG: 500 CAPSULE ORAL at 16:44

## 2023-03-25 RX ADMIN — CARBIDOPA AND LEVODOPA 2 TABLET: 25; 100 TABLET ORAL at 08:53

## 2023-03-25 RX ADMIN — CEPHALEXIN 500 MG: 500 CAPSULE ORAL at 19:03

## 2023-03-25 RX ADMIN — OXYCODONE HYDROCHLORIDE 5 MG: 5 TABLET ORAL at 16:43

## 2023-03-25 RX ADMIN — ASPIRIN 81 MG: 81 TABLET, COATED ORAL at 08:53

## 2023-03-25 RX ADMIN — ACETAMINOPHEN 650 MG: 325 TABLET ORAL at 20:52

## 2023-03-25 RX ADMIN — CARBIDOPA AND LEVODOPA 2 TABLET: 25; 100 TABLET ORAL at 13:37

## 2023-03-25 RX ADMIN — SODIUM CHLORIDE: 9 INJECTION, SOLUTION INTRAVENOUS at 09:12

## 2023-03-25 RX ADMIN — CARBIDOPA AND LEVODOPA 2 TABLET: 25; 100 TABLET ORAL at 20:53

## 2023-03-25 RX ADMIN — DIPHENHYDRAMINE HCL 12.5 MG: 25 TABLET ORAL at 18:05

## 2023-03-25 ASSESSMENT — PAIN DESCRIPTION - LOCATION
LOCATION: HIP
LOCATION: HIP

## 2023-03-25 ASSESSMENT — PAIN SCALES - GENERAL
PAINLEVEL_OUTOF10: 5
PAINLEVEL_OUTOF10: 1
PAINLEVEL_OUTOF10: 1
PAINLEVEL_OUTOF10: 10

## 2023-03-25 ASSESSMENT — PAIN DESCRIPTION - PAIN TYPE: TYPE: ACUTE PAIN;SURGICAL PAIN

## 2023-03-25 ASSESSMENT — PAIN DESCRIPTION - DESCRIPTORS
DESCRIPTORS: PATIENT UNABLE TO DESCRIBE
DESCRIPTORS: ACHING;DULL;DISCOMFORT

## 2023-03-25 ASSESSMENT — PAIN DESCRIPTION - FREQUENCY: FREQUENCY: INTERMITTENT

## 2023-03-25 ASSESSMENT — PAIN DESCRIPTION - ORIENTATION
ORIENTATION: LEFT
ORIENTATION: LEFT

## 2023-03-25 ASSESSMENT — PAIN DESCRIPTION - ONSET: ONSET: GRADUAL

## 2023-03-25 NOTE — PROGRESS NOTES
assessed          ROM Within functional limits  lefth ip within precautions  Increase range of motion 10% of affected joints    Strength BUE:  refer to OT eval  RLE:  3+/5  LLE:  3-/5  Increase strength in affected mm groups by 1/3 grade   Balance Sitting EOB:  fair    Dynamic Standing:  poor left lateral posterior lean wheeled walker assist of 2  Sitting EOB:  fair +  Dynamic Standing: fair wheeled walker      Patient is Alert & Oriented x person and follows one step directions  50%  Sensation:  Patient  not appropriate to assess    Edema:  yes left lower extremity   Endurance: fair       Vitals: room air   Blood Pressure at rest  Blood Pressure during session    Heart Rate at rest   Heart Rate during session     SPO2 at rest  %  SPO2 during session %     Patient education  Patient educated on role of Physical Therapy, risks of immobility, safety and plan of care,  importance of mobility while in hospital , ankle pumps, quad set and glut set for edema control, blood clot prevention, hip precautions, weight bearing status , and positioning for skin integrity and comfort     Patient response to education:   Pt verbalized understanding Pt demonstrated skill Pt requires further education in this area   Yes Partial Yes      Treatment:  Patient practiced and was instructed/facilitated in the following treatment: Patient educated hip precautions, performed exercises, assisted to edge of bed,  Sat edge of bed 5 minutes with Minimal assist of 1 to increase dynamic sitting balance and activity tolerance. Stood with assist of 2, cues for weight shifting, midline and balance. Patient stood initially with +2 HHA, second sit to stand tried walker for gait. Patient attempting to move legs, but not a pattern that allowed for gait.  Returned to bed with pillow between lower extremities to maintain hip precautions,  bed linen adjustment performed while standing     Therapeutic Exercises:  ankle pumps, heel slide, and hip

## 2023-03-25 NOTE — PLAN OF CARE
Problem: Skin/Tissue Integrity  Goal: Absence of new skin breakdown  Description: 1. Monitor for areas of redness and/or skin breakdown  2. Assess vascular access sites hourly  3. Every 4-6 hours minimum:  Change oxygen saturation probe site  4. Every 4-6 hours:  If on nasal continuous positive airway pressure, respiratory therapy assess nares and determine need for appliance change or resting period.   3/24/2023 2002 by Derrick Jain RN  Outcome: Progressing  3/24/2023 1825 by Didi Malone RN  Outcome: Progressing     Problem: Safety - Adult  Goal: Free from fall injury  3/24/2023 2002 by Derrick Jain RN  Outcome: Progressing  3/24/2023 1825 by Didi Malone RN  Outcome: Progressing

## 2023-03-25 NOTE — PROGRESS NOTES
Department of Orthopedic Surgery  Resident Progress Note    Patient seen and examined, resting in bed this morning. Patient unable to respond to questions due to baseline status. Per overnight nurse, patient was somewhat restless, Benadryl helped. VITALS:  /61   Pulse 82   Temp 98.8 °F (37.1 °C) (Axillary)   Resp 16   Ht 5' 9\" (1.753 m)   Wt 136 lb (61.7 kg)   SpO2 93%   BMI 20.08 kg/m²     General: Awake, confused. In no apparent distress    MUSCULOSKELETAL:   left lower extremity:  Dressing with strikethrough noted but contained within Aquacel dressing  Compartments soft and compressible  Spontaneously moving the ankle and foot this morning  +2/4 DP pulse, Brisk Cap refill, Toes warm and perfused  Patient states he does feel sensation to the tibial, peroneal's, sural, saphenous nerve attribution    CBC:   Lab Results   Component Value Date/Time    WBC 6.8 03/25/2023 04:44 AM    HGB 6.6 03/25/2023 04:44 AM    HCT 20.5 03/25/2023 04:44 AM     03/25/2023 04:44 AM     PT/INR:    Lab Results   Component Value Date/Time    PROTIME 14.1 03/22/2023 09:45 PM    INR 1.2 03/22/2023 09:45 PM         ASSESSMENT  History of left Harjeet hip arthroplasty with postoperative wound dehiscence status post irrigation and debridement with femoral head exchange on 3/23/2023    PLAN      Continue physical therapy and protocol: WBAT - LLE  Continue posterior hip precautions-avoid flexion, internal rotation, adduction of the operative hip  Intraoperative cultures pending  Ancef currently on board postop. Appreciate infectious disease recommendations moving forward  Deep venous thrombosis prophylaxis -aspirin, early mobilization  PT/OT  Pain Control: IV and PO. Wean to p.o. as able  Monitor H&H. Hemoglobin 6.6 will transfuse 1 unit  D/C Planning.   Appreciate medicine and infectious disease comanagement  Please call with questions or concerns

## 2023-03-25 NOTE — CONSULTS
[address] [place], [place] [address]ist Group   HISTORY AND PHYSICAL EXAM      AUTHOR: Deonte Nolan MD PATIENT NAME: Meaghan Moore   PCP: None Provider  MRN: [id number], : 1950       CHIEF COMPLAINT / REASON FOR ADMISSION: Left hip wound dehiscence  HPI:   This is a 81 y.o. male  has a past medical history of Parkinson's disease ([address] presented with Left hip wound dehiscence  for last few days prior to arrival to the hospital.  He had a left hip hemiarthroplasty on 2023, now wound is open and did not heal.  The patient was seen and examined at bedside, appears alert and awake with no acute distress and is able to answer simple  questions. On direct questioning, patient denied any  resting ongoing chest pain, resting SOB, hemoptysis, productive cough, fever, ongoing palpitation, active abdominal pain, hematemesis, rectal bleeding, torito, hematuria, any other  and GI complaints, and any new focal neuro deficits . ROS:  Pertinent positives and negatives are noted in the HPI, all other systems are reviewed and negative    PMH:  Past Medical History:   Diagnosis Date    Parkinson's disease [facility])        Surgical History:  Past Surgical History:   Procedure Laterality Date    DEEP BRAIN STIMULATOR PLACEMENT      L chest    FRACTURE SURGERY      nose    HIP SURGERY Left 2023    LEFT HIP HEMIARTHROPLASTY (DEPUY) performed by Joe Carey DO at [address] Right 2017    knee    KNEE ARTHROSCOPY Right     TONSILLECTOMY      TOTAL KNEE ARTHROPLASTY Left 2020    TOTAL LEFT KNEE REPLACEMENT/ ARTHROPLASTY (JOLENE) performed by Joe Carey DO at [address]       Medications Prior to Admission:    Prior to Admission medications    Medication Sig Start Date End Date Taking?  Authorizing Provider   aspirin 81 MG EC tablet Take 81 mg by mouth daily   Yes Historical Provider, MD   clotrimazole (LOTRIMIN) 1 % cream Apply 1 each topically 2 times daily   Yes Historical
visual disturbance  HEENT:  negative for  hearing loss, voice change  RESPIRATORY:  negative for  dyspnea, wheezing  CARDIOVASCULAR:  negative for  chest pain, palpitations  GASTROINTESTINAL:  negative for nausea, vomiting  GENITOURINARY:  negative for frequency, urinary incontinence  HEMATOLOGIC/LYMPHATIC:  negative for bleeding and petechiae  MUSCULOSKELETAL:  positive for  pain to hip  NEUROLOGICAL:  negative for headaches, dizziness  BEHAVIOR/PSYCH:  negative for increased agitation and anxiety    PHYSICAL EXAM:    VITALS:  BP (!) 143/78   Pulse 63   Temp 98.2 °F (36.8 °C) (Oral)   Resp 16   SpO2 98%   CONSTITUTIONAL:  awake, alert, cooperative, no apparent distress, and appears stated age  General appearance:  No apparent distress, appears stated age. HEENT:  Normal cephalic, atraumatic without obvious deformity. Pupils equal, round, and reactive to light. Extra ocular muscles intact. Conjunctivae/corneas clear. Neck: Supple, with full range of motion. No jugular venous distention. Trachea midline. Respiratory:  Normal respiratory effort. Clear to auscultation,   Cardiovascular:  RRR  Abdomen: Soft, non-tender, non-distended with normal bowel sounds. Skin: Please see exam below  Neurologic:   Cranial nerves: II-XII intact,   Psychiatric:  Alert and oriented x 3   MUSCULOSKELETAL:  Left lower Extremity:  Patient has lateral incision of the left hip, from previous left hip hemiarthroplasty, the wound is dehisced on the lower one third aspect of the incision, is actively draining blood, no purulence noted. There is a palpable mass about the dehiscence, likely hematoma.   Erythema noted about the incision edges,  +TTP lateral about the left hip   compartments soft and compressible  Palpable dorsalis pedis and posterior tibialis pulse, brisk cap refill to toes, foot warm and perfused  Sensation intact to light touch in sural/deep peroneal/superficial peroneal/saphenous/posterior tibial nerve distributions
x-ray 02/28/2023 HISTORY: ORDERING SYSTEM PROVIDED HISTORY: post op pacu portable TECHNOLOGIST PROVIDED HISTORY: Of operative side while in recovery room Reason for exam:->post op pacu portable FINDINGS: SI joints symmetric without widening. No displaced pelvic ring fracture. Status post left total hip arthroplasty in place with anatomic alignment. Expected postsurgical changes the adjacent soft tissues     Status post left total hip arthroplasty in anatomic alignment. LABS  Recent Labs     03/23/23  0932 03/24/23  0424 03/25/23  0444 03/25/23  1046   WBC 6.0 8.8 6.8  --    HGB 8.6* 7.4* 6.6* 7.6*   HCT 26.1* 23.8* 20.5* 22.8*   MCV 93.9 95.2 93.6  --     223 182  --      Recent Labs     03/22/23  1658 03/23/23  0932 03/24/23  0424    140 137   K 3.7 4.2 4.1    107 105   CO2 29 26 25   BUN 15 16 14   CREATININE 0.7 0.6* 0.7   LABGLOM >60 >60 >60   GLUCOSE 106* 102* 95   PROT 6.1* 5.6*  --    LABALBU 3.8 3.5  --    CALCIUM 8.7 8.4* 7.8*   BILITOT 0.4 0.4  --    ALKPHOS 68 61  --    AST 10 9  --    ALT <5 <5  --      No results for input(s): PROCAL in the last 72 hours.   Lab Results   Component Value Date    CRP 0.4 03/22/2023     Lab Results   Component Value Date    SEDRATE 15 03/22/2023     No results found for: [id number]  Lab Results   Component Value Date/Time    COVID19 NOT DETECTED 03/02/2023 12:45 PM        Lab Results   Component Value Date/Time    COVID19 NOT DETECTED 03/02/2023 12:45 PM     COVID-19/QUETA-COV2 LABS  Recent Labs     03/22/23  1530 03/22/23  1658 03/22/23  2145 03/23/23  0932   CRP 0.4  --   --   --    INR  --  1.2 1.2  --    PROTIME  --  14.0* 14.1*  --    AST  --  10  --  9   ALT  --  <5  --  <5          MICROBIOLOGY:          WOUND/ABSCESS   Date Value Ref Range Status   05/16/2017 Growth not present  Final        Culture Surgical   Date Value Ref Range Status   03/23/2023 Growth not present, incubation continues  Preliminary   03/23/2023 Growth not present,

## 2023-03-25 NOTE — PROGRESS NOTES
Department of Internal Medicine  General Internal Medicine  Attending Progress Note  Chief Complaint   Patient presents with    Other     Dehisced left hip surgical incision. SUBJECTIVE:    Patient's more calm today. No fever or chills.     OBJECTIVE      Medications    Current Facility-Administered Medications: cephALEXin (KEFLEX) capsule 500 mg, 500 mg, Oral, 4 times per day  bisacodyl (DULCOLAX) EC tablet 5 mg, 5 mg, Oral, Daily  diphenhydrAMINE (BENADRYL) tablet 12.5 mg, 12.5 mg, Oral, Q4H PRN  sodium chloride flush 0.9 % injection 5-40 mL, 5-40 mL, IntraVENous, 2 times per day  sodium chloride flush 0.9 % injection 5-40 mL, 5-40 mL, IntraVENous, PRN  0.9 % sodium chloride infusion, , IntraVENous, PRN  acetaminophen (TYLENOL) tablet 650 mg, 650 mg, Oral, Q6H  oxyCODONE (ROXICODONE) immediate release tablet 5 mg, 5 mg, Oral, Q6H PRN **OR** oxyCODONE (ROXICODONE) immediate release tablet 10 mg, 10 mg, Oral, Q6H PRN  morphine (PF) injection 2 mg, 2 mg, IntraVENous, Q4H PRN **OR** morphine injection 4 mg, 4 mg, IntraVENous, Q4H PRN  magnesium hydroxide (MILK OF MAGNESIA) 400 MG/5ML suspension 30 mL, 30 mL, Oral, Daily PRN  polyethylene glycol (GLYCOLAX) packet 17 g, 17 g, Oral, Daily PRN  ondansetron (ZOFRAN-ODT) disintegrating tablet 4 mg, 4 mg, Oral, Q8H PRN **OR** ondansetron (ZOFRAN) injection 4 mg, 4 mg, IntraVENous, Q6H PRN  aspirin EC tablet 81 mg, 81 mg, Oral, BID  carbidopa-levodopa (SINEMET)  MG per tablet 2 tablet, 2 tablet, Oral, 4x Daily  Physical    VITALS:  /71   Pulse 81   Temp 99 °F (37.2 °C) (Oral)   Resp 18   Ht 5' 9\" (1.753 m)   Wt 136 lb (61.7 kg)   SpO2 100%   BMI 20.08 kg/m²   CONSTITUTIONAL:  awake, cooperative, and no apparent distress  EYES:  extra-ocular muscles intact and vision intact  ENT:  normocepalic, without obvious abnormality  NECK:  supple, symmetrical, trachea midline  LUNGS:  no increased work of breathing, no retractions, and clear to

## 2023-03-26 LAB
BASOPHILS # BLD: 0.02 E9/L (ref 0–0.2)
BASOPHILS NFR BLD: 0.4 % (ref 0–2)
EOSINOPHIL # BLD: 0.46 E9/L (ref 0.05–0.5)
EOSINOPHIL NFR BLD: 8.1 % (ref 0–6)
ERYTHROCYTE [DISTWIDTH] IN BLOOD BY AUTOMATED COUNT: 13.1 FL (ref 11.5–15)
ERYTHROCYTE [SEDIMENTATION RATE] IN BLOOD BY WESTERGREN METHOD: 14 MM/HR (ref 0–15)
HCT VFR BLD AUTO: 23.1 % (ref 37–54)
HGB BLD-MCNC: 7.4 G/DL (ref 12.5–16.5)
IMM GRANULOCYTES # BLD: 0.02 E9/L
IMM GRANULOCYTES NFR BLD: 0.4 % (ref 0–5)
LYMPHOCYTES # BLD: 0.83 E9/L (ref 1.5–4)
LYMPHOCYTES NFR BLD: 14.6 % (ref 20–42)
MCH RBC QN AUTO: 30 PG (ref 26–35)
MCHC RBC AUTO-ENTMCNC: 32 % (ref 32–34.5)
MCV RBC AUTO: 93.5 FL (ref 80–99.9)
MONOCYTES # BLD: 0.63 E9/L (ref 0.1–0.95)
MONOCYTES NFR BLD: 11.1 % (ref 2–12)
NEUTROPHILS # BLD: 3.71 E9/L (ref 1.8–7.3)
NEUTS SEG NFR BLD: 65.4 % (ref 43–80)
PLATELET # BLD AUTO: 185 E9/L (ref 130–450)
PMV BLD AUTO: 11.9 FL (ref 7–12)
RBC # BLD AUTO: 2.47 E12/L (ref 3.8–5.8)
WBC # BLD: 5.7 E9/L (ref 4.5–11.5)

## 2023-03-26 PROCEDURE — 6370000000 HC RX 637 (ALT 250 FOR IP): Performed by: SPECIALIST

## 2023-03-26 PROCEDURE — 85025 COMPLETE CBC W/AUTO DIFF WBC: CPT

## 2023-03-26 PROCEDURE — 97530 THERAPEUTIC ACTIVITIES: CPT

## 2023-03-26 PROCEDURE — 6370000000 HC RX 637 (ALT 250 FOR IP): Performed by: ORTHOPAEDIC SURGERY

## 2023-03-26 PROCEDURE — 85651 RBC SED RATE NONAUTOMATED: CPT

## 2023-03-26 PROCEDURE — 1200000000 HC SEMI PRIVATE

## 2023-03-26 PROCEDURE — 36415 COLL VENOUS BLD VENIPUNCTURE: CPT

## 2023-03-26 PROCEDURE — 99232 SBSQ HOSP IP/OBS MODERATE 35: CPT | Performed by: INTERNAL MEDICINE

## 2023-03-26 PROCEDURE — 6370000000 HC RX 637 (ALT 250 FOR IP): Performed by: INTERNAL MEDICINE

## 2023-03-26 PROCEDURE — 97110 THERAPEUTIC EXERCISES: CPT

## 2023-03-26 RX ORDER — CEPHALEXIN 500 MG/1
500 CAPSULE ORAL 4 TIMES DAILY
Qty: 28 CAPSULE | Refills: 0 | Status: SHIPPED | OUTPATIENT
Start: 2023-03-26 | End: 2023-04-02

## 2023-03-26 RX ADMIN — ACETAMINOPHEN 650 MG: 325 TABLET ORAL at 10:33

## 2023-03-26 RX ADMIN — CEPHALEXIN 500 MG: 500 CAPSULE ORAL at 18:06

## 2023-03-26 RX ADMIN — ACETAMINOPHEN 650 MG: 325 TABLET ORAL at 04:34

## 2023-03-26 RX ADMIN — CARBIDOPA AND LEVODOPA 2 TABLET: 25; 100 TABLET ORAL at 14:04

## 2023-03-26 RX ADMIN — CARBIDOPA AND LEVODOPA 2 TABLET: 25; 100 TABLET ORAL at 20:40

## 2023-03-26 RX ADMIN — ACETAMINOPHEN 650 MG: 325 TABLET ORAL at 20:40

## 2023-03-26 RX ADMIN — CARBIDOPA AND LEVODOPA 2 TABLET: 25; 100 TABLET ORAL at 16:11

## 2023-03-26 RX ADMIN — CEPHALEXIN 500 MG: 500 CAPSULE ORAL at 00:48

## 2023-03-26 RX ADMIN — BISACODYL 5 MG: 5 TABLET, COATED ORAL at 10:33

## 2023-03-26 RX ADMIN — MAGNESIUM HYDROXIDE 30 ML: 400 SUSPENSION ORAL at 10:34

## 2023-03-26 RX ADMIN — CEPHALEXIN 500 MG: 500 CAPSULE ORAL at 11:26

## 2023-03-26 RX ADMIN — ACETAMINOPHEN 650 MG: 325 TABLET ORAL at 16:11

## 2023-03-26 RX ADMIN — ASPIRIN 81 MG: 81 TABLET, COATED ORAL at 20:40

## 2023-03-26 RX ADMIN — CARBIDOPA AND LEVODOPA 2 TABLET: 25; 100 TABLET ORAL at 10:33

## 2023-03-26 RX ADMIN — ASPIRIN 81 MG: 81 TABLET, COATED ORAL at 10:33

## 2023-03-26 RX ADMIN — CEPHALEXIN 500 MG: 500 CAPSULE ORAL at 05:49

## 2023-03-26 ASSESSMENT — PAIN SCALES - GENERAL
PAINLEVEL_OUTOF10: 1
PAINLEVEL_OUTOF10: 0
PAINLEVEL_OUTOF10: 2
PAINLEVEL_OUTOF10: 0
PAINLEVEL_OUTOF10: 0
PAINLEVEL_OUTOF10: 2
PAINLEVEL_OUTOF10: 2
PAINLEVEL_OUTOF10: 3
PAINLEVEL_OUTOF10: 3

## 2023-03-26 ASSESSMENT — PAIN DESCRIPTION - DESCRIPTORS
DESCRIPTORS: ACHING;DISCOMFORT;SORE
DESCRIPTORS: SORE

## 2023-03-26 ASSESSMENT — PAIN DESCRIPTION - LOCATION
LOCATION: HIP

## 2023-03-26 ASSESSMENT — PAIN - FUNCTIONAL ASSESSMENT
PAIN_FUNCTIONAL_ASSESSMENT: PREVENTS OR INTERFERES SOME ACTIVE ACTIVITIES AND ADLS

## 2023-03-26 ASSESSMENT — PAIN DESCRIPTION - ORIENTATION
ORIENTATION: LEFT

## 2023-03-26 ASSESSMENT — PAIN DESCRIPTION - FREQUENCY: FREQUENCY: INTERMITTENT

## 2023-03-26 ASSESSMENT — PAIN SCALES - WONG BAKER: WONGBAKER_NUMERICALRESPONSE: 0

## 2023-03-26 ASSESSMENT — PAIN DESCRIPTION - PAIN TYPE
TYPE: ACUTE PAIN;SURGICAL PAIN
TYPE: ACUTE PAIN;SURGICAL PAIN

## 2023-03-26 ASSESSMENT — PAIN DESCRIPTION - ONSET
ONSET: GRADUAL
ONSET: GRADUAL

## 2023-03-26 NOTE — PROGRESS NOTES
Knee high yao hose applied, pt would not keep thigh high teds on, he was restless c/o too hot and uncomfortable. Tolerating knee highs better. Family at bedside.

## 2023-03-26 NOTE — PROGRESS NOTES
bed.    Supervision    Bathing Maximal Assist  N/T    Minimal Assist    Toileting Dependent   N/T    Minimal Assist    Bed Mobility  Supine to sit: Moderate Assist   Sit to supine: Moderate Assist x 2   Rolling: Moderate Assist   Supine to sit: Maximal Assist x 2  Sit to supine: Maximal Assist x 2   Rolling: Maximal Assist x 2 toward left for placement of wedges     Supine to sit: Supervision   Sit to supine: Supervision   Rolling:Supervision     Functional Transfers Maximal Assist x 2 from EOB to wheeled walker, poor adherence to NWB: L LE so pt was returned to EOB immediately. Transfer training with verbal cues for hand placement throughout session to improve safety. Maximal Assist x 2 from EOB with use of chux as sling. B knee block provided for safety. Patient with flexed trunk and knees; unable to achieve full stand. Minimal Assist    Functional Mobility N/T as pt could not maintain NWB: L LE at this time N/A  Minimal Assist    Balance Sitting:     Static: fair plus at EOB    Dynamic: fair minus at EOB during grooming, posterior lean when fatigued. Standing: poor with wheeled walker Sitting:     Static: fair minus/poor at EOB, left trunk lean     Dynamic: fair minus/poor  Standing: poor  Sitting:     Static: good     Dynamic: good   Standing: good  with wheeled walker   Activity Tolerance fair Fair minus ; EOB tolerance up to x 10 mins. Pt with difficulty keeping eyes open but had brief moments of alertness. good    Visual/  Perceptual Glasses: no             Comments: RN cleared patient for OT. Upon arrival to the room the patient was supine. At end of the session, the patient was supine and repositioned with wedges, alarm on, sister in room. Call light and phone within reach. Pt required verbal cues and instruction as noted above for safe facilitation and completion of tasks. Therapist provided skilled monitoring of the patient's response during treatment session.  Prior to and at the end of

## 2023-03-26 NOTE — PROGRESS NOTES
VIEWS)    Result Date: 2/27/2023  EXAMINATION: TWO XRAY VIEWS OF THE LEFT KNEE 2/27/2023 1:49 pm COMPARISON: None. HISTORY: ORDERING SYSTEM PROVIDED HISTORY: fall, pain TECHNOLOGIST PROVIDED HISTORY: Reason for exam:->fall, pain FINDINGS: Total knee prosthesis in anatomic alignment. No acute fractures or prosthetic complications. There is diffuse anterior soft tissue swelling. No acute bony abnormalities or prosthetic complications. CT HEAD WO CONTRAST    Result Date: 2/27/2023  EXAMINATION: CT OF THE HEAD WITHOUT CONTRAST  2/27/2023 2:21 pm TECHNIQUE: CT of the head was performed without the administration of intravenous contrast. Automated exposure control, iterative reconstruction, and/or weight based adjustment of the mA/kV was utilized to reduce the radiation dose to as low as reasonably achievable. COMPARISON: None. HISTORY: ORDERING SYSTEM PROVIDED HISTORY: fall, headache dizziness TECHNOLOGIST PROVIDED HISTORY: Reason for exam:->fall, headache dizziness Has a \"code stroke\" or \"stroke alert\" been called? ->No Decision Support Exception - unselect if not a suspected or confirmed emergency medical condition->Emergency Medical Condition (MA) FINDINGS: BRAIN/VENTRICLES: There is no acute intracranial hemorrhage, mass effect or midline shift. No abnormal extra-axial fluid collection. The gray-white differentiation is maintained without evidence of an acute infarct. There is no evidence of hydrocephalus. Bilateral neurostimulator devices are noted extending into the anterior thalamus. No evidence of complication. Mild age related cortical atrophy bilaterally. 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 ORBITS: The visualized portion of the orbits demonstrate no acute abnormality. SINUSES: The visualized paranasal sinuses and mastoid air cells demonstrate no acute abnormality. SOFT TISSUES/SKULL:  No acute abnormality of the visualized skull or soft tissues.      1.  Bilateral thalamic

## 2023-03-26 NOTE — PROGRESS NOTES
Department of Internal Medicine  General Internal Medicine  Attending Progress Note  Chief Complaint   Patient presents with    Other     Dehisced left hip surgical incision. SUBJECTIVE:    Wife at bed side. Noted that patient is more calm today. No fever and chills.  No chest pain    OBJECTIVE      Medications    Current Facility-Administered Medications: cephALEXin (KEFLEX) capsule 500 mg, 500 mg, Oral, 4 times per day  bisacodyl (DULCOLAX) EC tablet 5 mg, 5 mg, Oral, Daily  diphenhydrAMINE (BENADRYL) tablet 12.5 mg, 12.5 mg, Oral, Q4H PRN  sodium chloride flush 0.9 % injection 5-40 mL, 5-40 mL, IntraVENous, 2 times per day  sodium chloride flush 0.9 % injection 5-40 mL, 5-40 mL, IntraVENous, PRN  0.9 % sodium chloride infusion, , IntraVENous, PRN  acetaminophen (TYLENOL) tablet 650 mg, 650 mg, Oral, Q6H  oxyCODONE (ROXICODONE) immediate release tablet 5 mg, 5 mg, Oral, Q6H PRN **OR** oxyCODONE (ROXICODONE) immediate release tablet 10 mg, 10 mg, Oral, Q6H PRN  morphine (PF) injection 2 mg, 2 mg, IntraVENous, Q4H PRN **OR** morphine injection 4 mg, 4 mg, IntraVENous, Q4H PRN  magnesium hydroxide (MILK OF MAGNESIA) 400 MG/5ML suspension 30 mL, 30 mL, Oral, Daily PRN  polyethylene glycol (GLYCOLAX) packet 17 g, 17 g, Oral, Daily PRN  ondansetron (ZOFRAN-ODT) disintegrating tablet 4 mg, 4 mg, Oral, Q8H PRN **OR** ondansetron (ZOFRAN) injection 4 mg, 4 mg, IntraVENous, Q6H PRN  aspirin EC tablet 81 mg, 81 mg, Oral, BID  carbidopa-levodopa (SINEMET)  MG per tablet 2 tablet, 2 tablet, Oral, 4x Daily  Physical    VITALS:  BP (!) 109/58   Pulse 65   Temp 98.2 °F (36.8 °C) (Oral)   Resp 18   Ht 5' 9\" (1.753 m)   Wt 136 lb (61.7 kg)   SpO2 94%   BMI 20.08 kg/m²   CONSTITUTIONAL:  awake, cooperative, and no apparent distress  EYES:  extra-ocular muscles intact  ENT:  normocepalic, without obvious abnormality  NECK:  supple, symmetrical, trachea midline  HEMATOLOGIC/LYMPHATICS:  no cervical

## 2023-03-26 NOTE — PROGRESS NOTES
able  Monitor H&H. Posttransfusion hemoglobin 7.6. Awaiting morning value today  D/C Planning. Appreciate medicine and infectious disease co-management.   Anticipate discharge to nursing facility when able  Please call with questions or concerns

## 2023-03-26 NOTE — PROGRESS NOTES
open. Patient requires continued skilled physical therapy to address concerns listed above for increased safety and function at discharge. PHYSICAL THERAPY  PLAN OF CARE       Physical therapy plan of care is established based on physician order,  patient diagnosis and clinical assessment    Current Treatment Recommendations:    -Bed Mobility: Lower extremity exercises , Upper extremity exercises , and Trunk control activities   -Sitting Balance: Hands on support to maintain midline , Facilitate active trunk muscle engagement , Facilitate postural control in all planes , and Engage in core activities to allow for movement within base of support   -Standing Balance: Perform strengthening exercises in standing to promote motor control with or without upper extremity support  and Instruct patient on adequate base of support to maintain balance  -Transfers: Provide instruction on proper hand and foot position for adequate transfer of weight onto lower extremities and use of gait device if needed, Cues for hand placement, technique and safety. Provide stabilization to prevent fall , and Facilitate weight shift forward on to lower extremities and provide necessary stabilization of bilateral lower extremities   -Gait: Gait training, Standing activities to improve: base of support, weight shift, weight bearing , and Pregait training to emphasize: Sequencing , Base of support, Weight shift, Device control, Upright, and Safety   -Endurance: Utilize Supervised activities to increase level of endurance to allow for safe functional mobility including transfers and gait     PT long term treatment goals are located in below grid    Patient and or family understand(s) diagnosis, prognosis, and plan of care. Frequency of treatments: Patient will be seen  daily.          Prior Level of Function: Patient ambulated with wheeled walker and assist  Rehab Potential: good   for baseline    Past medical history:   Past Medical

## 2023-03-26 NOTE — PLAN OF CARE
Problem: Skin/Tissue Integrity  Goal: Absence of new skin breakdown  Description: 1. Monitor for areas of redness and/or skin breakdown  2. Assess vascular access sites hourly  3. Every 4-6 hours minimum:  Change oxygen saturation probe site  4. Every 4-6 hours:  If on nasal continuous positive airway pressure, respiratory therapy assess nares and determine need for appliance change or resting period.   3/25/2023 2236 by Cruzita Duverney, RN  Outcome: Progressing     Problem: Safety - Adult  Goal: Free from fall injury  3/25/2023 2236 by Cruzita Duverney, RN  Outcome: Progressing     Problem: ABCDS Injury Assessment  Goal: Absence of physical injury  3/25/2023 2236 by Cruzita Duverney, RN  Outcome: Progressing     Problem: Pain  Goal: Verbalizes/displays adequate comfort level or baseline comfort level  3/25/2023 2236 by Cruzita Duverney, RN  Outcome: Progressing

## 2023-03-27 LAB
BACTERIA SPEC AEROBE CULT: NORMAL
BASOPHILS # BLD: 0.03 E9/L (ref 0–0.2)
BASOPHILS NFR BLD: 0.7 % (ref 0–2)
EOSINOPHIL # BLD: 0.5 E9/L (ref 0.05–0.5)
EOSINOPHIL NFR BLD: 10.9 % (ref 0–6)
ERYTHROCYTE [DISTWIDTH] IN BLOOD BY AUTOMATED COUNT: 13 FL (ref 11.5–15)
HCT VFR BLD AUTO: 23.3 % (ref 37–54)
HGB BLD-MCNC: 7.6 G/DL (ref 12.5–16.5)
IMM GRANULOCYTES # BLD: 0.02 E9/L
IMM GRANULOCYTES NFR BLD: 0.4 % (ref 0–5)
LYMPHOCYTES # BLD: 0.85 E9/L (ref 1.5–4)
LYMPHOCYTES NFR BLD: 18.6 % (ref 20–42)
MCH RBC QN AUTO: 30.3 PG (ref 26–35)
MCHC RBC AUTO-ENTMCNC: 32.6 % (ref 32–34.5)
MCV RBC AUTO: 92.8 FL (ref 80–99.9)
MONOCYTES # BLD: 0.36 E9/L (ref 0.1–0.95)
MONOCYTES NFR BLD: 7.9 % (ref 2–12)
NEUTROPHILS # BLD: 2.82 E9/L (ref 1.8–7.3)
NEUTS SEG NFR BLD: 61.5 % (ref 43–80)
PLATELET # BLD AUTO: 190 E9/L (ref 130–450)
PMV BLD AUTO: 11 FL (ref 7–12)
RBC # BLD AUTO: 2.51 E12/L (ref 3.8–5.8)
WBC # BLD: 4.6 E9/L (ref 4.5–11.5)

## 2023-03-27 PROCEDURE — 36415 COLL VENOUS BLD VENIPUNCTURE: CPT

## 2023-03-27 PROCEDURE — 6370000000 HC RX 637 (ALT 250 FOR IP): Performed by: ORTHOPAEDIC SURGERY

## 2023-03-27 PROCEDURE — 6370000000 HC RX 637 (ALT 250 FOR IP): Performed by: INTERNAL MEDICINE

## 2023-03-27 PROCEDURE — 97530 THERAPEUTIC ACTIVITIES: CPT

## 2023-03-27 PROCEDURE — 1200000000 HC SEMI PRIVATE

## 2023-03-27 PROCEDURE — 97110 THERAPEUTIC EXERCISES: CPT

## 2023-03-27 PROCEDURE — 6360000002 HC RX W HCPCS: Performed by: INTERNAL MEDICINE

## 2023-03-27 PROCEDURE — 6370000000 HC RX 637 (ALT 250 FOR IP): Performed by: SPECIALIST

## 2023-03-27 PROCEDURE — 99232 SBSQ HOSP IP/OBS MODERATE 35: CPT | Performed by: INTERNAL MEDICINE

## 2023-03-27 PROCEDURE — 85025 COMPLETE CBC W/AUTO DIFF WBC: CPT

## 2023-03-27 RX ORDER — DIPHENHYDRAMINE HYDROCHLORIDE 50 MG/ML
50 INJECTION INTRAMUSCULAR; INTRAVENOUS EVERY 6 HOURS PRN
Status: DISCONTINUED | OUTPATIENT
Start: 2023-03-27 | End: 2023-03-28 | Stop reason: HOSPADM

## 2023-03-27 RX ADMIN — ACETAMINOPHEN 650 MG: 325 TABLET ORAL at 16:45

## 2023-03-27 RX ADMIN — CEPHALEXIN 500 MG: 500 CAPSULE ORAL at 12:33

## 2023-03-27 RX ADMIN — CEPHALEXIN 500 MG: 500 CAPSULE ORAL at 00:45

## 2023-03-27 RX ADMIN — BISACODYL 5 MG: 5 TABLET, COATED ORAL at 10:01

## 2023-03-27 RX ADMIN — CEPHALEXIN 500 MG: 500 CAPSULE ORAL at 17:47

## 2023-03-27 RX ADMIN — CARBIDOPA AND LEVODOPA 2 TABLET: 25; 100 TABLET ORAL at 10:00

## 2023-03-27 RX ADMIN — CARBIDOPA AND LEVODOPA 2 TABLET: 25; 100 TABLET ORAL at 16:47

## 2023-03-27 RX ADMIN — DIPHENHYDRAMINE HYDROCHLORIDE 50 MG: 50 INJECTION, SOLUTION INTRAMUSCULAR; INTRAVENOUS at 21:06

## 2023-03-27 RX ADMIN — CARBIDOPA AND LEVODOPA 2 TABLET: 25; 100 TABLET ORAL at 13:34

## 2023-03-27 RX ADMIN — CEPHALEXIN 500 MG: 500 CAPSULE ORAL at 06:04

## 2023-03-27 RX ADMIN — ACETAMINOPHEN 650 MG: 325 TABLET ORAL at 10:07

## 2023-03-27 RX ADMIN — ASPIRIN 81 MG: 81 TABLET, COATED ORAL at 10:00

## 2023-03-27 ASSESSMENT — PAIN - FUNCTIONAL ASSESSMENT: PAIN_FUNCTIONAL_ASSESSMENT: ACTIVITIES ARE NOT PREVENTED

## 2023-03-27 ASSESSMENT — PAIN SCALES - GENERAL
PAINLEVEL_OUTOF10: 2
PAINLEVEL_OUTOF10: 2

## 2023-03-27 ASSESSMENT — PAIN DESCRIPTION - LOCATION
LOCATION: GENERALIZED
LOCATION: HIP

## 2023-03-27 ASSESSMENT — PAIN DESCRIPTION - DESCRIPTORS: DESCRIPTORS: ACHING;DISCOMFORT

## 2023-03-27 NOTE — PROGRESS NOTES
continues      Narrative:      Source: TISSU       Site: SUPERFICIAL TISSUE LEFT HIP              Culture, Fungus [[id number]] Collected: 03/23/23 1816    Order Status: Sent Specimen: Tissue     Gram Stain [[id number]] Collected: 03/23/23 1816    Order Status: Completed Specimen: Tissue Updated: 03/25/23 0802     Gram Stain Orderable --     Gram stain performed from tissue touch prep  Rare Polymorphonuclear leukocytes  Epithelial cells not seen  No organisms seen      Narrative:      Source: TISSU       Site: SUPERFICIAL TISSUE LEFT HIP              Culture, Surgical [[id number]] Collected: 03/23/23 1816    Order Status: Completed Specimen: Tissue Updated: 03/27/23 0659     Culture Surgical Growth not present    Narrative:      Source: TISSU       Site: SUPERFICIAL TISSUE LEFT HIP              Culture with Smear, Acid Fast Tamiamalia Jose Guadalupe [[id number]] Collected: 03/23/23 1816    Order Status: Sent Specimen: Tissue                   REVIEW OF SYSTEMS     As stated above in the chief complaint, otherwise negative.   CURRENT MEDICATIONS     Current Facility-Administered Medications:     cephALEXin (KEFLEX) capsule 500 mg, 500 mg, Oral, 4 times per day, Demond Pearson MD, 500 mg at 03/27/23 0604    bisacodyl (DULCOLAX) EC tablet 5 mg, 5 mg, Oral, Daily, Becca Best MD, 5 mg at 03/27/23 1001    diphenhydrAMINE (BENADRYL) tablet 12.5 mg, 12.5 mg, Oral, Q4H PRN, Becca Best MD, 12.5 mg at 03/25/23 1805    sodium chloride flush 0.9 % injection 5-40 mL, 5-40 mL, IntraVENous, 2 times per day, Jacqlyn Birmingham, DO, 10 mL at 03/23/23 2225    sodium chloride flush 0.9 % injection 5-40 mL, 5-40 mL, IntraVENous, PRN, Jacqlyn Birmingham, DO, 10 mL at 03/24/23 2356    0.9 % sodium chloride infusion, , IntraVENous, PRN, Jacqlyn Birmingham, DO    acetaminophen (TYLENOL) tablet 650 mg, 650 mg, Oral, Q6H, Juan Luis Zamora, DO, 650 mg at 03/27/23 1007    oxyCODONE (ROXICODONE) immediate release tablet 5 mg, 5 mg, Oral, Q6H PRN, 5 mg at

## 2023-03-27 NOTE — PLAN OF CARE
Problem: Skin/Tissue Integrity  Goal: Absence of new skin breakdown  Description: 1. Monitor for areas of redness and/or skin breakdown  2. Assess vascular access sites hourly  3. Every 4-6 hours minimum:  Change oxygen saturation probe site  4. Every 4-6 hours:  If on nasal continuous positive airway pressure, respiratory therapy assess nares and determine need for appliance change or resting period.   3/27/2023 0104 by Sanjuanita Viveros RN  Outcome: Progressing     Problem: Safety - Adult  Goal: Free from fall injury  3/27/2023 0104 by Sanjuanita Viveros RN  Outcome: Progressing     Problem: ABCDS Injury Assessment  Goal: Absence of physical injury  3/27/2023 0104 by Sanjuanita Viveros RN  Outcome: Progressing     Problem: Pain  Goal: Verbalizes/displays adequate comfort level or baseline comfort level  3/27/2023 0104 by Sanjuanita Viveros RN  Outcome: Progressing

## 2023-03-27 NOTE — PROGRESS NOTES
Department of Orthopedic Surgery  Resident Progress Note    Patient seen and examined, resting in bed this morning. Patient has having some tremors at baseline. Patient is similarly responsive this morning  VITALS:  /68   Pulse 56   Temp 97.9 °F (36.6 °C) (Oral)   Resp 16   Ht 5' 9\" (1.753 m)   Wt 136 lb (61.7 kg)   SpO2 94%   BMI 20.08 kg/m²     General: Awake, confused.   In no apparent distress    MUSCULOSKELETAL:   left lower extremity:  Dressing with strikethrough noted but contained within Aquacel dressing  Spastic resting posture including flexion of the hips and knees  Leg lengths near equal  Compartments soft and compressible  Patient can actively plantarflex dorsiflex bilateral lower ankles, visible flexion extension of the first through fifth digits  +2/4 DP pulse, Brisk Cap refill, Toes warm and perfused  Tibial nerve, superficial and deep peroneal, saphenous, sural nerve sensation intact    CBC:   Lab Results   Component Value Date/Time    WBC 4.6 03/27/2023 04:43 AM    HGB 7.6 03/27/2023 04:43 AM    HCT 23.3 03/27/2023 04:43 AM     03/27/2023 04:43 AM     PT/INR:    Lab Results   Component Value Date/Time    PROTIME 14.1 03/22/2023 09:45 PM    INR 1.2 03/22/2023 09:45 PM         ASSESSMENT  History of left Harjeet hip arthroplasty with postoperative wound dehiscence status post irrigation and debridement with femoral head exchange on 3/23/2023    PLAN      Continue physical therapy and protocol: WBAT - LLE  Continue posterior hip precautions-avoid flexion, internal rotation, adduction of the operative hip  Please keep blankets/pillows in between the legs as patient's resting posture risks dislocation of the prosthesis  Intraoperative cultures, Gram stain negative to date  Currently receiving Keflex in the postop period-appreciate infectious disease input moving forward as to whether antibiotics will be necessary  Deep venous thrombosis prophylaxis -aspirin, early mobilization  PT/OT

## 2023-03-27 NOTE — PROGRESS NOTES
Department of Internal Medicine  General Internal Medicine  Attending Progress Note  Chief Complaint   Patient presents with    Other     Dehisced left hip surgical incision. SUBJECTIVE:    Confused and calm today. No fever and chills.  No chest pain    OBJECTIVE      Medications    Current Facility-Administered Medications: cephALEXin (KEFLEX) capsule 500 mg, 500 mg, Oral, 4 times per day  bisacodyl (DULCOLAX) EC tablet 5 mg, 5 mg, Oral, Daily  diphenhydrAMINE (BENADRYL) tablet 12.5 mg, 12.5 mg, Oral, Q4H PRN  sodium chloride flush 0.9 % injection 5-40 mL, 5-40 mL, IntraVENous, 2 times per day  sodium chloride flush 0.9 % injection 5-40 mL, 5-40 mL, IntraVENous, PRN  0.9 % sodium chloride infusion, , IntraVENous, PRN  acetaminophen (TYLENOL) tablet 650 mg, 650 mg, Oral, Q6H  oxyCODONE (ROXICODONE) immediate release tablet 5 mg, 5 mg, Oral, Q6H PRN **OR** oxyCODONE (ROXICODONE) immediate release tablet 10 mg, 10 mg, Oral, Q6H PRN  morphine (PF) injection 2 mg, 2 mg, IntraVENous, Q4H PRN **OR** morphine injection 4 mg, 4 mg, IntraVENous, Q4H PRN  magnesium hydroxide (MILK OF MAGNESIA) 400 MG/5ML suspension 30 mL, 30 mL, Oral, Daily PRN  polyethylene glycol (GLYCOLAX) packet 17 g, 17 g, Oral, Daily PRN  ondansetron (ZOFRAN-ODT) disintegrating tablet 4 mg, 4 mg, Oral, Q8H PRN **OR** ondansetron (ZOFRAN) injection 4 mg, 4 mg, IntraVENous, Q6H PRN  aspirin EC tablet 81 mg, 81 mg, Oral, BID  carbidopa-levodopa (SINEMET)  MG per tablet 2 tablet, 2 tablet, Oral, 4x Daily  Physical    VITALS:  /68   Pulse 56   Temp 97.9 °F (36.6 °C) (Oral)   Resp 16   Ht 5' 9\" (1.753 m)   Wt 136 lb (61.7 kg)   SpO2 94%   BMI 20.08 kg/m²   CONSTITUTIONAL:  awake, cooperative, and no apparent distress  EYES:  extra-ocular muscles intact  ENT:  normocepalic, without obvious abnormality  NECK:  supple, symmetrical, trachea midline  HEMATOLOGIC/LYMPHATICS:  no cervical lymphadenopathy  LUNGS:  no increased work of breathing,

## 2023-03-27 NOTE — PROGRESS NOTES
yes/s/p ex. lap Patient ambulated with wheeled walker and assist  Rehab Potential: good   for baseline    Past medical history:   Past Medical History:   Diagnosis Date    Parkinson's disease [facility])      Past Surgical History:   Procedure Laterality Date    ARM SURGERY Left 3/23/2023    LEFT HIP  IRRIGATION AND DEBRIDEMENT WITH POSSIBLE EXCHANGE OF FEMORAL HEAD COMPONENT performed by Boris Mccord DO at [address] [facility]      L chest    FRACTURE SURGERY      nose    HIP SURGERY Left 2/28/2023    LEFT HIP HEMIARTHROPLASTY (DEPUY) performed by Dorinda Gregorio DO at [address] [facility],[address] Right 05/16/2017    knee    KNEE ARTHROSCOPY Right     TONSILLECTOMY      TOTAL KNEE ARTHROPLASTY Left 9/1/2020    TOTAL LEFT KNEE REPLACEMENT/ ARTHROPLASTY (JOLENE) performed by Dorinda Gregorio DO at [facility]:    Precautions:    , falls, alarm, and confusion , Full Weight Bearing as tolerated (Order #[id number]) on 3/23/23, posterior hip precautions-avoid flexion, internal rotation, adduction of the operative hip    Imaging results: XR HIP LEFT (2-3 VIEWS)    Result Date: 3/22/2023  EXAMINATION: TWO XRAY VIEWS OF THE LEFT HIP 3/22/2023 4:20 pm    No evidence of acute bony abnormalities or prosthetic complications. Soft tissue swelling and soft tissue pockets of gas. XR CHEST 1 VIEW    Result Date: 3/22/2023  EXAMINATION: ONE XRAY VIEW OF THE CHEST 3/22/2023 4:35 pm    No acute process. Social history: Patient from rehab  prior lives with spouse in a ranch home  with 1 step  to enter home.    Walk in shower       Equipment owned: Lei Sloan, [address], [address] [facility] Saud chair, and Tub transfer bench,       [address] [facility]   How much help is needed turning from your back to your side while in a flat bed without using bedrails?: A Lot  How much help is needed moving from lying on your back to sitting on the side of a flat bed without using bedrails?:

## 2023-03-27 NOTE — CARE COORDINATION
3/27/2023: SS Note/Discharge plan:  Notified by Nitza Alonso admissions for [facility] that she currently has no beds available & Alan Welch, admissions for [facility] that they are declining pt, do not feel he has Acute rehab criteria, suggest QUETA placement, pt's wife notified, request sw check with [employer], referrals made to both facility liaisons, awaiting chart review and acceptance, NO PRE CERT NEEDED, sw to follow, nursing informed. Electronically signed by JORGE Moon on 3/27/2023 at 10:10 AM

## 2023-03-27 NOTE — DISCHARGE INSTR - COC
Continuity of Care Form    Patient Name: Radha Flynn   :  1950  MRN:  [id number]    Admit date:  3/22/2023  Discharge date:  3/28/2023    Code Status Order: Full Code   Advance Directives:     Admitting Physician:  Patricia Heller DO  PCP: None Provider    Discharging Nurse: [facility] Unit/Room#: 3187/7725-88  Discharging Unit Phone Number: [phone]    Emergency Contact:   Extended Emergency Contact Information  Primary Emergency Contact: Emily Elizabeth  Address: [address] [facility], [address] [place] [address] Phone: [phone]  Work Phone: [phone]  Mobile Phone: [phone]  Relation: Spouse    Past Surgical History:  Past Surgical History:   Procedure Laterality Date    ARM SURGERY Left 3/23/2023    LEFT HIP  IRRIGATION AND DEBRIDEMENT WITH POSSIBLE EXCHANGE OF FEMORAL HEAD COMPONENT performed by Patricia Heller DO at [address] [facility]      L chest    FRACTURE SURGERY      nose    HIP SURGERY Left 2023    LEFT HIP HEMIARTHROPLASTY (Willy Crumbly) performed by Estela Lake DO at [address] [facility] [address] Right 2017    knee    KNEE ARTHROSCOPY Right     TONSILLECTOMY      TOTAL KNEE ARTHROPLASTY Left 2020    TOTAL LEFT KNEE REPLACEMENT/ ARTHROPLASTY (JOLENE) performed by Estela Lake DO at [address]       Immunization History: There is no immunization history on file for this patient. Active Problems:  Patient Active Problem List   Diagnosis Code    S/P knee replacement Z96.659    Parkinson disease ([facility] [address] G20    Arthritis of left knee M17.12    Intertrochanteric fracture of left femur, closed, initial encounter [facility]) S72.142A    Fall W19. XXXA    Rupture of operation wound T81. 31XA       Isolation/Infection:   Isolation            No Isolation          Patient Infection Status       Infection Onset Added Last Indicated Last Indicated By Review Planned Expiration Resolved Resolved By    None active

## 2023-03-27 NOTE — CARE COORDINATION
3/27/2023: SS Note/Discharge plan:  [facility] declined pt, do not have an appropriate bed available per their liaison, pt accepted for [facility] admission at [address] [place] [address] Avenue on their dementia unit and bed will be available tomorrow 3/28 per Maynor, admissions liaison. Notified Stormy admissions at [facility] to fax pt's HENS to new SNF, JAMIE initiated, pt's wife, Stefani Beverly notified and agreeable to d/c plan, nursing informed, sw will follow for d/c order to confirm transfer arrangements.  Electronically signed by JORGE Vera on 3/27/2023 at 1:05 PM

## 2023-03-27 NOTE — PROGRESS NOTES
strength  for participation in functional tasks   LUE A/AAROM WFLwith exception of shoulder flexion 4/5  good  and wfl FMC/dexterity noted during ADL tasks    Improve overall LUE strength  for participation in functional tasks      Hearing: [facility]   Sensation:  No c/o numbness or tingling  Tone: WFL   Edema: none    Comments: Patient cleared by nursing staff. Upon arrival pt supine in bed with HOB partially elevated. Pt agreeable to OT tx session with partial cotreat with PT d/t level of physical assistance needed and pt's decreased endurance level to tolerate separate sessions, as well as for safety of pt and staff. Pt educated with regards to bed mobility, hand placement, safety awareness, static sitting balance,  standing balance, transfer training, functional mobility, UE dressing, B UE ROM ex's, ECT's. At end of session pt seated in bed with HOB elevated with positioning of LLE provided. Call light within reach. Alarm on. Overall, pt demonstrated decreased independence and safety during completion of ADL/functional transfers/mobility tasks. Pt would benefit from continued skilled OT to increase safety and independence with completion of ADL/IADL tasks for functional independence and quality of life. Pt required cues and education as noted above for safe facilitation and completion of tasks. Therapist provided skilled monitoring of patient's response during treatment session. Prior to and at the end of session, environmental modifications completed for patients safety and efficiency of treatment session. Overall, patient demonstrates moderate difficulties with completion of BADLs and IADLs. Factors contributing to these difficulties include posterior lean upon initial standing, decreased endurance, and generalized weakness. As noted above, patient likely to benefit from further OT intervention to increase independence, safety, and overall quality of life.      Treatment:     Bed mobility: Facilitated

## 2023-03-27 NOTE — PROGRESS NOTES
Physical Therapy Treatment Note/Plan of Care    Room #:  0323/0323-01  Patient Name: Sharri Anguiano  YOB: 1950  MRN: [id number]    Date of Service: 3/27/2023     Tentative placement recommendation: Acute versus Subacute Rehab  Equipment recommendation: To be determined      Evaluating Physical Therapist: Tavon Decker PT #[id number]      Specific Provider Orders/Date/Referring Provider :  03/22/23 1930    PT eval and treat  Start:  03/22/23 1930,   End:  03/22/23 1930,   ONE TIME,   Standing Count:  1 Occurrences,   R        Last continued at transfer on Wed Mar 22, 2023  8:50 PM    Yessy Rodriguez DO     Admitting Diagnosis:   Wound dehiscence Elton Scriptrobin     from nursing home with concern about bleeding following his left hip surgery. He had surgery February 28 of this year, was back to the nursing home and now has a dehiscence of the left surgical wound  Surgery:    left hip hemiarthroplasty by Dr. Iván Mccoy on 2-  Date of Procedure: 3/23/2023  Procedure:  Left hip irrigation debridement with femoral head exchange and wound closure. Surgeon(s): Patrica Garcia DO    Visit Diagnoses         Codes    Dehiscence of operative wound, initial encounter    -  Primary T81.31XA    Surgical site infection     T81.49XA    Post-op pain     G89.18            Patient Active Problem List   Diagnosis    S/P knee replacement    Parkinson disease ([facility] [address]    Arthritis of left knee    Intertrochanteric fracture of left femur, closed, initial encounter [facility])    Fall    Rupture of operation wound        ASSESSMENT of Current Deficits Patient exhibits decreased strength, balance, and endurance impairing functional mobility, transfers, gait , gait distance, and tolerance to activity history of parkinson's. Patient needing minimal assist for supine to sit and moderate assist x 2 for sit to stand transfers/ gait training.  Pt displays a retropulsive lean with standing initially but with verbal/tactile cues able to attain

## 2023-03-28 VITALS
DIASTOLIC BLOOD PRESSURE: 64 MMHG | BODY MASS INDEX: 20.14 KG/M2 | WEIGHT: 136 LBS | RESPIRATION RATE: 16 BRPM | OXYGEN SATURATION: 97 % | HEIGHT: 69 IN | TEMPERATURE: 97.8 F | SYSTOLIC BLOOD PRESSURE: 132 MMHG | HEART RATE: 78 BPM

## 2023-03-28 LAB
ACID FAST STN SPEC QL: NORMAL
ACID FAST STN SPEC QL: NORMAL
BASOPHILS # BLD: 0.04 E9/L (ref 0–0.2)
BASOPHILS NFR BLD: 0.9 % (ref 0–2)
EOSINOPHIL # BLD: 0.74 E9/L (ref 0.05–0.5)
EOSINOPHIL NFR BLD: 16.7 % (ref 0–6)
ERYTHROCYTE [DISTWIDTH] IN BLOOD BY AUTOMATED COUNT: 12.9 FL (ref 11.5–15)
HCT VFR BLD AUTO: 26.3 % (ref 37–54)
HGB BLD-MCNC: 8.5 G/DL (ref 12.5–16.5)
IMM GRANULOCYTES # BLD: 0.01 E9/L
IMM GRANULOCYTES NFR BLD: 0.2 % (ref 0–5)
LYMPHOCYTES # BLD: 0.94 E9/L (ref 1.5–4)
LYMPHOCYTES NFR BLD: 21.3 % (ref 20–42)
MCH RBC QN AUTO: 29.7 PG (ref 26–35)
MCHC RBC AUTO-ENTMCNC: 32.3 % (ref 32–34.5)
MCV RBC AUTO: 92 FL (ref 80–99.9)
MONOCYTES # BLD: 0.44 E9/L (ref 0.1–0.95)
MONOCYTES NFR BLD: 10 % (ref 2–12)
NEUTROPHILS # BLD: 2.25 E9/L (ref 1.8–7.3)
NEUTS SEG NFR BLD: 50.9 % (ref 43–80)
PLATELET # BLD AUTO: 229 E9/L (ref 130–450)
PMV BLD AUTO: 10.9 FL (ref 7–12)
RBC # BLD AUTO: 2.86 E12/L (ref 3.8–5.8)
SARS-COV-2 RDRP RESP QL NAA+PROBE: NOT DETECTED
WBC # BLD: 4.4 E9/L (ref 4.5–11.5)

## 2023-03-28 PROCEDURE — 87635 SARS-COV-2 COVID-19 AMP PRB: CPT

## 2023-03-28 PROCEDURE — 6370000000 HC RX 637 (ALT 250 FOR IP): Performed by: ORTHOPAEDIC SURGERY

## 2023-03-28 PROCEDURE — 99232 SBSQ HOSP IP/OBS MODERATE 35: CPT | Performed by: INTERNAL MEDICINE

## 2023-03-28 PROCEDURE — 97110 THERAPEUTIC EXERCISES: CPT

## 2023-03-28 PROCEDURE — 97535 SELF CARE MNGMENT TRAINING: CPT

## 2023-03-28 PROCEDURE — 97530 THERAPEUTIC ACTIVITIES: CPT

## 2023-03-28 PROCEDURE — 6370000000 HC RX 637 (ALT 250 FOR IP): Performed by: SPECIALIST

## 2023-03-28 PROCEDURE — 85025 COMPLETE CBC W/AUTO DIFF WBC: CPT

## 2023-03-28 PROCEDURE — 6370000000 HC RX 637 (ALT 250 FOR IP): Performed by: INTERNAL MEDICINE

## 2023-03-28 PROCEDURE — 36415 COLL VENOUS BLD VENIPUNCTURE: CPT

## 2023-03-28 PROCEDURE — 2580000003 HC RX 258: Performed by: ORTHOPAEDIC SURGERY

## 2023-03-28 RX ADMIN — CARBIDOPA AND LEVODOPA 2 TABLET: 25; 100 TABLET ORAL at 17:12

## 2023-03-28 RX ADMIN — BISACODYL 5 MG: 5 TABLET, COATED ORAL at 09:49

## 2023-03-28 RX ADMIN — ACETAMINOPHEN 650 MG: 325 TABLET ORAL at 17:11

## 2023-03-28 RX ADMIN — CEPHALEXIN 500 MG: 500 CAPSULE ORAL at 11:53

## 2023-03-28 RX ADMIN — Medication 10 ML: at 11:22

## 2023-03-28 RX ADMIN — CARBIDOPA AND LEVODOPA 2 TABLET: 25; 100 TABLET ORAL at 09:49

## 2023-03-28 RX ADMIN — CEPHALEXIN 500 MG: 500 CAPSULE ORAL at 05:25

## 2023-03-28 RX ADMIN — CARBIDOPA AND LEVODOPA 2 TABLET: 25; 100 TABLET ORAL at 11:52

## 2023-03-28 RX ADMIN — CEPHALEXIN 500 MG: 500 CAPSULE ORAL at 18:35

## 2023-03-28 RX ADMIN — ACETAMINOPHEN 650 MG: 325 TABLET ORAL at 09:52

## 2023-03-28 RX ADMIN — ASPIRIN 81 MG: 81 TABLET, COATED ORAL at 09:49

## 2023-03-28 ASSESSMENT — PAIN DESCRIPTION - ORIENTATION
ORIENTATION: LEFT
ORIENTATION: LEFT

## 2023-03-28 ASSESSMENT — PAIN DESCRIPTION - DESCRIPTORS: DESCRIPTORS: ACHING

## 2023-03-28 ASSESSMENT — PAIN DESCRIPTION - LOCATION: LOCATION: HIP

## 2023-03-28 ASSESSMENT — PAIN SCALES - GENERAL: PAINLEVEL_OUTOF10: 1

## 2023-03-28 ASSESSMENT — PAIN - FUNCTIONAL ASSESSMENT: PAIN_FUNCTIONAL_ASSESSMENT: PREVENTS OR INTERFERES SOME ACTIVE ACTIVITIES AND ADLS

## 2023-03-28 NOTE — CARE COORDINATION
3/28/2023: SS Note/Discharge plan:  Radha Brooke, admissions for [address] confirming pt's transfer for today at 5:30 pm [facility], pt's wife, Roselia Dsouza notified of transfer arrangements,JAMIE completed, RAPID COVID TEST ordered, HENS from previous SNF being faxed, nursing informed.  Electronically signed by JORGE Mcgowan on 3/28/2023 at 4:08 PM

## 2023-03-28 NOTE — PROGRESS NOTES
supple, symmetrical, trachea midline  HEMATOLOGIC/LYMPHATICS:  no cervical lymphadenopathy  LUNGS:  no increased work of breathing, no retractions, and clear to auscultation  CARDIOVASCULAR:  normal apical pulses and normal S1 and S2  ABDOMEN:  normal bowel sounds, non-distended, and non-tender  MUSCULOSKELETAL:  full range of motion noted  NEUROLOGIC:    SKIN:  no bruising or bleeding  Data    CBC:   Lab Results   Component Value Date/Time    WBC 4.4 03/28/2023 05:37 AM    RBC 2.86 03/28/2023 05:37 AM    HGB 8.5 03/28/2023 05:37 AM    HCT 26.3 03/28/2023 05:37 AM    MCV 92.0 03/28/2023 05:37 AM    MCH 29.7 03/28/2023 05:37 AM    MCHC 32.3 03/28/2023 05:37 AM    RDW 12.9 03/28/2023 05:37 AM     03/28/2023 05:37 AM    MPV 10.9 03/28/2023 05:37 AM     BMP:    Lab Results   Component Value Date/Time     03/24/2023 04:24 AM    K 4.1 03/24/2023 04:24 AM     03/24/2023 04:24 AM    CO2 25 03/24/2023 04:24 AM    BUN 14 03/24/2023 04:24 AM    LABALBU 3.5 03/23/2023 09:32 AM    CREATININE 0.7 03/24/2023 04:24 AM    CALCIUM 7.8 03/24/2023 04:24 AM    GFRAA >60 08/31/2020 09:20 AM    LABGLOM >60 03/24/2023 04:24 AM    GLUCOSE 95 03/24/2023 04:24 AM       ASSESSMENT AND PLAN    Admitted with surgical wound dehiscence. Went in for I and D. Now on abx per ID and final wound culture and sensitivity still pending. Rupture of operation wound: surgical wound Dehiscence: s/p wash out. ID following and patient is currently on keflex. final wound culture 3/23 (-). We are on for consult for medical management    Parkinson disease: close monitoring. Continue home meds  Anemia due to post op blood loss: hgb is stable for now. Continue to monitor and transfuse if needed. DVT prophylaxis: per primary surgical service. Meanwhile scds  Full code. Disposition: bed available on 3/28 at [address] [place] [address]. Jessica done.   D.c order per primary surgical service

## 2023-03-28 NOTE — PROGRESS NOTES
Treatment:     ADL completion: Self-care retraining for the above-mentioned ADLs; training on proper hand placement, safety technique, sequencing, and energy conservation techniques. Skilled positioning: Proper positioning to improve interaction with environment, overall functioning   Therapeutic Ex's: To increase B UE strength/ROM and endurance required for functional transfers and ADL participation. Pt has made fair progress towards set goals     OT 1-3 days/wk for 2 weeks PRN     Treatment Time also includes thorough review of current medical information, gathering information on past medical history/social history and prior level of function, informal observation of tasks, assessment of data and education on plan of care and goals.     Treatment Time In: 10:50 AM   Treatment Time Out: 11:13 AM          Treatment Charges: Mins Units   ADL/Home Mgt     64406 13 1   Thera Activities     [id number]       Ther Ex                 84161 10 1   Manual Therapy    [id number]     Neuro Re-ed         06907     Orthotic manage/training                               16821     Non Billable Time     Total Timed Treatment 23 [address] [facility] [address] [place] [address]

## 2023-03-28 NOTE — PROGRESS NOTES
The visualized portion of the orbits demonstrate no acute abnormality. SINUSES: The visualized paranasal sinuses and mastoid air cells demonstrate no acute abnormality. SOFT TISSUES/SKULL:  No acute abnormality of the visualized skull or soft tissues. 1.  Bilateral thalamic nuclei neural stimulators without evidence of complication. 2.  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 3.  No acute intracranial hemorrhage. CT CERVICAL SPINE WO CONTRAST    Result Date: 2/27/2023  EXAMINATION: CT OF THE CERVICAL SPINE WITHOUT CONTRAST 2/27/2023 2:21 pm TECHNIQUE: CT of the cervical spine was performed without the administration of intravenous contrast. Multiplanar reformatted images are provided for review. Automated exposure control, iterative reconstruction, and/or weight based adjustment of the mA/kV was utilized to reduce the radiation dose to as low as reasonably achievable. COMPARISON: None. HISTORY: ORDERING SYSTEM PROVIDED HISTORY: neck pain, r/o fx TECHNOLOGIST PROVIDED HISTORY: Reason for exam:->neck pain, r/o fx FINDINGS: BONES/ALIGNMENT: The craniocervical and atlantoaxial junctions are intact. The odontoid process is intact. Grade 1 anterolisthesis of C2 on C3 and C3 on C4 likely chronic. Mild reversal of the cervical lordosis. Normal alignment is otherwise maintained. The vertebral body heights are preserved. No fracture or other acute osseous abnormality identified. DEGENERATIVE CHANGES: Severe degenerative disc disease from C3-4 through C6-7. Multilevel bilateral facet arthropathy severe on the right at C2-3 and C3-4. SOFT TISSUES: There is no prevertebral soft tissue swelling. 1. No acute cervical spine abnormality. 2. Severe degenerative changes. Chronic grade 1 anterolisthesis of C2 on C3 and C3 on C4. XR CHEST 1 VIEW    Result Date: 3/22/2023  EXAMINATION: ONE XRAY VIEW OF THE CHEST 3/22/2023 4:35 pm COMPARISON: None.  HISTORY: ORDERING SYSTEM PROVIDED HISTORY:

## 2023-03-28 NOTE — PROGRESS NOTES
walker with Moderate assist of 1   cues for sequencing, FWB (full weight bearing) weight bearing left lower extremity, upright posture, walker approximation, increased base of support, increased step length, safety, and hip precautions. 10 feet using  wheeled walker with Moderate assist of 1      Stair negotiation: ascended and descended   Not assessed          ROM Within functional limits  lefth ip within precautions  Increase range of motion 10% of affected joints    Strength BUE:  refer to OT eval  RLE:  3+/5  LLE:  3-/5  Increase strength in affected mm groups by 1/3 grade   Balance Sitting EOB:  fair    Dynamic Standing:  poor left lateral posterior lean wheeled walker assist of 2 Sitting EOB: fair minus due to left lateral lean  Dynamic Standing: fair with retropulsive stand initially then left lateral lean verbal/tactile cues to get to mid-line position. Sitting EOB:  fair +  Dynamic Standing: fair wheeled walker      Patient is Alert & Oriented x person and follows directions    Sensation:  Patient  not appropriate to assess    Edema:  yes left lower extremity   Endurance: fair       Vitals: room air   Blood Pressure at rest  Blood Pressure during session    Heart Rate at rest   Heart Rate during session     SPO2 at rest  %  SPO2 during session %     Patient education  Patient educated on role of Physical Therapy, risks of immobility, safety and plan of care,  importance of mobility while in hospital , ankle pumps, quad set and glut set for edema control, blood clot prevention, hip precautions, weight bearing status , and positioning for skin integrity and comfort     Patient response to education:   Pt verbalized understanding Pt demonstrated skill Pt requires further education in this area   Yes Partial Yes      Treatment:  Patient practiced and was instructed/facilitated in the following treatment: Patient performed supine BLE & BUE exercises.  Pt assisted to edge of bed,  Sat edge of bed 15 minutes

## 2023-03-28 NOTE — PROGRESS NOTES
minutes with Minimal assist of 1 to increase dynamic sitting balance and activity tolerance. Worked on sitting balance due to left lateral lean. Pt stood, he had a retropulsive stand, verbal/tactile cues for hip extension, getting both his feet under him, upright posture, and increase his base of support. Pt stood, ambulated in the room 2 x 10 feet, and back to the chair. Therapeutic Exercises:  ankle pumps, quad sets, heel slide, hip abduction/adduction, and straight leg raise, x  15 - 20 reps. At end of session, patient in chair with RODRIGUEZ present call light and phone within reach,  all lines and tubes intact, nursing notified. Patient would benefit from continued skilled Physical Therapy to improve functional independence and quality of life. Patient's/ family goals   home versus acute rehab    Time in  15:15  Time out 15:50    Total Treatment Time  35 minutes    CPT codes:    Therapeutic activities (76395)   15 minutes  1 unit(s)  Therapeutic exercises (90322)   10 minutes  1 unit(s)  Non billable time 10 minutes co-treated with Claudia Espino  [facility]  LIC # [id number]

## 2023-03-28 NOTE — PROGRESS NOTES
OCCUPATIONAL THERAPY BEDSIDE TREATMENT NOTE   [facility] CTR  [facility] Brandi Lawson. [place]    Date:3/28/2023  Patient Name: Niyah Bourgeois  MRN: [id number]  : 1950  Room: [address] [place] [address]    Evaluating OT: Arslan Wetzeler OTR/L; [id number]      Referring Provider and Specific Provider Orders/Date:      23   OT eval and treat  Start:  23,   End:  23,   ONE TIME,   Standing Count:  1 Occurrences,   R        Last continued at transfer on Wed Mar 22, 2023  8:50 PM    Eugene Salguero DO       Placement Recommendation: IP Rehab         Diagnosis:   1. Dehiscence of operative wound, initial encounter    2. Surgical site infection    3. Post-op pain        Surgery: underwent a left hip hemiarthroplasty (WBAT, hip precautions) with Dr. Halle Chambers on 2023, had a fall at the subacute rehab resulting in surgical incision dehiscence, anticipate irrigation debridement 3/23/23 with possible hardware exchange. UPDATE: 3/23/23 Left hip irrigation debridement with femoral head exchange and wound closure. Full WB: L LE. Hip Precautions.       Pertinent Medical History:       Past Medical History           Past Medical History:   Diagnosis Date    Parkinson's disease [facility])              Past Surgical History             Past Surgical History:   Procedure Laterality Date    ARM SURGERY Left 3/23/2023     LEFT HIP  IRRIGATION AND DEBRIDEMENT WITH POSSIBLE EXCHANGE OF FEMORAL HEAD COMPONENT performed by Mesfin Bobo DO at [address] [facility]         L chest    FRACTURE SURGERY         nose    HIP SURGERY Left 2023     LEFT HIP HEMIARTHROPLASTY (DEPUY) performed by Bogdan Huddleston DO at [address] [facility],[address] Right 2017     knee    KNEE ARTHROSCOPY Right      TONSILLECTOMY        TOTAL KNEE ARTHROPLASTY Left 2020     TOTAL LEFT KNEE REPLACEMENT/ ARTHROPLASTY (JOLENE) performed by Bogdan Huddleston DO

## 2023-03-28 NOTE — PROGRESS NOTES
Department of Orthopedic Surgery  Resident Progress Note    Patient seen and examined, patient is up resting in the chair. Patient has having some tremors at baseline. Patient is more responsive this morning, able to answer questions. VITALS:  /64   Pulse 78   Temp 97.8 °F (36.6 °C) (Axillary)   Resp 16   Ht 5' 9\" (1.753 m)   Wt 136 lb (61.7 kg)   SpO2 97%   BMI 20.08 kg/m²     General: Awake and alert no apparent distress    MUSCULOSKELETAL:   left lower extremity:  Dressing with strikethrough noted but contained within Aquacel dressing  Compartments soft and compressible  Patient is able to demonstrate plantarflexion, dorsiflexion, great toe extension   +2/4 DP pulse, Brisk Cap refill, Toes warm and perfused  Tibial nerve, superficial and deep peroneal, saphenous, sural nerve sensation intact    CBC:   Lab Results   Component Value Date/Time    WBC 4.4 03/28/2023 05:37 AM    HGB 8.5 03/28/2023 05:37 AM    HCT 26.3 03/28/2023 05:37 AM     03/28/2023 05:37 AM     PT/INR:    Lab Results   Component Value Date/Time    PROTIME 14.1 03/22/2023 09:45 PM    INR 1.2 03/22/2023 09:45 PM         ASSESSMENT  History of left Harjeet hip arthroplasty with postoperative wound dehiscence status post irrigation and debridement with femoral head exchange on 3/23/2023    PLAN      Continue physical therapy and protocol: WBAT - LLE  Continue posterior hip precautions-avoid flexion, internal rotation, adduction of the operative hip  Please keep blankets/pillows in between the legs as patient's resting posture risks dislocation of the prosthesis  Intraoperative cultures, Gram stain negative to date  Appreciate infectious disease recommendations   deep venous thrombosis prophylaxis -aspirin, early mobilization  PT/OT as able  Pain Control: IV and PO. Wean to p.o. as able  Monitor H&H.  8.5  D/C Planning. Appreciate medicine and infectious disease co-management.   Anticipate discharge to nursing facility when

## 2023-03-28 NOTE — CARE COORDINATION
3/28/2023: SS Note/Discharge plan:  Pt accepted for [facility] admission at [address] on their dementia unit, bed is available today, awaiting medical d/c to arrange transfer, pt's wife in agreement to d/c plan, JAMIE initiated, HENS from previous SNF being faxed, nursing informed.  Electronically signed by JORGE Steiner on 3/28/2023 at 12:36 PM

## 2023-03-28 NOTE — OP NOTE
Operative Note      Patient: Ina Turner  YOB: 1950  MRN: [id number]    Date of Procedure: 3/23/2023    Pre-Op Diagnosis: Left hip surgical wound dehiscence    Post-Op Diagnosis: Same       Procedure:  Left hip irrigation debridement with femoral head exchange and wound closure. Surgeon(s): Kelsey Dukes DO    Assistant:   Resident: Joan Juarez DO; Nahomy Quintanilla DO; Allison Escobar DO    Anesthesia: General    Estimated Blood Loss (mL): less than 097     Complications: None    Specimens:   ID Type Source Tests Collected by Time Destination   1 : DEEP CULTURE SWAB LEFT HIP  Tissue Tissue CULTURE, ANAEROBIC, CULTURE, FUNGUS, GRAM STAIN, CULTURE, SURGICAL, CULTURE WITH SMEAR, ACID FAST BACILLIUS Kelsey Dukes, DO 3/23/2023 1816    2 : SUPERFICIAL TISSUE CULTURE LEFT HIP  Tissue Tissue CULTURE, ANAEROBIC, CULTURE, FUNGUS, GRAM STAIN, CULTURE, SURGICAL, CULTURE WITH SMEAR, ACID FAST BACILLIUS Kelsey Dukes, DO 3/23/2023 1818    3 : DEEP TISSUE CULTURE LEFT HIP  Tissue Tissue CULTURE, ANAEROBIC, CULTURE, FUNGUS, GRAM STAIN, CULTURE, SURGICAL, CULTURE WITH SMEAR, ACID FAST [address], DO 3/23/2023 1819        Implants:  Implant Name Type Inv.  Item Serial No.  Lot No. LRB No. Used Action   HEAD FEM [id number] NK L+1.5MM HIP MTL ON MTL 12/14 TAPR - [id number]  HEAD FEM [id number] NK L+1.5MM HIP MTL ON MTL 12/14 TAPR  [employer] [id number] Left 1 Implanted   HEAD BPLR OD49MM [id number] FEM HIP SELF CNTR - [id number]  HEAD BPLR OD49MM [id number] FEM HIP SELF CNTR  [employer] [id number] Left 1 Implanted         Drains:   [REMOVED] Urinary Catheter 02/27/23 Brown (Removed)   Catheter Indications Urinary retention (acute or chronic), continuous bladder irrigation or bladder outlet obstruction 03/01/23 1016   Site Assessment No urethral drainage 03/01/23 1016   Urine Color Samara 03/01/23 1750   Urine Appearance Clear 03/01/23 1750   Collection
full length of his prior incision. Careful dissection was taken down and the IT band was noted to be dehisced at the length of its prior repair. The capsule repair was then split along its prior course completing the arthrotomy. The hip was then dislocated. Deep cultures were then taken. Next using a tamp and a mallet the femoral head was dislodged from the stem. The stem was checked and found to be appropriately stable. The wound was irrigated with 6 L of normal sterile saline and and synovectomy was carried out using a rongeur. At this point the same size head was then placed back on the stem and malleted into position. It was found to be well fixed to the femoral stem. The hip was then reduced and taken through range of motion and found to be appropriately stable. The capsule was then closed using 0 PDS and again irrigated and vancomycin powder was placed along the implant. The IT band was then closed using 0 PDS and an 0 strata fix. Cellerate type I activated bovine collagen powder was then placed in the wound to help with wound healing. The subcutaneous tissue was then closed using 2-0 Monocryl sutures and the skin was closed using 3-0 nylon sutures. A sterile dressing was applied the patient was woken from anesthesia and transferred back to his hospital bed with several individuals in safe fashion. The patient was then transferred to PACU in stable condition. Postoperative plan:  The patient will be transferred back to the medical surgical tabares. He will be weightbearing as tolerated to the operative extremity we have placed orders for PT to evaluate and work with the patient. He will receive 24 hours of postoperative antibiotics and tomorrow will be started on chemical DVT prophylaxis. We will follow his surgical cultures.

## 2023-03-29 ENCOUNTER — OUTSIDE SERVICES (OUTPATIENT)
Dept: PRIMARY CARE CLINIC | Age: 73
End: 2023-03-29
Payer: MEDICARE

## 2023-03-29 DIAGNOSIS — R53.1 WEAKNESS: ICD-10-CM

## 2023-03-29 DIAGNOSIS — T81.31XD DEHISCENCE OF OPERATIVE WOUND, SUBSEQUENT ENCOUNTER: ICD-10-CM

## 2023-03-29 DIAGNOSIS — Z47.89 ENCOUNTER FOR OTHER ORTHOPEDIC AFTERCARE: ICD-10-CM

## 2023-03-29 DIAGNOSIS — G20 PARKINSON'S DISEASE (HCC): ICD-10-CM

## 2023-03-29 DIAGNOSIS — S72.002D CLOSED FRACTURE OF NECK OF LEFT FEMUR WITH ROUTINE HEALING: Primary | ICD-10-CM
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
## 2023-03-29 LAB
BACTERIA SPEC ANAEROBE CULT: NORMAL
LOEFFLER MB STN SPEC: NORMAL
LOEFFLER MB STN SPEC: NORMAL

## 2023-03-29 PROCEDURE — 99306 1ST NF CARE HIGH MDM 50: CPT | Performed by: INTERNAL MEDICINE

## 2023-03-29 PROCEDURE — 99497 ADVNCD CARE PLAN 30 MIN: CPT | Performed by: INTERNAL MEDICINE

## 2023-04-04 LAB
BACTERIA URNS QL MICRO: ABNORMAL /HPF
BILIRUB UR QL STRIP: NEGATIVE
CLARITY UR: CLEAR
COLOR UR: YELLOW
EPI CELLS #/AREA URNS HPF: ABNORMAL /HPF
GLUCOSE UR STRIP-MCNC: NEGATIVE MG/DL
HGB UR QL STRIP: NEGATIVE
KETONES UR STRIP-MCNC: ABNORMAL MG/DL
LEUKOCYTE ESTERASE UR QL STRIP: NEGATIVE
NITRITE UR QL STRIP: NEGATIVE
PH UR STRIP: 5.5 [PH] (ref 5–9)
PROT UR STRIP-MCNC: ABNORMAL MG/DL
RBC #/AREA URNS HPF: ABNORMAL /HPF (ref 0–2)
SP GR UR STRIP: 1.02 (ref 1–1.03)
UROBILINOGEN UR STRIP-ACNC: 1 E.U./DL
WBC #/AREA URNS HPF: ABNORMAL /HPF (ref 0–5)

## 2023-04-06 DIAGNOSIS — S72.142A INTERTROCHANTERIC FRACTURE OF LEFT FEMUR, CLOSED, INITIAL ENCOUNTER (HCC): Primary | ICD-10-CM

## 2023-04-06 LAB — BACTERIA UR CULT: NORMAL

## 2023-04-17 LAB
FUNGUS SPEC CULT: NORMAL
FUNGUS SPEC CULT: NORMAL
LOEFFLER MB STN SPEC: NORMAL
LOEFFLER MB STN SPEC: NORMAL

## 2023-04-18 LAB
ACID FAST STN SPEC QL: NORMAL
ACID FAST STN SPEC QL: NORMAL
MYCOBACTERIUM SPEC CULT: NORMAL
MYCOBACTERIUM SPEC CULT: NORMAL

## 2023-04-24 ENCOUNTER — OFFICE VISIT (OUTPATIENT)
Dept: FAMILY MEDICINE CLINIC | Age: 73
End: 2023-04-24
Payer: MEDICARE

## 2023-04-24 VITALS
OXYGEN SATURATION: 90 % | BODY MASS INDEX: 19.26 KG/M2 | WEIGHT: 130 LBS | DIASTOLIC BLOOD PRESSURE: 58 MMHG | SYSTOLIC BLOOD PRESSURE: 134 MMHG | TEMPERATURE: 97.2 F | HEIGHT: 69 IN | RESPIRATION RATE: 16 BRPM | HEART RATE: 60 BPM

## 2023-04-24 DIAGNOSIS — Z12.11 COLON CANCER SCREENING: Primary | ICD-10-CM

## 2023-04-24 DIAGNOSIS — S72.142A INTERTROCHANTERIC FRACTURE OF LEFT FEMUR, CLOSED, INITIAL ENCOUNTER (HCC): ICD-10-CM

## 2023-04-24 DIAGNOSIS — Z91.81 AT HIGH RISK FOR FALLS: ICD-10-CM

## 2023-04-24 DIAGNOSIS — G20 PARKINSON DISEASE (HCC): ICD-10-CM

## 2023-04-24 PROCEDURE — 99204 OFFICE O/P NEW MOD 45 MIN: CPT | Performed by: FAMILY MEDICINE

## 2023-04-24 PROCEDURE — 3017F COLORECTAL CA SCREEN DOC REV: CPT | Performed by: FAMILY MEDICINE

## 2023-04-24 PROCEDURE — G8420 CALC BMI NORM PARAMETERS: HCPCS | Performed by: FAMILY MEDICINE

## 2023-04-24 PROCEDURE — 1111F DSCHRG MED/CURRENT MED MERGE: CPT | Performed by: FAMILY MEDICINE

## 2023-04-24 PROCEDURE — G8427 DOCREV CUR MEDS BY ELIG CLIN: HCPCS | Performed by: FAMILY MEDICINE

## 2023-04-24 PROCEDURE — 1036F TOBACCO NON-USER: CPT | Performed by: FAMILY MEDICINE

## 2023-04-24 PROCEDURE — 1123F ACP DISCUSS/DSCN MKR DOCD: CPT | Performed by: FAMILY MEDICINE

## 2023-04-24 RX ORDER — ATROPINE SULFATE 10 MG/ML
SOLUTION/ DROPS OPHTHALMIC
COMMUNITY
Start: 2022-05-03

## 2023-04-24 SDOH — ECONOMIC STABILITY: FOOD INSECURITY: WITHIN THE PAST 12 MONTHS, THE FOOD YOU BOUGHT JUST DIDN'T LAST AND YOU DIDN'T HAVE MONEY TO GET MORE.: NEVER TRUE

## 2023-04-24 SDOH — ECONOMIC STABILITY: INCOME INSECURITY: HOW HARD IS IT FOR YOU TO PAY FOR THE VERY BASICS LIKE FOOD, HOUSING, MEDICAL CARE, AND HEATING?: NOT VERY HARD

## 2023-04-24 SDOH — ECONOMIC STABILITY: HOUSING INSECURITY
IN THE LAST 12 MONTHS, WAS THERE A TIME WHEN YOU DID NOT HAVE A STEADY PLACE TO SLEEP OR SLEPT IN A SHELTER (INCLUDING NOW)?: NO

## 2023-04-24 SDOH — ECONOMIC STABILITY: FOOD INSECURITY: WITHIN THE PAST 12 MONTHS, YOU WORRIED THAT YOUR FOOD WOULD RUN OUT BEFORE YOU GOT MONEY TO BUY MORE.: NEVER TRUE

## 2023-04-24 ASSESSMENT — ENCOUNTER SYMPTOMS
CONSTIPATION: 0
DIARRHEA: 0
WHEEZING: 0
BLOOD IN STOOL: 0

## 2023-04-24 ASSESSMENT — PATIENT HEALTH QUESTIONNAIRE - PHQ9
1. LITTLE INTEREST OR PLEASURE IN DOING THINGS: 0
SUM OF ALL RESPONSES TO PHQ QUESTIONS 1-9: 0
SUM OF ALL RESPONSES TO PHQ9 QUESTIONS 1 & 2: 0
SUM OF ALL RESPONSES TO PHQ QUESTIONS 1-9: 0
2. FEELING DOWN, DEPRESSED OR HOPELESS: 0
SUM OF ALL RESPONSES TO PHQ QUESTIONS 1-9: 0
SUM OF ALL RESPONSES TO PHQ QUESTIONS 1-9: 0

## 2023-04-24 NOTE — PROGRESS NOTES
Amie Davalos (:  1950) is a 81 y.o. male,Established patient, here for evaluation of the following chief complaint(s):  Establish Care (Wanting to make sure lungs sound clear /)         ASSESSMENT/PLAN:  1. Colon cancer screening  -     Fecal DNA Colorectal cancer screening ([employer])  2. Parkinson disease ([facility]  3. At high risk for falls  4. Intertrochanteric fracture of left femur, closed, initial encounter ([facility]      No follow-ups on file. Subjective   SUBJECTIVE/OBJECTIVE:  Establish Care (Wanting to make sure lungs sound clear /)    Had Left hip Gx then prosthesis the redo all in 4 week period. [facility]        Review of Systems   Constitutional:  Negative for chills and diaphoresis. HENT:  Negative for ear discharge, ear pain, hearing loss, nosebleeds and tinnitus. Respiratory:  Negative for wheezing. Cardiovascular:  Negative for chest pain. Gastrointestinal:  Negative for blood in stool, constipation and diarrhea. Genitourinary:  Negative for dysuria, flank pain and hematuria. Musculoskeletal:  Positive for arthralgias. Skin:  Negative for rash. Neurological:  Positive for dizziness. Negative for headaches. Parkinsons     Hematological:  Does not bruise/bleed easily. Psychiatric/Behavioral:  Negative for agitation, behavioral problems and confusion. Rigidity , eyes closed, non ambulatoing        Objective   BP (!) 134/58   Pulse 60   Temp 97.2 °F (36.2 °C)   Resp 16   Ht 5' 9\" (1.753 m)   Wt 130 lb (59 kg)   SpO2 90%   BMI 19.20 kg/m²   No results found for: LABA1C  Physical Exam  Eyes:      General:         Right eye: No discharge. Left eye: No discharge. Cardiovascular:      Rate and Rhythm: Normal rate and regular rhythm. Heart sounds: No murmur heard. Pulmonary:      Effort: No respiratory distress. Breath sounds: No rhonchi or rales. Comments: CTA  Abdominal:      Palpations: Abdomen is soft.

## 2023-04-26 ENCOUNTER — TELEPHONE (OUTPATIENT)
Dept: FAMILY MEDICINE CLINIC | Age: 73
End: 2023-04-26

## 2023-04-26 DIAGNOSIS — D50.8 OTHER IRON DEFICIENCY ANEMIA: ICD-10-CM

## 2023-04-26 DIAGNOSIS — T81.31XS DEHISCENCE OF OPERATIVE WOUND, SEQUELA: Primary | ICD-10-CM

## 2023-04-26 DIAGNOSIS — T84.52XS INFECTION ASSOCIATED WITH INTERNAL LEFT HIP PROSTHESIS, SEQUELA: ICD-10-CM

## 2023-04-26 NOTE — TELEPHONE ENCOUNTER
ASSESSMENT/PLAN:    1. Dehiscence of operative wound, sequela  - Sedimentation Rate; Future  - C-Reactive Protein; Future    2. 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  Other iron deficiency anemia  - CBC with Auto Differential; Future        FOLLOW-UP:  prn

## 2023-04-26 NOTE — TELEPHONE ENCOUNTER
[facility] saw Mr. Soha Barrett today and he informed her that he will not be going back to see Dr. Kell Cheek so they wanted to know if you want him to have any lab work done ? Please advise.

## 2023-05-05 DIAGNOSIS — Z96.649 S/P HIP HEMIARTHROPLASTY: Primary | ICD-10-CM

## 2023-05-09 ENCOUNTER — OFFICE VISIT (OUTPATIENT)
Dept: ORTHOPEDIC SURGERY | Age: 73
End: 2023-05-09

## 2023-05-09 VITALS — WEIGHT: 135 LBS | HEIGHT: 69 IN | BODY MASS INDEX: 19.99 KG/M2 | TEMPERATURE: 98 F

## 2023-05-09 DIAGNOSIS — Z96.649 S/P HIP HEMIARTHROPLASTY: ICD-10-CM

## 2023-05-09 DIAGNOSIS — T81.31XA DEHISCENCE OF OPERATIVE WOUND, INITIAL ENCOUNTER: Primary | ICD-10-CM

## 2023-05-09 PROCEDURE — 99024 POSTOP FOLLOW-UP VISIT: CPT | Performed by: ORTHOPAEDIC SURGERY

## 2023-05-09 ASSESSMENT — ENCOUNTER SYMPTOMS
SORE THROAT: 0
GASTROINTESTINAL NEGATIVE: 1
SHORTNESS OF BREATH: 0
SINUS PRESSURE: 0
EYE REDNESS: 0
EYE DISCHARGE: 0
TROUBLE SWALLOWING: 0
SINUS PAIN: 0
WHEEZING: 0
RHINORRHEA: 0
EYE ITCHING: 0
COUGH: 0
VOICE CHANGE: 0

## 2023-05-09 ASSESSMENT — VISUAL ACUITY: OU: 1

## 2023-05-09 NOTE — PROGRESS NOTES
Chief Complaint   Patient presents with    Hip Pain     Left hip I&D f/u DOS 3/23/23. Still has pain in the hip         Mr. Britney Branch returns today for follow-up of a left femoral neck fracture which was treated with hemiarthroplasty and irrigation debridement and surgery closure of wound dehiscence. Date of surgery was 3/22/2023.  he reports doing much better now home with [address] to Parkinson's disease status. Physical Exam:  Left hip- nontender to palpation at the area of the fracture site. ROM   stiff but intact          The incision is healing well without evidence of infection. Pulses are intact and symmetric bilaterally         Strength 4/5         Sensation SI LT    Xrays:  XR HIP LEFT (2-3 VIEWS)    Result Date: 3/22/2023  EXAMINATION: TWO XRAY VIEWS OF THE LEFT HIP 3/22/2023 4:20 pm COMPARISON: None. HISTORY: ORDERING SYSTEM PROVIDED HISTORY: ro fx, dislocation TECHNOLOGIST PROVIDED HISTORY: Reason for exam:->ro fx, dislocation FINDINGS: Hemiarthroplasty in place. No acute fractures or software complications. Soft tissue swelling. There are scattered pockets of soft tissue gas in the lateral aspect of the hip. No evidence of acute bony abnormalities or prosthetic complications. Soft tissue swelling and soft tissue pockets of gas. XR HIP LEFT (2-3 VIEWS)    Result Date: 5/9/2023  EXAMINATION: TWO XRAY VIEWS OF THE LEFT HIP 5/9/2023 1:08 pm COMPARISON: Left hip April 11, 2023 HISTORY: ORDERING SYSTEM PROVIDED HISTORY: S/P hip hemiarthroplasty FINDINGS: Left hip prosthesis without radiographic complication. No acute bony process. No evidence of hardware complication. Radiographic findings reviewed with patient    Impression:   Rob Fisher was seen today for hip pain. Diagnoses and all orders for this visit:    Dehiscence of operative wound, initial encounter    S/P hip hemiarthroplasty              Plan:   Patient seen and examined.   Imaging  reviewed with patient

## 2023-05-15 ENCOUNTER — TELEPHONE (OUTPATIENT)
Dept: FAMILY MEDICINE CLINIC | Age: 73
End: 2023-05-15

## 2023-05-15 DIAGNOSIS — N30.01 ACUTE CYSTITIS WITH HEMATURIA: Primary | ICD-10-CM

## 2023-05-15 RX ORDER — SULFAMETHOXAZOLE AND TRIMETHOPRIM 800; 160 MG/1; MG/1
1 TABLET ORAL 2 TIMES DAILY
Qty: 14 TABLET | Refills: 0 | Status: SHIPPED | OUTPATIENT
Start: 2023-05-15

## 2023-05-15 NOTE — TELEPHONE ENCOUNTER
ASSESSMENT/PLAN:    1. Acute cystitis with hematuria  - sulfamethoxazole-trimethoprim (BACTRIM DS) 800-160 MG per tablet; Take 1 tablet by mouth 2 times daily  Dispense: 14 tablet; Refill: 0        FOLLOW-UP:  lets see how he does with this keep eye on mental status and fever, but mostly mental status. ,,,djf

## 2023-05-15 NOTE — TELEPHONE ENCOUNTER
Pts wife called stating her  has poss UTI, having discomfort, frequency and blood in urine. Unable to bring him in as to he is in a wheelchair and she has no one to help put him in the care this week. I asked her to bring a urine sample in and we can test it.

## 2023-06-19 DIAGNOSIS — Z96.649 S/P HIP HEMIARTHROPLASTY: Primary | ICD-10-CM

## 2023-06-20 ENCOUNTER — OFFICE VISIT (OUTPATIENT)
Dept: ORTHOPEDIC SURGERY | Age: 73
End: 2023-06-20

## 2023-06-20 VITALS — HEIGHT: 69 IN | WEIGHT: 135 LBS | TEMPERATURE: 98 F | BODY MASS INDEX: 19.99 KG/M2

## 2023-06-20 DIAGNOSIS — T81.31XA DEHISCENCE OF OPERATIVE WOUND, INITIAL ENCOUNTER: Primary | ICD-10-CM

## 2023-06-20 DIAGNOSIS — Z96.649 S/P HIP HEMIARTHROPLASTY: ICD-10-CM

## 2023-06-27 ENCOUNTER — OFFICE VISIT (OUTPATIENT)
Dept: FAMILY MEDICINE CLINIC | Age: 73
End: 2023-06-27
Payer: MEDICARE

## 2023-06-27 VITALS
TEMPERATURE: 97.2 F | BODY MASS INDEX: 19.94 KG/M2 | RESPIRATION RATE: 18 BRPM | HEIGHT: 69 IN | OXYGEN SATURATION: 99 % | HEART RATE: 63 BPM | DIASTOLIC BLOOD PRESSURE: 66 MMHG | SYSTOLIC BLOOD PRESSURE: 124 MMHG

## 2023-06-27 DIAGNOSIS — R35.0 URINARY FREQUENCY: Primary | ICD-10-CM

## 2023-06-27 DIAGNOSIS — N30.00 ACUTE CYSTITIS WITHOUT HEMATURIA: ICD-10-CM

## 2023-06-27 DIAGNOSIS — R35.0 URINARY FREQUENCY: ICD-10-CM

## 2023-06-27 DIAGNOSIS — G20 PARKINSON DISEASE (HCC): ICD-10-CM

## 2023-06-27 LAB
BILIRUBIN, POC: NEGATIVE
BLOOD URINE, POC: NEGATIVE
CLARITY, POC: NORMAL
COLOR, POC: YELLOW
GLUCOSE URINE, POC: NEGATIVE
KETONES, POC: NEGATIVE
LEUKOCYTE EST, POC: NORMAL
NITRITE, POC: NEGATIVE
PH, POC: 7
PROTEIN, POC: NEGATIVE
SPECIFIC GRAVITY, POC: 1.02
UROBILINOGEN, POC: NORMAL

## 2023-06-27 PROCEDURE — 99213 OFFICE O/P EST LOW 20 MIN: CPT | Performed by: FAMILY MEDICINE

## 2023-06-27 PROCEDURE — G8420 CALC BMI NORM PARAMETERS: HCPCS | Performed by: FAMILY MEDICINE

## 2023-06-27 PROCEDURE — 81002 URINALYSIS NONAUTO W/O SCOPE: CPT | Performed by: FAMILY MEDICINE

## 2023-06-27 PROCEDURE — 1123F ACP DISCUSS/DSCN MKR DOCD: CPT | Performed by: FAMILY MEDICINE

## 2023-06-27 PROCEDURE — G8427 DOCREV CUR MEDS BY ELIG CLIN: HCPCS | Performed by: FAMILY MEDICINE

## 2023-06-27 PROCEDURE — 1036F TOBACCO NON-USER: CPT | Performed by: FAMILY MEDICINE

## 2023-06-27 PROCEDURE — 3017F COLORECTAL CA SCREEN DOC REV: CPT | Performed by: FAMILY MEDICINE

## 2023-06-27 RX ORDER — SULFAMETHOXAZOLE AND TRIMETHOPRIM 800; 160 MG/1; MG/1
1 TABLET ORAL 2 TIMES DAILY
Qty: 14 TABLET | Refills: 0 | Status: SHIPPED | OUTPATIENT
Start: 2023-06-27

## 2023-06-27 ASSESSMENT — ENCOUNTER SYMPTOMS
CONSTIPATION: 0
DIARRHEA: 0
WHEEZING: 0
BLOOD IN STOOL: 0

## 2023-06-29 LAB — BACTERIA UR CULT: NORMAL

## 2023-07-03 LAB — NONINV COLON CA DNA+OCC BLD SCRN STL QL: NEGATIVE

## 2023-07-17 DIAGNOSIS — Z96.649 S/P HIP HEMIARTHROPLASTY: Primary | ICD-10-CM

## 2023-07-17 DIAGNOSIS — M25.562 LEFT KNEE PAIN, UNSPECIFIED CHRONICITY: ICD-10-CM

## 2023-07-20 ENCOUNTER — OFFICE VISIT (OUTPATIENT)
Dept: ORTHOPEDIC SURGERY | Age: 73
End: 2023-07-20

## 2023-07-20 VITALS — BODY MASS INDEX: 19.99 KG/M2 | HEIGHT: 69 IN | WEIGHT: 135 LBS | TEMPERATURE: 98 F

## 2023-07-20 DIAGNOSIS — Z96.649 S/P HIP HEMIARTHROPLASTY: ICD-10-CM

## 2023-07-20 DIAGNOSIS — G20 PARKINSON DISEASE (HCC): ICD-10-CM

## 2023-07-20 DIAGNOSIS — T81.31XA DEHISCENCE OF OPERATIVE WOUND, INITIAL ENCOUNTER: Primary | ICD-10-CM

## 2023-07-20 DIAGNOSIS — Z71.82 EXERCISE COUNSELING: ICD-10-CM

## 2023-07-20 DIAGNOSIS — Z78.9 ACTIVITY OF DAILY LIVING ALTERATION: ICD-10-CM

## 2023-07-20 NOTE — PROGRESS NOTES
Chief Complaint   Patient presents with    Hip Pain     Left Hip I&D DOS 03/23/2023, F/U     Knee Pain     Left TKA  09/01/2020         HPI:    Patient is 81 y.o. male here today for follow-up for left hip arthroplasty and left total knee arthroplasty. Patient is aware and able to comprehend but nonverbal today due to Parkinson's. Wife states he is done well with home health care but still having difficulty mobilizing due to Pre-existing condition. No new complaints today but still having significant deficits in ADLs. ROS:    Skin: (-) rash,(-) psoriasis,(-) eczema, (-)skin cancer. Neurologic: (-)numbness, (-)tingling, (-)headaches, (-) LOC. Cardiovascular: (-) Chest pain, (-) swelling in legs/feet, (-) SOB, (-) cramping in legs/feet with walking.     All other review of systems negative except stated above or in HPI      Past Medical History:   Diagnosis Date    Parkinson's disease [facility])      Past Surgical History:   Procedure Laterality Date    ARM SURGERY Left 3/23/2023    LEFT HIP  IRRIGATION AND DEBRIDEMENT WITH POSSIBLE EXCHANGE OF FEMORAL HEAD COMPONENT performed by Kristy Castro DO at [facility] chest    FRACTURE SURGERY      nose    HIP SURGERY Left 2/28/2023    LEFT HIP HEMIARTHROPLASTY (DEPUY) performed by Vasu Guerrero DO at [address] [place] [address] Right 05/16/2017    knee    KNEE ARTHROSCOPY Right     TONSILLECTOMY      TOTAL KNEE ARTHROPLASTY Left 9/1/2020    TOTAL LEFT KNEE REPLACEMENT/ ARTHROPLASTY (JOLENE) performed by Vasu Guerrero DO at [facility]       Current Outpatient Medications:     sulfamethoxazole-trimethoprim (BACTRIM DS) 800-160 MG per tablet, Take 1 tablet by mouth 2 times daily, Disp: 14 tablet, Rfl: 0    atropine 1 % ophthalmic solution, Place 1-2 drops under tongue up to two times daily for excess saliva, Disp: , Rfl:     aspirin 81 MG EC tablet, Take 1 tablet by mouth 2 times daily, Disp: 60 tablet, Rfl: 0    clotrimazole

## 2023-08-01 DIAGNOSIS — Z96.649 S/P HIP HEMIARTHROPLASTY: Primary | ICD-10-CM

## 2023-08-14 ENCOUNTER — OFFICE VISIT (OUTPATIENT)
Dept: FAMILY MEDICINE CLINIC | Age: 73
End: 2023-08-14
Payer: MEDICARE

## 2023-08-14 VITALS
DIASTOLIC BLOOD PRESSURE: 70 MMHG | WEIGHT: 84 LBS | RESPIRATION RATE: 16 BRPM | HEART RATE: 72 BPM | OXYGEN SATURATION: 98 % | BODY MASS INDEX: 12.4 KG/M2 | TEMPERATURE: 97.6 F | SYSTOLIC BLOOD PRESSURE: 110 MMHG

## 2023-08-14 DIAGNOSIS — G20 PARKINSON DISEASE (HCC): ICD-10-CM

## 2023-08-14 DIAGNOSIS — N30.00 ACUTE CYSTITIS WITHOUT HEMATURIA: ICD-10-CM

## 2023-08-14 DIAGNOSIS — R35.0 URINARY FREQUENCY: ICD-10-CM

## 2023-08-14 DIAGNOSIS — R35.0 URINARY FREQUENCY: Primary | ICD-10-CM

## 2023-08-14 LAB
BILIRUBIN, POC: NORMAL
BLOOD URINE, POC: NORMAL
CLARITY, POC: NORMAL
COLOR, POC: YELLOW
GLUCOSE URINE, POC: NORMAL
KETONES, POC: NORMAL
LEUKOCYTE EST, POC: NORMAL
NITRITE, POC: POSITIVE
PH, POC: 7.5
PROTEIN, POC: 30
SPECIFIC GRAVITY, POC: 1.02
UROBILINOGEN, POC: 0.2

## 2023-08-14 PROCEDURE — 1123F ACP DISCUSS/DSCN MKR DOCD: CPT | Performed by: FAMILY MEDICINE

## 2023-08-14 PROCEDURE — 3017F COLORECTAL CA SCREEN DOC REV: CPT | Performed by: FAMILY MEDICINE

## 2023-08-14 PROCEDURE — G8427 DOCREV CUR MEDS BY ELIG CLIN: HCPCS | Performed by: FAMILY MEDICINE

## 2023-08-14 PROCEDURE — G8419 CALC BMI OUT NRM PARAM NOF/U: HCPCS | Performed by: FAMILY MEDICINE

## 2023-08-14 PROCEDURE — 99213 OFFICE O/P EST LOW 20 MIN: CPT | Performed by: FAMILY MEDICINE

## 2023-08-14 PROCEDURE — 1036F TOBACCO NON-USER: CPT | Performed by: FAMILY MEDICINE

## 2023-08-14 PROCEDURE — 81002 URINALYSIS NONAUTO W/O SCOPE: CPT | Performed by: FAMILY MEDICINE

## 2023-08-14 RX ORDER — SULFAMETHOXAZOLE AND TRIMETHOPRIM 800; 160 MG/1; MG/1
1 TABLET ORAL 2 TIMES DAILY
Qty: 14 TABLET | Refills: 0 | Status: SHIPPED | OUTPATIENT
Start: 2023-08-14

## 2023-08-17 ENCOUNTER — TELEPHONE (OUTPATIENT)
Dept: FAMILY MEDICINE CLINIC | Age: 73
End: 2023-08-17

## 2023-08-17 DIAGNOSIS — N39.0 CHRONIC UTI (URINARY TRACT INFECTION): Primary | ICD-10-CM

## 2023-08-17 LAB
CULTURE: ABNORMAL
SPECIMEN DESCRIPTION: ABNORMAL

## 2023-08-17 RX ORDER — NITROFURANTOIN MACROCRYSTALS 100 MG/1
100 CAPSULE ORAL DAILY
Qty: 60 CAPSULE | Refills: 0 | Status: SHIPPED
Start: 2023-08-17 | End: 2023-10-06

## 2023-08-17 NOTE — TELEPHONE ENCOUNTER
Per Dr. Trung Laboy    Call Mrs. Edge Lose let her know that culture did show an infection. Dr. Trung Laboy called in a antibiotic for Herber Carrillo to start taking once a day after he finishes the twice a day antibiotic. Spoke to Suni, she expressed understanding.

## 2023-09-14 ENCOUNTER — OFFICE VISIT (OUTPATIENT)
Dept: FAMILY MEDICINE CLINIC | Age: 73
End: 2023-09-14
Payer: MEDICARE

## 2023-09-14 VITALS
OXYGEN SATURATION: 98 % | TEMPERATURE: 97.5 F | SYSTOLIC BLOOD PRESSURE: 106 MMHG | BODY MASS INDEX: 12.4 KG/M2 | DIASTOLIC BLOOD PRESSURE: 78 MMHG | HEIGHT: 69 IN | HEART RATE: 98 BPM | RESPIRATION RATE: 16 BRPM

## 2023-09-14 DIAGNOSIS — R35.0 URINARY FREQUENCY: Primary | ICD-10-CM

## 2023-09-14 LAB
BILIRUBIN, POC: NEGATIVE
BLOOD URINE, POC: ABNORMAL
CLARITY, POC: ABNORMAL
COLOR, POC: YELLOW
GLUCOSE URINE, POC: NEGATIVE
KETONES, POC: NEGATIVE
LEUKOCYTE EST, POC: ABNORMAL
NITRITE, POC: POSITIVE
PH, POC: 7
PROTEIN, POC: 30
SPECIFIC GRAVITY, POC: 1.02
UROBILINOGEN, POC: 0.2

## 2023-09-14 PROCEDURE — 1123F ACP DISCUSS/DSCN MKR DOCD: CPT | Performed by: FAMILY MEDICINE

## 2023-09-14 PROCEDURE — 1036F TOBACCO NON-USER: CPT | Performed by: FAMILY MEDICINE

## 2023-09-14 PROCEDURE — G8419 CALC BMI OUT NRM PARAM NOF/U: HCPCS | Performed by: FAMILY MEDICINE

## 2023-09-14 PROCEDURE — 3017F COLORECTAL CA SCREEN DOC REV: CPT | Performed by: FAMILY MEDICINE

## 2023-09-14 PROCEDURE — 81002 URINALYSIS NONAUTO W/O SCOPE: CPT | Performed by: FAMILY MEDICINE

## 2023-09-14 PROCEDURE — 99213 OFFICE O/P EST LOW 20 MIN: CPT | Performed by: FAMILY MEDICINE

## 2023-09-14 PROCEDURE — G8427 DOCREV CUR MEDS BY ELIG CLIN: HCPCS | Performed by: FAMILY MEDICINE

## 2023-09-14 ASSESSMENT — PATIENT HEALTH QUESTIONNAIRE - PHQ9
2. FEELING DOWN, DEPRESSED OR HOPELESS: 0
SUM OF ALL RESPONSES TO PHQ QUESTIONS 1-9: 0
SUM OF ALL RESPONSES TO PHQ9 QUESTIONS 1 & 2: 0
1. LITTLE INTEREST OR PLEASURE IN DOING THINGS: 0
SUM OF ALL RESPONSES TO PHQ QUESTIONS 1-9: 0

## 2023-09-14 ASSESSMENT — ENCOUNTER SYMPTOMS
ABDOMINAL PAIN: 0
VOMITING: 0
COUGH: 0
CONSTIPATION: 0
DIARRHEA: 0
BLOOD IN STOOL: 0
WHEEZING: 0
NAUSEA: 0
SHORTNESS OF BREATH: 0

## 2023-09-21 DIAGNOSIS — R35.0 URINARY FREQUENCY: ICD-10-CM

## 2023-09-23 LAB
CULTURE: ABNORMAL
SPECIMEN DESCRIPTION: ABNORMAL

## 2023-09-30 DIAGNOSIS — G20.A1 PARKINSON DISEASE: ICD-10-CM

## 2023-10-06 DIAGNOSIS — N39.0 CHRONIC UTI (URINARY TRACT INFECTION): ICD-10-CM

## 2023-10-06 RX ORDER — NITROFURANTOIN MACROCRYSTALS 100 MG/1
CAPSULE ORAL DAILY
Qty: 60 CAPSULE | Refills: 0 | Status: SHIPPED | OUTPATIENT
Start: 2023-10-06

## 2023-11-27 DIAGNOSIS — Z96.649 S/P HIP HEMIARTHROPLASTY: Primary | ICD-10-CM

## 2023-11-28 ENCOUNTER — OFFICE VISIT (OUTPATIENT)
Dept: ORTHOPEDIC SURGERY | Age: 73
End: 2023-11-28
Payer: MEDICARE

## 2023-11-28 VITALS — HEIGHT: 69 IN | BODY MASS INDEX: 12.4 KG/M2 | TEMPERATURE: 98 F

## 2023-11-28 DIAGNOSIS — Z71.82 EXERCISE COUNSELING: ICD-10-CM

## 2023-11-28 DIAGNOSIS — G20.A1 PARKINSON'S DISEASE, UNSPECIFIED WHETHER DYSKINESIA PRESENT, UNSPECIFIED WHETHER MANIFESTATIONS FLUCTUATE: ICD-10-CM

## 2023-11-28 DIAGNOSIS — Z78.9 ACTIVITY OF DAILY LIVING ALTERATION: ICD-10-CM

## 2023-11-28 DIAGNOSIS — Z96.649 S/P HIP HEMIARTHROPLASTY: Primary | ICD-10-CM

## 2023-11-28 DIAGNOSIS — R26.81 GAIT INSTABILITY: ICD-10-CM

## 2023-11-28 PROCEDURE — G8484 FLU IMMUNIZE NO ADMIN: HCPCS | Performed by: ORTHOPAEDIC SURGERY

## 2023-11-28 PROCEDURE — 3017F COLORECTAL CA SCREEN DOC REV: CPT | Performed by: ORTHOPAEDIC SURGERY

## 2023-11-28 PROCEDURE — 1036F TOBACCO NON-USER: CPT | Performed by: ORTHOPAEDIC SURGERY

## 2023-11-28 PROCEDURE — G8419 CALC BMI OUT NRM PARAM NOF/U: HCPCS | Performed by: ORTHOPAEDIC SURGERY

## 2023-11-28 PROCEDURE — 99214 OFFICE O/P EST MOD 30 MIN: CPT | Performed by: ORTHOPAEDIC SURGERY

## 2023-11-28 PROCEDURE — G8427 DOCREV CUR MEDS BY ELIG CLIN: HCPCS | Performed by: ORTHOPAEDIC SURGERY

## 2023-11-28 PROCEDURE — 1123F ACP DISCUSS/DSCN MKR DOCD: CPT | Performed by: ORTHOPAEDIC SURGERY

## 2023-12-06 ENCOUNTER — EVALUATION (OUTPATIENT)
Dept: PHYSICAL THERAPY | Age: 73
End: 2023-12-06
Payer: MEDICARE

## 2023-12-06 DIAGNOSIS — Z96.649 S/P HIP HEMIARTHROPLASTY: Primary | ICD-10-CM

## 2023-12-06 PROCEDURE — 97163 PT EVAL HIGH COMPLEX 45 MIN: CPT | Performed by: PHYSICAL THERAPIST

## 2023-12-06 NOTE — PROGRESS NOTES
[facility] OUTPATIENT REHABILITATION  PHYSICAL THERAPY INITIAL EVALUATION         Date:  2023   Patient: Rene Dobbs  : 1950  MRN: [id number]  Referring Provider: Mica Moreira DO   [address] [place] [address]     Medical Diagnosis:   L51.857 (ICD-10-CM) - S/P hip hemiarthroplasty   Z78.9 (ICD-10-CM) - Activity of daily living alteration       Physician Order: Eval and Treat     SUBJECTIVE:     History: fell  L hip fx, had hemiarthroplasty 2023, fell dehiscing incision at [facility] [place] requiring revision. Chief complaint: difficulty walking    Pain:   Current: 0/10       Imaging results: XR HIP LEFT (2-3 VIEWS)    Result Date: 2023  EXAMINATION: TWO XRAY VIEWS OF THE LEFT HIP AND ONE VIEW OF THE PELVIS 2023 1:31 pm COMPARISON: Hip radiographs 2023 HISTORY: ORDERING SYSTEM PROVIDED HISTORY: S/P hip hemiarthroplasty FINDINGS: Total left hip arthroplasty. No hardware complication or fracture. Soft tissue dystrophic calcifications and atherosclerotic vascular disease. Partially visualized contralateral hip is unchanged. Total left hip arthroplasty without evidence of complication or fracture.        Past Medical History:  Past Medical History:   Diagnosis Date    Parkinson's disease [facility])      Past Surgical History:   Procedure Laterality Date    ARM SURGERY Left 3/23/2023    LEFT HIP  IRRIGATION AND DEBRIDEMENT WITH POSSIBLE EXCHANGE OF FEMORAL HEAD COMPONENT performed by Mica Moreira DO at [facility] chest    FRACTURE SURGERY      nose    HIP SURGERY Left 2023    LEFT HIP HEMIARTHROPLASTY (DEPUY) performed by Basilio Gutierrez DO at [address] [place] [address] Right 2017    knee    KNEE ARTHROSCOPY Right     TONSILLECTOMY      TOTAL KNEE ARTHROPLASTY Left 2020    TOTAL LEFT KNEE REPLACEMENT/ ARTHROPLASTY (JOLENE) performed by Basilio Gutierrez DO at [facility]

## 2023-12-08 DIAGNOSIS — N39.0 CHRONIC UTI (URINARY TRACT INFECTION): ICD-10-CM

## 2023-12-08 RX ORDER — NITROFURANTOIN MACROCRYSTALS 100 MG/1
100 CAPSULE ORAL DAILY
Qty: 30 CAPSULE | Refills: 1 | Status: SHIPPED | OUTPATIENT
Start: 2023-12-08

## 2023-12-11 ENCOUNTER — TREATMENT (OUTPATIENT)
Dept: PHYSICAL THERAPY | Age: 73
End: 2023-12-11
Payer: MEDICARE

## 2023-12-11 DIAGNOSIS — Z96.649 S/P HIP HEMIARTHROPLASTY: Primary | ICD-10-CM

## 2023-12-11 PROCEDURE — 97530 THERAPEUTIC ACTIVITIES: CPT

## 2023-12-11 PROCEDURE — 97140 MANUAL THERAPY 1/> REGIONS: CPT

## 2023-12-11 NOTE — PROGRESS NOTES
Physical Therapy Daily Treatment Note    Date: 2023  Patient Name: Domingo Melo  : 1950   MRN: [id number]  DOInjury: 2023   DOSx: 2023  Referring Provider: Suzie Jin DO   [address] [place] [address]     Medical Diagnosis:     A93.620 (ICD-10-CM) - S/P hip hemiarthroplasty   Z78.9 (ICD-10-CM) - Activity of daily living alteration       Berneta Frame", fractured his hip 2023 and underwent hemiarthroplasty. He has severe Parkinson's disease which is the reason he fell and the biggest barrier to Dustin's progress. We will work on stretching knee and hip extension as well as standing exercises to improve his gait and balance. X = TO BE PERFORMED NEXT VISIT  > = PROGRESS TO THIS    S: See eval  O: Discussed anatomy, physiology, body mechanics, principles of loading, and progressive loading/activity. Time 0612-6865     Visit 1 Repeat outcome measure at mid point and end. Pain Pain at rest 0/10     ROM      Modalities         MO   Manual            Stretch      Knee extension stretch supine x  TE   Hip extension stretch supine x  TE   Exercise      Ball / can rolling   TE   Toe curls      Heel slides   TE   QS   TE   SLR   TE   SAQ   TE   [] TG  [] Leg Press 2-leg   TE   [] TG  [] Leg Press 1-leg   TE   Hamstring Curl Machine   TE   Knee Extension Machine   TE   Step-ups - FWD   TA   Step-ups - LAT   TA   Step-ups - BWD   TA   Calf Raises x  TA   Marching x  TA   gait x  TA               A:  Tolerated well.     P: Continue with rehab plan  Jose Ramon Seo PT    Treatment Charges: Mins Units   Initial Evaluation 45 1   Re-Evaluation     Ther Exercise         TE     Manual Therapy     MT     Ther Activities        TA     Gait Training          GT     Neuro Re-education NR     Modalities     Non-Billable Service Time     Other     Total Time/Units 45 1

## 2023-12-11 NOTE — PROGRESS NOTES
Physical Therapy Daily Treatment Note    Date: 2023  Patient Name: Lawson Sanford         \" Call him Manish Kumari"   : 1950   MRN: [id number]  DOInjury: 2023   DOSx: 2023  Referring Provider: Cristopher Higginbotham DO   [address] [place] [address],  [address] [place] [address]     Medical Diagnosis:     I36.903 (ICD-10-CM) - S/P hip hemiarthroplasty   Z78.9 (ICD-10-CM) - Activity of daily living alteration       Leanne Sandhu", fractured his hip 2023 and underwent hemiarthroplasty. He has severe Parkinson's disease which is the reason he fell and the biggest barrier to Dustin's progress. We will work on stretching knee and hip extension as well as standing exercises to improve his gait and balance. X = TO BE PERFORMED NEXT VISIT  > = PROGRESS TO THIS    S: pt reports feeling ok with 0/10 c/o soreness/pain today . ( Pt's wife was present for full treatment session ). O: Discussed anatomy, physiology, body mechanics, principles of loading, and progressive loading/activity. Time 4227-9738     Visit  visits  Repeat outcome measure at mid point and end. Pain Pain at rest 0/10     ROM      Modalities         MO   Manual            Stretch      Knee extension stretch supine X 5 min   MT   Hip extension stretch supine X  5 min  MT   Heel prop  X 5 min new      Exercise      Ball / can rolling   TE   Toe curls      Heel slides   TE   QS   TE   SLR   TE   SAQ   TE   [] TG  [] Leg Press 2-leg   TE   [] TG  [] Leg Press 1-leg   TE   Hamstring Curl Machine   TE   Knee Extension Machine   TE   Step-ups - FWD   TA   Step-ups - LAT   TA   Step-ups - BWD   TA         Seated hip Abduction back to neutral in w/c   2 x 10 reps R/L      Calf Raises x  TE   Marching  2 X  15 seated in w/c with manual assist to keep both legs abducted in correct position   TA   gait 135 fee x 1 using wheeled walker with moderate assist x 1 for safety and to maintain correct position in walker .  w/c follow for safety   TA         Bed mobility

## 2023-12-14 ENCOUNTER — TREATMENT (OUTPATIENT)
Dept: PHYSICAL THERAPY | Age: 73
End: 2023-12-14

## 2023-12-14 DIAGNOSIS — Z96.649 S/P HIP HEMIARTHROPLASTY: Primary | ICD-10-CM

## 2023-12-14 NOTE — PROGRESS NOTES
Physical Therapy Daily Treatment Note    Date: 2023  Patient Name: Isha Bruner         \" Call him Danika Berry"   : 1950   MRN: [id number]  DOInjury: 2023   DOSx: 2023  Referring Provider: Priya Philippe DO   [address]     Medical Diagnosis:     E22.791 (ICD-10-CM) - S/P hip hemiarthroplasty   Z78.9 (ICD-10-CM) - Activity of daily living alteration       Kurt Alexander", fractured his hip 2023 and underwent hemiarthroplasty. He has severe Parkinson's disease which is the reason he fell and the biggest barrier to Dustin's progress. We will work on stretching knee and hip extension as well as standing exercises to improve his gait and balance. X = TO BE PERFORMED NEXT VISIT  > = PROGRESS TO THIS    S: pt reports feeling ok with 0/10 c/o soreness/pain today . ( Pt's wife was present for full treatment session ). O: Discussed anatomy, physiology, body mechanics, principles of loading, and progressive loading/activity. Time 8298-1190     Visit 3/12 visits  Repeat outcome measure at mid point and end.     Pain Pain at rest 0/10     ROM      Modalities         MO   Manual            Stretch      Knee extension stretch seated  X 5 min   MT   Hip extension stretch  seated  MT   Heel prop  seated on metal stool from gym  X 5  min manual assist needed to maintain neutral hip position      Bilateral hip ABD stretch seated from w/c Soft round cushion between knees x 3 min      Exercise      Ball / can rolling   TE   Toe curls      Heel slides   TE   QS   TE   SLR   TE   SAQ   TE   [] TG  [] Leg Press 2-leg   TE   [] TG  [] Leg Press 1-leg   TE   Hamstring Curl Machine   TE   Knee Extension Machine   TE   Step-ups - FWD   TA   Step-ups - LAT   TA   Step-ups - BWD   TA         Seated hip Abduction back to neutral in w/c   2 x 10 reps R/L moderate assist x 1      Calf Raises x  TE   Marching  2 X  15 seated in w/c with manual assist to keep both legs abducted in correct position

## 2023-12-28 ENCOUNTER — TREATMENT (OUTPATIENT)
Dept: PHYSICAL THERAPY | Age: 73
End: 2023-12-28
Payer: MEDICARE

## 2023-12-28 DIAGNOSIS — Z96.649 S/P HIP HEMIARTHROPLASTY: Primary | ICD-10-CM

## 2023-12-28 PROCEDURE — 97530 THERAPEUTIC ACTIVITIES: CPT

## 2023-12-28 PROCEDURE — 97140 MANUAL THERAPY 1/> REGIONS: CPT

## 2023-12-28 PROCEDURE — 97112 NEUROMUSCULAR REEDUCATION: CPT

## 2023-12-28 NOTE — PROGRESS NOTES
using wheeled walker with minimal/moderate assist x 1 for safety and to maintain correct position in walker . Pt has a constant scissoring step patten . w/c follow by pt's wife for safety   TA         Bed mobility supine<>sit on mat   TA   A: Tolerated well with continued gait ex's also newly added sit to stands as listed. Gait pattern improved today as well as his ability to stay on task and perform exercises efficiently. Highly recommend patient to perform treatment in a ely  from gym. Patient becomes easily over-stimulated where he required multiple cues and direction as well as a quiet space with no distractions. Pt ambulates with a Parkinson's and scissoring step pattern where he requires use of pt's own gait belt for safety. Pt able to tolerate all ex's with need of manual assist for stretches and maintaining correct position with all ex's. Provided HEP and strongly encouraged performing 2-3x a day. P: Continue with rehab plan . RTD ortho 1/09/2024.    Chito Andrews PTA    Treatment Charges: Mins Units   Initial Evaluation     Re-Evaluation     Ther Exercise         TE     Manual Therapy     MT 15 1   Ther Activities        TA 20 1   Gait Training          GT     Neuro Re-education NR 15 1   Modalities     Non-Billable Service Time     Other     Total Time/Units 50 3

## 2024-01-01 NOTE — PROGRESS NOTES
Goal Outcome Evaluation:                                               Left     X  Right       Left Right Comment   [facility]ive range [facility] motion [facility]      Active range of motion [facility]      Muscle Grade 4/5 4/5     /pinch Strength Intact  Intact      Additional Information: Good  and wfl FMC/dexterity noted during ADL tasks Good  and wfl FMC/dexterity noted during ADL tasks        Functional Assessment:    Initial Eval Status  Date: 9/1/2020 Treatment Status  Date: 9/2/2020 STGs = LTGs  Time frame: 5-7 days   Feeding Independent   Independent to bring cup to mouth to take medication at beginning of session Independent    Grooming Maximal Assist at sink level   Mod A when standing sinkside in clinic to simulate grooming tasks Independent at sink level    UB Dressing Moderate Assist to doff and don hospital gown x 2 reps due to urinary incontinence   Mod A to adjust gowns during transfers d/t unsteadiness; tremoring Independent    LB Dressing Dependent to doff socks   Dep to adjust socks Independent    Bathing Maximal Assist  Pt education/instruction, demonstration and participation with use of DME in clinic for bathroom safety/safe transfers. Pt able to support B LE's to/from simulated tub/shower. Pt required assist for safe transfers d/t decreased stability and tremoring; wife will assist pt at home; family has extended tub bench at home     [facility] for hygiene and to place urinal  Pt used urinal when seated EOB with assist for safety at EOB; pt impulsive on wanting to stand without assist and was tremoring Independent    Bed Mobility  Supine to sit: Moderate Assist   Scooting: Moderate Assist  Sit to supine: Moderate Assist   Pt seated in chair on arrival; Min A with assist to support LLE to supine; assist to don SCD's on RTB Supine to sit:  Independent   Sit to supine: Independent    Functional Transfers Maximal Assist x 2 from EOB x 2 reps  Mod A x 2 progressing to Min A for sit to stand transfers to/from chair, EOB to use urinal, w/c and multiple surfaces in clinic with min A/Mod A with FWW; posterior lean noted intermittently; pt required verbal and tactile cuing for follow through with increased time noted d/t increased processing time d/t parkinson's disease; simulated car transfer at min A with assist to support LLE to/from car; transfer from w/c to EOB at min A with FWW Independent    Functional Mobility N/T  Mod a x 2 progressing to mod A/min A with FWW; posterior lean noted intermittently; verbal and tactile cuing for follow through Modified Floyd    Activity Tolerance Fair minus   fair minus; pt limited d/t fatigue/pain, although willing to participate in tx Good         Comments: Patient cleared by nursing staff. Upon arrival pt seated in chair Pt agreeable to OT tx session. Pt required assist of 2 initially d/t unsteadiness and posterior lean, progressing to min/mod A for upright position; pt required verbal and tactile cuing for transfers. Min/mod rest breaks provided d/t decreased tolerance. Pt educated with regards to bathroom safety, DME, functional transfers and functional mobility, bed mobility, hand placement, safety awareness, static sitting balance,  standing balance, simulated grooming tasks, toileting with urinal,  LE/UE dressing, ECT's. At end of session pt supine in bed with positioning provided; SCD's donned; all lines and tubes intact, call light within reach. Overall, pt demonstrated decreased independence and safety during completion of ADL/functional transfers/mobility tasks. Pt would benefit from continued skilled OT to increase safety and independence with completion of ADL/IADL tasks for functional independence and quality of life.     · Pt has made fair/fair minus progress towards set goals as noted through:  · Pt required assist for balance d/t unsteadiness and tremoring noted  · pt able to complete bathroom safety/DME transfers in clinic with assist for safety; verbal and tactile cuing for follow through; increased time d/t decreased pace 2* to PD  · Instruction/training on safety and adapted techniques for transfer training, hand placement, walker safety, sequencing   · through completion of bed mobility, transfer training, functional mobility, car, tub/shower and toilet transfers in clinic.   Min/Mod verbal and tactile cuing for safety awareness, joint protection  · Continue with current plan of care    Treatment Time In:9:00 AM    Treatment Time Out:10:17 AM           Treatment Charges: Mins Units   ADL/Home Mgt     [facility]     Thera Activities     [id number] [phone]   Ther Ex                 00630     Manual Therapy    [phone]     Neuro Re-ed         84830     Orthotic manage/training                               [id number]     Non Billable Time 23 0   Total Timed Treatment 77 [address] [facility], [address] [place] [address]

## 2024-01-04 ENCOUNTER — TREATMENT (OUTPATIENT)
Dept: PHYSICAL THERAPY | Age: 74
End: 2024-01-04

## 2024-01-04 DIAGNOSIS — Z96.649 S/P HIP HEMIARTHROPLASTY: Primary | ICD-10-CM

## 2024-01-04 NOTE — PROGRESS NOTES
Physical Therapy Daily Treatment Note    Date: 2023  Patient Name: Jaron Alvarado         \" Call him Dustin \"   : 1950   MRN: [id number]  DOInjury: 2023   DOSx: 2023    Referring Provider:   Semaj Dorantes DO   [facility] [place] [address]       Medical Diagnosis:   Z96.649 (ICD-10-CM) - S/P Left  hip hemiarthroplasty   Z78.9 (ICD-10-CM) - Activity of daily living alteration     Jaron, \"DUSTIN\", fractured his hip 2023 and underwent hemiarthroplasty. He has severe Parkinson's disease which is the reason he fell and the biggest barrier to Dustin's progress. We will work on stretching knee and hip extension as well as standing exercises to improve his gait and balance.    X = TO BE PERFORMED NEXT VISIT  > = PROGRESS TO THIS    S: Patient's wife present and stated patient doing better this week.  O: Discussed anatomy, physiology, body mechanics, principles of loading, and progressive loading/activity.   Time 1400 -1450     Visit  visits  Repeat outcome measure at mid point and end.    Pain Pain at rest 0/10     ROM      Modalities         MO   Manual            Stretch      Knee extension stretch seated   MT   Hip extension stretch  seated  MT   Heel prop seated on metal stool from gym  X 8 min 5# weight onto chair  manual assist needed to maintain neutral hip position initially and then was able to maintain on own     Bilateral hip ABD stretch seated from w/c     Exercise      Ball / can rolling   TE   Toe curls      Heel slides   TE   QS   TE   SLR   TE   [] TG  [] Leg Press 2-leg   TE   [] TG  [] Leg Press 1-leg   TE   Hamstring Curl Machine   TE   Knee Extension Machine   TE   Step-ups - FWD   TA   Step-ups - LAT   TA   Step-ups - BWD   TA   Sit to stands from w/c  2 x 15 in  w/c against leg press for support with min/mod  assist x 1 for safety      LAQ 2 x 15     Seated Marching  2 x 15      Seated hip Abduction back to neutral in w/c 2 x 15      Standing Calf Raises  TE

## 2024-01-05 NOTE — PROGRESS NOTES
Chief Complaint   Patient presents with    Hip Pain     Left CHIDI f/u doing well. Therapy is helping.         HPI:    Patient is 73 y.o. male here today for follow-up for left hip arthroplasty and left total knee arthroplasty.  Patient is aware and able to comprehend but nonverbal today due to Parkinson's.  Wife states he is done well with home health care but still having difficulty mobilizing due to Pre-existing condition.  No new complaints today but still having significant deficits in ADLs which has been improving with outpatient PT.      ROS:    Skin: (-) rash,(-) psoriasis,(-) eczema, (-)skin cancer.  Neurologic: (-)numbness, (-)tingling, (-)headaches, (-) LOC.  Cardiovascular: (-) Chest pain, (-) swelling in legs/feet, (-) SOB, (-) cramping in legs/feet with walking.    All other review of systems negative except stated above or in HPI      Past Medical History:   Diagnosis Date    Parkinson's disease (HCC)      Past Surgical History:   Procedure Laterality Date    ARM SURGERY Left 3/23/2023    LEFT HIP  IRRIGATION AND DEBRIDEMENT WITH POSSIBLE EXCHANGE OF FEMORAL HEAD COMPONENT performed by Semaj Dorantes DO at [facility] OR    DEEP BRAIN STIMULATOR PLACEMENT      L chest    FRACTURE SURGERY      nose    HIP SURGERY Left 2/28/2023    LEFT HIP HEMIARTHROPLASTY (DEPUY) performed by PAULETTE Sy DO at [facility] OR    JOINT REPLACEMENT Right 05/16/2017    knee    KNEE ARTHROSCOPY Right     TONSILLECTOMY      TOTAL KNEE ARTHROPLASTY Left 9/1/2020    TOTAL LEFT KNEE REPLACEMENT/ ARTHROPLASTY (JOLENE) performed by PAULETTE Sy DO at [facility] OR       Current Outpatient Medications:     nitrofurantoin (MACRODANTIN) 100 MG capsule, Take 1 capsule by mouth daily, Disp: 30 capsule, Rfl: 1    carbidopa-levodopa (SINEMET)  MG per tablet, TAKE 2 TABLETS BY MOUTH 4 TIMES DAILY, Disp: 240 tablet, Rfl: 0    MAGNESIUM PO, Take by mouth, Disp: , Rfl:     atropine 1 % ophthalmic solution, Place 1-2 drops under tongue up to two

## 2024-01-09 ENCOUNTER — OFFICE VISIT (OUTPATIENT)
Dept: ORTHOPEDIC SURGERY | Age: 74
End: 2024-01-09
Payer: MEDICARE

## 2024-01-09 VITALS — TEMPERATURE: 98 F | BODY MASS INDEX: 12.44 KG/M2 | HEIGHT: 69 IN | WEIGHT: 84 LBS

## 2024-01-09 DIAGNOSIS — G20.A1 PARKINSON'S DISEASE, UNSPECIFIED WHETHER DYSKINESIA PRESENT, UNSPECIFIED WHETHER MANIFESTATIONS FLUCTUATE: ICD-10-CM

## 2024-01-09 DIAGNOSIS — R26.81 GAIT INSTABILITY: ICD-10-CM

## 2024-01-09 DIAGNOSIS — T81.31XS DEHISCENCE OF OPERATIVE WOUND, SEQUELA: ICD-10-CM

## 2024-01-09 DIAGNOSIS — G20.B1 PARKINSON'S DISEASE WITH DYSKINESIA, UNSPECIFIED WHETHER MANIFESTATIONS FLUCTUATE: ICD-10-CM

## 2024-01-09 DIAGNOSIS — Z78.9 ACTIVITY OF DAILY LIVING ALTERATION: ICD-10-CM

## 2024-01-09 DIAGNOSIS — Z71.82 EXERCISE COUNSELING: ICD-10-CM

## 2024-01-09 DIAGNOSIS — Z96.649 S/P HIP HEMIARTHROPLASTY: Primary | ICD-10-CM

## 2024-01-09 PROCEDURE — 99214 OFFICE O/P EST MOD 30 MIN: CPT | Performed by: ORTHOPAEDIC SURGERY

## 2024-01-09 PROCEDURE — 1036F TOBACCO NON-USER: CPT | Performed by: ORTHOPAEDIC SURGERY

## 2024-01-09 PROCEDURE — 1123F ACP DISCUSS/DSCN MKR DOCD: CPT | Performed by: ORTHOPAEDIC SURGERY

## 2024-01-09 PROCEDURE — G8484 FLU IMMUNIZE NO ADMIN: HCPCS | Performed by: ORTHOPAEDIC SURGERY

## 2024-01-09 PROCEDURE — G8419 CALC BMI OUT NRM PARAM NOF/U: HCPCS | Performed by: ORTHOPAEDIC SURGERY

## 2024-01-09 PROCEDURE — G8427 DOCREV CUR MEDS BY ELIG CLIN: HCPCS | Performed by: ORTHOPAEDIC SURGERY

## 2024-01-09 PROCEDURE — 3017F COLORECTAL CA SCREEN DOC REV: CPT | Performed by: ORTHOPAEDIC SURGERY

## 2024-01-11 ENCOUNTER — TREATMENT (OUTPATIENT)
Dept: PHYSICAL THERAPY | Age: 74
End: 2024-01-11

## 2024-01-11 DIAGNOSIS — Z96.649 S/P HIP HEMIARTHROPLASTY: Primary | ICD-10-CM

## 2024-01-11 NOTE — PROGRESS NOTES
Physical Therapy Daily Treatment Note    Date: 2023  Patient Name: Jaron Alvarado         \" Call him Dustin \"   : 1950   MRN: [id number]  DOInjury: 2023   DOSx: 2023    Referring Provider:   Semaj Dorantes DO   [facility] [place] [address]       Medical Diagnosis:   Z96.649 (ICD-10-CM) - S/P Left  hip hemiarthroplasty   Z78.9 (ICD-10-CM) - Activity of daily living alteration     Jaron, \"DUSTIN\", fractured his hip 2023 and underwent hemiarthroplasty. He has severe Parkinson's disease which is the reason he fell and the biggest barrier to Dustin's progress. We will work on stretching knee and hip extension as well as standing exercises to improve his gait and balance.    X = TO BE PERFORMED NEXT VISIT  > = PROGRESS TO THIS    S: Patient's wife states  pt having more difficulty today with transfers sit <> stand from w/c .O: Discussed anatomy, physiology, body mechanics, principles of loading, and progressive loading/activity.   Time 1400 -1450     Visit  visits  Repeat outcome measure at mid point and end.    Pain Pain at rest 0/10     ROM      Modalities         MO   Manual            Stretch      Knee extension stretch seated   MT   Hip extension stretch  seated  MT   Heel prop seated on metal stool from gym  X 8 min 5# weight onto chair  manual assist needed to maintain neutral hip position initially and then was able to maintain on own     Bilateral hip ABD stretch seated from w/c     Exercise      Ball / can rolling   TE   Toe curls      Heel slides   TE   QS   TE   SLR   TE   [] TG  [] Leg Press 2-leg   TE   [] TG  [] Leg Press 1-leg   TE   Hamstring Curl Machine   TE   Knee Extension Machine   TE   Step-ups - FWD   TA   Step-ups - LAT   TA   Step-ups - BWD   TA   Shoulder flexion seated       Reach and touch to leg press bars       Sit to stands from w/c  2 x 15 in  w/c against leg press for support with min/mod  assist x 1 for safety      LAQ 2 x 15     Seated Marching  2 x

## 2024-01-15 ENCOUNTER — TELEPHONE (OUTPATIENT)
Dept: PHYSICAL THERAPY | Age: 74
End: 2024-01-15

## 2024-01-19 DIAGNOSIS — G20.A1 PARKINSON DISEASE: ICD-10-CM

## 2024-01-22 ENCOUNTER — TREATMENT (OUTPATIENT)
Dept: PHYSICAL THERAPY | Age: 74
End: 2024-01-22
Payer: MEDICARE

## 2024-01-22 DIAGNOSIS — Z96.649 S/P HIP HEMIARTHROPLASTY: Primary | ICD-10-CM

## 2024-01-22 PROCEDURE — 97112 NEUROMUSCULAR REEDUCATION: CPT

## 2024-01-22 PROCEDURE — 97140 MANUAL THERAPY 1/> REGIONS: CPT

## 2024-01-22 PROCEDURE — 97530 THERAPEUTIC ACTIVITIES: CPT

## 2024-01-22 NOTE — PROGRESS NOTES
Physical Therapy Daily Treatment Note    Date: 2023  Patient Name: Jaron Alvarado         \" Call him Dustin \"   : 1950   MRN: [id number]  DOInjury: 2023   DOSx: 2023    Referring Provider:   Semaj Dorantes DO   [facility] [place] [address]       Medical Diagnosis:   Z96.649 (ICD-10-CM) - S/P Left  hip hemiarthroplasty   Z78.9 (ICD-10-CM) - Activity of daily living alteration     Jaron, \"DUSTIN\", fractured his hip 2023 and underwent hemiarthroplasty. He has severe Parkinson's disease which is the reason he fell and the biggest barrier to Dustin's progress. We will work on stretching knee and hip extension as well as standing exercises to improve his gait and balance.    X = TO BE PERFORMED NEXT VISIT  > = PROGRESS TO THIS    S: Patient's wife  AND  pt doing better today .O: Discussed anatomy, physiology, body mechanics, principles of loading, and progressive loading/activity.   Time 1330-8123     Visit  visits  Repeat outcome measure at mid point and end.    Pain Pain at rest 0/10     ROM      Modalities         MO   Manual            Stretch      Knee extension stretch seated  X 10 min   MT   Hip extension stretch  seated  MT   Heel prop seated on metal stool from gym      Bilateral hip ABD stretch seated from w/c     Exercise      Ball / can rolling   TE   Toe curls      Heel slides   TE   QS   TE   SLR   TE   [] TG  [] Leg Press 2-leg   TE   [] TG  [] Leg Press 1-leg   TE   Hamstring Curl Machine   TE   Knee Extension Machine   TE   Step-ups - FWD   TA   Step-ups - LAT   TA   Step-ups - BWD   TA   Shoulder flexion seated       Reach and touch to leg press bars       Sit to stands from w/c  2 x 15 in  w/c against leg press for support with min/mod  assist x 1 for safety      LAQ 2 x 15 AAROM      Seated Marching  2 x 15 AAROM      Seated hip Abduction back to neutral in w/c 2 x 15 AAROM      Standing Calf Raises  TE   Standing Marching 2 x 10 using leg press bar      Gait 75

## 2024-01-29 ENCOUNTER — TREATMENT (OUTPATIENT)
Dept: PHYSICAL THERAPY | Age: 74
End: 2024-01-29
Payer: MEDICARE

## 2024-01-29 DIAGNOSIS — Z96.649 S/P HIP HEMIARTHROPLASTY: Primary | ICD-10-CM

## 2024-01-29 DIAGNOSIS — N39.0 CHRONIC UTI (URINARY TRACT INFECTION): ICD-10-CM

## 2024-01-29 PROCEDURE — 97530 THERAPEUTIC ACTIVITIES: CPT

## 2024-01-29 PROCEDURE — 97140 MANUAL THERAPY 1/> REGIONS: CPT

## 2024-01-29 PROCEDURE — 97112 NEUROMUSCULAR REEDUCATION: CPT

## 2024-01-29 RX ORDER — NITROFURANTOIN MACROCRYSTALS 100 MG/1
100 CAPSULE ORAL DAILY
Qty: 30 CAPSULE | Refills: 0 | Status: SHIPPED | OUTPATIENT
Start: 2024-01-29

## 2024-01-29 NOTE — PROGRESS NOTES
Physical Therapy Daily Treatment Note    Date: 2023  Patient Name: Jaron Alvarado         \" Call him Dustin \"   : 1950   MRN: [id number]  DOInjury: 2023   DOSx: 2023    Referring Provider:   Semaj Dorantes DO   [facility] [place] [address]       Medical Diagnosis:   Z96.649 (ICD-10-CM) - S/P Left  hip hemiarthroplasty   Z78.9 (ICD-10-CM) - Activity of daily living alteration     Jaron, \"DUSTIN\", fractured his hip 2023 and underwent hemiarthroplasty. He has severe Parkinson's disease which is the reason he fell and the biggest barrier to Dustin's progress. We will work on stretching knee and hip extension as well as standing exercises to improve his gait and balance.    X = TO BE PERFORMED NEXT VISIT  > = PROGRESS TO THIS    S: Patient's wife  AND  pt report doing good today .O: Discussed anatomy, physiology, body mechanics, principles of loading, and progressive loading/activity.   Time 6232-9461      Visit 10 /12 visits  Repeat outcome measure at mid point and end.    Pain Pain at rest 0/10     ROM      Modalities         MO   Manual            Stretch      Knee extension stretch seated  X 10 min   MT   Hip extension stretch  seated  MT   Heel prop seated on metal stool from gym      Bilateral hip ABD stretch seated from w/c     Exercise      Ball / can rolling   TE   Toe curls      Heel slides   TE   QS   TE   SLR   TE   [] TG  [] Leg Press 2-leg   TE   [] TG  [] Leg Press 1-leg   TE   Hamstring Curl Machine   TE   Knee Extension Machine   TE   Step-ups - FWD   TA   Step-ups - LAT   TA   Step-ups - BWD   TA   Shoulder flexion seated       Reach and touch to leg press bars       Sit to stands from w/c  2 x 15 in  w/c against leg press for support with min/mod  assist x 1 for safety      LAQ 2 x 15 AAROM      Seated Marching      Seated hip Abduction 1 x 10 at leg press bar      Standing Calf Raises  TE   Standing Marching 2 x 10 using leg press bar      Gait 75  feet x 2 using wheeled

## 2024-02-05 ENCOUNTER — TREATMENT (OUTPATIENT)
Dept: PHYSICAL THERAPY | Age: 74
End: 2024-02-05
Payer: MEDICARE

## 2024-02-05 DIAGNOSIS — Z96.649 S/P HIP HEMIARTHROPLASTY: Primary | ICD-10-CM

## 2024-02-05 PROCEDURE — 97140 MANUAL THERAPY 1/> REGIONS: CPT

## 2024-02-05 PROCEDURE — 97530 THERAPEUTIC ACTIVITIES: CPT

## 2024-02-05 NOTE — PROGRESS NOTES
Physical Therapy Daily Treatment Note    Date:2024   Patient Name: Jaron Alvarado         \" Call him Emma \"   : 1950   MRN: [id number]  DOInjury: 2023   DOSx: 2023    Referring Provider:   [place] [address]       Medical Diagnosis:   Z96.649 (ICD-10-CM) - S/P Left  hip hemiarthroplasty   Z78.9 (ICD-10-CM) - Activity of daily living alteration     Jaron, \"EMMA\", fractured his hip 2023 and underwent hemiarthroplasty. He has severe Parkinson's disease which is the reason he fell and the biggest barrier to Emma's progress. We will work on stretching knee and hip extension as well as standing exercises to improve his gait and balance.    X = TO BE PERFORMED NEXT VISIT  > = PROGRESS TO THIS    S: Patient's wife  AND  pt report doing good today , no new changes since last visit  .O: Discussed anatomy, physiology, body mechanics, principles of loading, and progressive loading/activity.   Time 4094-5000     Visit  visits  Repeat outcome measure at mid point and end.    Pain Pain at rest 0/10     ROM      Modalities         MO   Manual            Stretch      Knee extension stretch seated  X 10 min   MT   Hip extension stretch  seated  MT   Heel prop seated on metal stool from gym      Bilateral hip ABD stretch seated from w/c     Exercise      Ball / can rolling   TE   Toe curls      Heel slides   TE   QS   TE   SLR   TE   [] TG  [] Leg Press 2-leg   TE   [] TG  [] Leg Press 1-leg   TE   Hamstring Curl Machine   TE   Knee Extension Machine   TE   Step-ups - FWD   TA   Step-ups - LAT   TA   Step-ups - BWD   TA   Shoulder flexion seated       Reach and touch to leg press bars       Sit to stands from w/c  2 x 15 in  w/c against leg press for support with min/mod  assist x 1 for safety      LAQ 2 x 15 AAROM      Seated Marching      Seated hip Abduction 1 x 10 at leg press bar      Standing Calf Raises  TE   Standing Marching 2 x 10 using leg press bar

## 2024-02-12 ENCOUNTER — APPOINTMENT (OUTPATIENT)
Dept: CT IMAGING | Age: 74
DRG: 391 | End: 2024-02-12
Payer: MEDICARE

## 2024-02-12 ENCOUNTER — HOSPITAL ENCOUNTER (INPATIENT)
Age: 74
LOS: 6 days | Discharge: HOME HEALTH | DRG: 391 | End: 2024-02-19
Attending: EMERGENCY MEDICINE | Admitting: INTERNAL MEDICINE
Payer: MEDICARE

## 2024-02-12 DIAGNOSIS — R11.2 NAUSEA AND VOMITING, UNSPECIFIED VOMITING TYPE: Primary | ICD-10-CM

## 2024-02-12 DIAGNOSIS — K52.9 ENTERITIS: ICD-10-CM

## 2024-02-12 DIAGNOSIS — D64.9 ACUTE ANEMIA: ICD-10-CM

## 2024-02-12 DIAGNOSIS — K92.2 GASTROINTESTINAL HEMORRHAGE, UNSPECIFIED GASTROINTESTINAL HEMORRHAGE TYPE: ICD-10-CM

## 2024-02-12 LAB
ALBUMIN SERPL-MCNC: 4.5 G/DL (ref 3.5–5.2)
ALP SERPL-CCNC: 72 U/L (ref 40–129)
ALT SERPL-CCNC: 15 U/L (ref 0–40)
ANION GAP SERPL CALCULATED.3IONS-SCNC: 13 MMOL/L (ref 7–16)
AST SERPL-CCNC: 40 U/L (ref 0–39)
BASOPHILS # BLD: 0.1 K/UL (ref 0–0.2)
BASOPHILS NFR BLD: 1 % (ref 0–2)
BILIRUB SERPL-MCNC: 0.6 MG/DL (ref 0–1.2)
BUN SERPL-MCNC: 34 MG/DL (ref 6–23)
CALCIUM SERPL-MCNC: 9.9 MG/DL (ref 8.6–10.2)
CHLORIDE SERPL-SCNC: 101 MMOL/L (ref 98–107)
CO2 SERPL-SCNC: 24 MMOL/L (ref 22–29)
CREAT SERPL-MCNC: 0.9 MG/DL (ref 0.7–1.2)
EOSINOPHIL # BLD: 0 K/UL (ref 0.05–0.5)
EOSINOPHILS RELATIVE PERCENT: 0 % (ref 0–6)
ERYTHROCYTE [DISTWIDTH] IN BLOOD BY AUTOMATED COUNT: 12.2 % (ref 11.5–15)
GFR SERPL CREATININE-BSD FRML MDRD: >60 ML/MIN/1.73M2
GLUCOSE SERPL-MCNC: 176 MG/DL (ref 74–99)
HCT VFR BLD AUTO: 42.8 % (ref 37–54)
HGB BLD-MCNC: 14.3 G/DL (ref 12.5–16.5)
LACTATE BLDV-SCNC: 1.9 MMOL/L (ref 0.5–2.2)
LIPASE SERPL-CCNC: 36 U/L (ref 13–60)
LYMPHOCYTES NFR BLD: 0.31 K/UL (ref 1.5–4)
LYMPHOCYTES RELATIVE PERCENT: 3 % (ref 20–42)
MCH RBC QN AUTO: 29.9 PG (ref 26–35)
MCHC RBC AUTO-ENTMCNC: 33.4 G/DL (ref 32–34.5)
MCV RBC AUTO: 89.5 FL (ref 80–99.9)
MONOCYTES NFR BLD: 0.94 K/UL (ref 0.1–0.95)
MONOCYTES NFR BLD: 8 % (ref 2–12)
NEUTROPHILS NFR BLD: 89 % (ref 43–80)
NEUTS SEG NFR BLD: 10.64 K/UL (ref 1.8–7.3)
PLATELET # BLD AUTO: 360 K/UL (ref 130–450)
PMV BLD AUTO: 10.3 FL (ref 7–12)
POTASSIUM SERPL-SCNC: 4.5 MMOL/L (ref 3.5–5)
PROT SERPL-MCNC: 7.8 G/DL (ref 6.4–8.3)
RBC # BLD AUTO: 4.78 M/UL (ref 3.8–5.8)
RBC # BLD: ABNORMAL 10*6/UL
SODIUM SERPL-SCNC: 138 MMOL/L (ref 132–146)
WBC OTHER # BLD: 12 K/UL (ref 4.5–11.5)

## 2024-02-12 PROCEDURE — 74177 CT ABD & PELVIS W/CONTRAST: CPT

## 2024-02-12 PROCEDURE — 86923 COMPATIBILITY TEST ELECTRIC: CPT

## 2024-02-12 PROCEDURE — 86901 BLOOD TYPING SEROLOGIC RH(D): CPT

## 2024-02-12 PROCEDURE — C9113 INJ PANTOPRAZOLE SODIUM, VIA: HCPCS

## 2024-02-12 PROCEDURE — 99285 EMERGENCY DEPT VISIT HI MDM: CPT

## 2024-02-12 PROCEDURE — 96375 TX/PRO/DX INJ NEW DRUG ADDON: CPT

## 2024-02-12 PROCEDURE — 87449 NOS EACH ORGANISM AG IA: CPT

## 2024-02-12 PROCEDURE — 87899 AGENT NOS ASSAY W/OPTIC: CPT

## 2024-02-12 PROCEDURE — 81001 URINALYSIS AUTO W/SCOPE: CPT

## 2024-02-12 PROCEDURE — 87088 URINE BACTERIA CULTURE: CPT

## 2024-02-12 PROCEDURE — A4216 STERILE WATER/SALINE, 10 ML: HCPCS

## 2024-02-12 PROCEDURE — 83605 ASSAY OF LACTIC ACID: CPT

## 2024-02-12 PROCEDURE — 96374 THER/PROPH/DIAG INJ IV PUSH: CPT

## 2024-02-12 PROCEDURE — 80053 COMPREHEN METABOLIC PANEL: CPT

## 2024-02-12 PROCEDURE — 87086 URINE CULTURE/COLONY COUNT: CPT

## 2024-02-12 PROCEDURE — 83690 ASSAY OF LIPASE: CPT

## 2024-02-12 PROCEDURE — 86900 BLOOD TYPING SEROLOGIC ABO: CPT

## 2024-02-12 PROCEDURE — 6360000004 HC RX CONTRAST MEDICATION: Performed by: RADIOLOGY

## 2024-02-12 PROCEDURE — 6360000002 HC RX W HCPCS

## 2024-02-12 PROCEDURE — 85025 COMPLETE CBC W/AUTO DIFF WBC: CPT

## 2024-02-12 PROCEDURE — 2580000003 HC RX 258

## 2024-02-12 PROCEDURE — 86850 RBC ANTIBODY SCREEN: CPT

## 2024-02-12 RX ORDER — FENTANYL CITRATE 0.05 MG/ML
50 INJECTION, SOLUTION INTRAMUSCULAR; INTRAVENOUS ONCE
Status: COMPLETED | OUTPATIENT
Start: 2024-02-12 | End: 2024-02-12

## 2024-02-12 RX ORDER — ONDANSETRON 2 MG/ML
4 INJECTION INTRAMUSCULAR; INTRAVENOUS ONCE
Status: COMPLETED | OUTPATIENT
Start: 2024-02-12 | End: 2024-02-12

## 2024-02-12 RX ORDER — 0.9 % SODIUM CHLORIDE 0.9 %
1000 INTRAVENOUS SOLUTION INTRAVENOUS ONCE
Status: COMPLETED | OUTPATIENT
Start: 2024-02-12 | End: 2024-02-12

## 2024-02-12 RX ORDER — METOCLOPRAMIDE HYDROCHLORIDE 5 MG/ML
10 INJECTION INTRAMUSCULAR; INTRAVENOUS ONCE
Status: COMPLETED | OUTPATIENT
Start: 2024-02-12 | End: 2024-02-12

## 2024-02-12 RX ADMIN — FENTANYL CITRATE 50 MCG: 0.05 INJECTION, SOLUTION INTRAMUSCULAR; INTRAVENOUS at 23:09

## 2024-02-12 RX ADMIN — METOCLOPRAMIDE 10 MG: 5 INJECTION, SOLUTION INTRAMUSCULAR; INTRAVENOUS at 22:06

## 2024-02-12 RX ADMIN — PANTOPRAZOLE SODIUM 80 MG: 40 INJECTION, POWDER, FOR SOLUTION INTRAVENOUS at 22:16

## 2024-02-12 RX ADMIN — IOPAMIDOL 70 ML: 755 INJECTION, SOLUTION INTRAVENOUS at 22:57

## 2024-02-12 RX ADMIN — ONDANSETRON 4 MG: 2 INJECTION INTRAMUSCULAR; INTRAVENOUS at 20:52

## 2024-02-12 RX ADMIN — SODIUM CHLORIDE 1000 ML: 9 INJECTION, SOLUTION INTRAVENOUS at 20:51

## 2024-02-12 ASSESSMENT — PAIN - FUNCTIONAL ASSESSMENT: PAIN_FUNCTIONAL_ASSESSMENT: 0-10

## 2024-02-12 ASSESSMENT — PAIN DESCRIPTION - LOCATION: LOCATION: ABDOMEN

## 2024-02-13 ENCOUNTER — ANESTHESIA (OUTPATIENT)
Dept: ENDOSCOPY | Age: 74
End: 2024-02-13
Payer: MEDICARE

## 2024-02-13 ENCOUNTER — ANESTHESIA EVENT (OUTPATIENT)
Dept: ENDOSCOPY | Age: 74
End: 2024-02-13
Payer: MEDICARE

## 2024-02-13 ENCOUNTER — APPOINTMENT (OUTPATIENT)
Dept: GENERAL RADIOLOGY | Age: 74
DRG: 391 | End: 2024-02-13
Payer: MEDICARE

## 2024-02-13 PROBLEM — Z51.5 PALLIATIVE CARE ENCOUNTER: Status: ACTIVE | Noted: 2024-02-13

## 2024-02-13 PROBLEM — K52.9 ENTERITIS: Status: ACTIVE | Noted: 2024-02-13

## 2024-02-13 LAB
ALBUMIN SERPL-MCNC: 3.7 G/DL (ref 3.5–5.2)
ALP SERPL-CCNC: 55 U/L (ref 40–129)
ALT SERPL-CCNC: 45 U/L (ref 0–40)
AMMONIA PLAS-SCNC: 35 UMOL/L (ref 16–60)
ANION GAP SERPL CALCULATED.3IONS-SCNC: 10 MMOL/L (ref 7–16)
AST SERPL-CCNC: 53 U/L (ref 0–39)
B PARAP IS1001 DNA NPH QL NAA+NON-PROBE: NOT DETECTED
B PERT DNA SPEC QL NAA+PROBE: NOT DETECTED
BACTERIA URNS QL MICRO: ABNORMAL
BASOPHILS # BLD: 0 K/UL (ref 0–0.2)
BASOPHILS NFR BLD: 0 % (ref 0–2)
BILIRUB SERPL-MCNC: 0.4 MG/DL (ref 0–1.2)
BILIRUB UR QL STRIP: NEGATIVE
BUN SERPL-MCNC: 35 MG/DL (ref 6–23)
C PNEUM DNA NPH QL NAA+NON-PROBE: NOT DETECTED
CALCIUM SERPL-MCNC: 8.5 MG/DL (ref 8.6–10.2)
CHLORIDE SERPL-SCNC: 108 MMOL/L (ref 98–107)
CLARITY UR: ABNORMAL
CO2 SERPL-SCNC: 21 MMOL/L (ref 22–29)
COLOR UR: YELLOW
CREAT SERPL-MCNC: 0.8 MG/DL (ref 0.7–1.2)
EOSINOPHIL # BLD: 0.09 K/UL (ref 0.05–0.5)
EOSINOPHILS RELATIVE PERCENT: 1 % (ref 0–6)
EPI CELLS #/AREA URNS HPF: ABNORMAL /HPF (ref 0–5)
ERYTHROCYTE [DISTWIDTH] IN BLOOD BY AUTOMATED COUNT: 12.2 % (ref 11.5–15)
FERRITIN SERPL-MCNC: 405 NG/ML
FLUAV RNA NPH QL NAA+NON-PROBE: NOT DETECTED
FLUBV RNA NPH QL NAA+NON-PROBE: NOT DETECTED
FOLATE SERPL-MCNC: 8.4 NG/ML (ref 4.8–24.2)
GFR SERPL CREATININE-BSD FRML MDRD: >60 ML/MIN/1.73M2
GLUCOSE SERPL-MCNC: 119 MG/DL (ref 74–99)
GLUCOSE UR STRIP-MCNC: NEGATIVE MG/DL
HADV DNA NPH QL NAA+NON-PROBE: NOT DETECTED
HCOV 229E RNA NPH QL NAA+NON-PROBE: NOT DETECTED
HCOV HKU1 RNA NPH QL NAA+NON-PROBE: NOT DETECTED
HCOV NL63 RNA NPH QL NAA+NON-PROBE: NOT DETECTED
HCOV OC43 RNA NPH QL NAA+NON-PROBE: NOT DETECTED
HCT VFR BLD AUTO: 35.6 % (ref 37–54)
HCT VFR BLD AUTO: 37.7 % (ref 37–54)
HCT VFR BLD AUTO: 39.6 % (ref 37–54)
HGB BLD-MCNC: 12.1 G/DL (ref 12.5–16.5)
HGB BLD-MCNC: 12.7 G/DL (ref 12.5–16.5)
HGB BLD-MCNC: 13 G/DL (ref 12.5–16.5)
HGB UR QL STRIP.AUTO: ABNORMAL
HMPV RNA NPH QL NAA+NON-PROBE: NOT DETECTED
HPIV1 RNA NPH QL NAA+NON-PROBE: NOT DETECTED
HPIV2 RNA NPH QL NAA+NON-PROBE: NOT DETECTED
HPIV3 RNA NPH QL NAA+NON-PROBE: NOT DETECTED
HPIV4 RNA NPH QL NAA+NON-PROBE: NOT DETECTED
INR PPP: 1.2
IRON SATN MFR SERPL: 37 % (ref 20–55)
IRON SERPL-MCNC: 71 UG/DL (ref 59–158)
KETONES UR STRIP-MCNC: NEGATIVE MG/DL
LEUKOCYTE ESTERASE UR QL STRIP: ABNORMAL
LYMPHOCYTES NFR BLD: 0.17 K/UL (ref 1.5–4)
LYMPHOCYTES RELATIVE PERCENT: 2 % (ref 20–42)
M PNEUMO DNA NPH QL NAA+NON-PROBE: NOT DETECTED
MAGNESIUM SERPL-MCNC: 1.7 MG/DL (ref 1.6–2.6)
MCH RBC QN AUTO: 30 PG (ref 26–35)
MCHC RBC AUTO-ENTMCNC: 34 G/DL (ref 32–34.5)
MCV RBC AUTO: 88.3 FL (ref 80–99.9)
MONOCYTES NFR BLD: 1.03 K/UL (ref 0.1–0.95)
MONOCYTES NFR BLD: 10 % (ref 2–12)
NEUTROPHILS NFR BLD: 87 % (ref 43–80)
NEUTS SEG NFR BLD: 8.61 K/UL (ref 1.8–7.3)
NITRITE UR QL STRIP: POSITIVE
PH UR STRIP: 6 [PH] (ref 5–9)
PHOSPHATE SERPL-MCNC: 2.7 MG/DL (ref 2.5–4.5)
PLATELET # BLD AUTO: 283 K/UL (ref 130–450)
PMV BLD AUTO: 9.9 FL (ref 7–12)
POTASSIUM SERPL-SCNC: 4.3 MMOL/L (ref 3.5–5)
PROCALCITONIN SERPL-MCNC: 0.06 NG/ML (ref 0–0.08)
PROT SERPL-MCNC: 6.2 G/DL (ref 6.4–8.3)
PROT UR STRIP-MCNC: NEGATIVE MG/DL
PROTHROMBIN TIME: 13.8 SEC (ref 9.3–12.4)
RBC # BLD AUTO: 4.03 M/UL (ref 3.8–5.8)
RBC # BLD: NORMAL 10*6/UL
RBC #/AREA URNS HPF: ABNORMAL /HPF (ref 0–2)
RSV RNA NPH QL NAA+NON-PROBE: NOT DETECTED
RV+EV RNA NPH QL NAA+NON-PROBE: NOT DETECTED
SARS-COV-2 RNA NPH QL NAA+NON-PROBE: NOT DETECTED
SODIUM SERPL-SCNC: 139 MMOL/L (ref 132–146)
SP GR UR STRIP: 1.01 (ref 1–1.03)
SPECIMEN DESCRIPTION: NORMAL
T4 FREE SERPL-MCNC: 1.1 NG/DL (ref 0.9–1.7)
TIBC SERPL-MCNC: 194 UG/DL (ref 250–450)
TSH SERPL DL<=0.05 MIU/L-ACNC: 1.26 UIU/ML (ref 0.27–4.2)
UROBILINOGEN UR STRIP-ACNC: 0.2 EU/DL (ref 0–1)
VIT B12 SERPL-MCNC: 717 PG/ML (ref 211–946)
WBC #/AREA URNS HPF: ABNORMAL /HPF (ref 0–5)
WBC OTHER # BLD: 9.9 K/UL (ref 4.5–11.5)

## 2024-02-13 PROCEDURE — 0202U NFCT DS 22 TRGT SARS-COV-2: CPT

## 2024-02-13 PROCEDURE — 85025 COMPLETE CBC W/AUTO DIFF WBC: CPT

## 2024-02-13 PROCEDURE — 80053 COMPREHEN METABOLIC PANEL: CPT

## 2024-02-13 PROCEDURE — 3609012400 HC EGD TRANSORAL BIOPSY SINGLE/MULTIPLE: Performed by: INTERNAL MEDICINE

## 2024-02-13 PROCEDURE — 85610 PROTHROMBIN TIME: CPT

## 2024-02-13 PROCEDURE — 2580000003 HC RX 258: Performed by: INTERNAL MEDICINE

## 2024-02-13 PROCEDURE — 84439 ASSAY OF FREE THYROXINE: CPT

## 2024-02-13 PROCEDURE — 87040 BLOOD CULTURE FOR BACTERIA: CPT

## 2024-02-13 PROCEDURE — 36415 COLL VENOUS BLD VENIPUNCTURE: CPT

## 2024-02-13 PROCEDURE — 85018 HEMOGLOBIN: CPT

## 2024-02-13 PROCEDURE — 6360000002 HC RX W HCPCS: Performed by: NURSE ANESTHETIST, CERTIFIED REGISTERED

## 2024-02-13 PROCEDURE — 6360000002 HC RX W HCPCS: Performed by: NURSE PRACTITIONER

## 2024-02-13 PROCEDURE — 82140 ASSAY OF AMMONIA: CPT

## 2024-02-13 PROCEDURE — 2500000003 HC RX 250 WO HCPCS: Performed by: NURSE PRACTITIONER

## 2024-02-13 PROCEDURE — 85014 HEMATOCRIT: CPT

## 2024-02-13 PROCEDURE — 6360000002 HC RX W HCPCS: Performed by: INTERNAL MEDICINE

## 2024-02-13 PROCEDURE — 51702 INSERT TEMP BLADDER CATH: CPT

## 2024-02-13 PROCEDURE — 83735 ASSAY OF MAGNESIUM: CPT

## 2024-02-13 PROCEDURE — 3700000001 HC ADD 15 MINUTES (ANESTHESIA): Performed by: INTERNAL MEDICINE

## 2024-02-13 PROCEDURE — 2060000000 HC ICU INTERMEDIATE R&B

## 2024-02-13 PROCEDURE — 3700000000 HC ANESTHESIA ATTENDED CARE: Performed by: INTERNAL MEDICINE

## 2024-02-13 PROCEDURE — 84145 PROCALCITONIN (PCT): CPT

## 2024-02-13 PROCEDURE — C9113 INJ PANTOPRAZOLE SODIUM, VIA: HCPCS | Performed by: INTERNAL MEDICINE

## 2024-02-13 PROCEDURE — 2709999900 HC NON-CHARGEABLE SUPPLY: Performed by: INTERNAL MEDICINE

## 2024-02-13 PROCEDURE — 88312 SPECIAL STAINS GROUP 1: CPT

## 2024-02-13 PROCEDURE — 99222 1ST HOSP IP/OBS MODERATE 55: CPT | Performed by: NURSE PRACTITIONER

## 2024-02-13 PROCEDURE — 84443 ASSAY THYROID STIM HORMONE: CPT

## 2024-02-13 PROCEDURE — 2580000003 HC RX 258: Performed by: NURSE PRACTITIONER

## 2024-02-13 PROCEDURE — 97530 THERAPEUTIC ACTIVITIES: CPT | Performed by: PHYSICAL THERAPIST

## 2024-02-13 PROCEDURE — 82746 ASSAY OF FOLIC ACID SERUM: CPT

## 2024-02-13 PROCEDURE — 83540 ASSAY OF IRON: CPT

## 2024-02-13 PROCEDURE — 84100 ASSAY OF PHOSPHORUS: CPT

## 2024-02-13 PROCEDURE — 0DB58ZX EXCISION OF ESOPHAGUS, VIA NATURAL OR ARTIFICIAL OPENING ENDOSCOPIC, DIAGNOSTIC: ICD-10-PCS | Performed by: INTERNAL MEDICINE

## 2024-02-13 PROCEDURE — 74018 RADEX ABDOMEN 1 VIEW: CPT

## 2024-02-13 PROCEDURE — 7100000010 HC PHASE II RECOVERY - FIRST 15 MIN: Performed by: INTERNAL MEDICINE

## 2024-02-13 PROCEDURE — 97161 PT EVAL LOW COMPLEX 20 MIN: CPT | Performed by: PHYSICAL THERAPIST

## 2024-02-13 PROCEDURE — 2580000003 HC RX 258: Performed by: NURSE ANESTHETIST, CERTIFIED REGISTERED

## 2024-02-13 PROCEDURE — 99222 1ST HOSP IP/OBS MODERATE 55: CPT | Performed by: SURGERY

## 2024-02-13 PROCEDURE — 6370000000 HC RX 637 (ALT 250 FOR IP)

## 2024-02-13 PROCEDURE — 82728 ASSAY OF FERRITIN: CPT

## 2024-02-13 PROCEDURE — 7100000011 HC PHASE II RECOVERY - ADDTL 15 MIN: Performed by: INTERNAL MEDICINE

## 2024-02-13 PROCEDURE — 83550 IRON BINDING TEST: CPT

## 2024-02-13 PROCEDURE — 82607 VITAMIN B-12: CPT

## 2024-02-13 PROCEDURE — 2500000003 HC RX 250 WO HCPCS: Performed by: NURSE ANESTHETIST, CERTIFIED REGISTERED

## 2024-02-13 PROCEDURE — 88305 TISSUE EXAM BY PATHOLOGIST: CPT

## 2024-02-13 PROCEDURE — 71045 X-RAY EXAM CHEST 1 VIEW: CPT

## 2024-02-13 RX ORDER — POLYETHYLENE GLYCOL 3350 17 G/17G
17 POWDER, FOR SOLUTION ORAL DAILY PRN
Status: DISCONTINUED | OUTPATIENT
Start: 2024-02-13 | End: 2024-02-19 | Stop reason: HOSPADM

## 2024-02-13 RX ORDER — LIDOCAINE HYDROCHLORIDE 20 MG/ML
INJECTION, SOLUTION INFILTRATION; PERINEURAL PRN
Status: DISCONTINUED | OUTPATIENT
Start: 2024-02-13 | End: 2024-02-13 | Stop reason: SDUPTHER

## 2024-02-13 RX ORDER — NITROFURANTOIN MACROCRYSTALS 100 MG/1
100 CAPSULE ORAL DAILY
Status: DISCONTINUED | OUTPATIENT
Start: 2024-02-13 | End: 2024-02-19 | Stop reason: HOSPADM

## 2024-02-13 RX ORDER — SODIUM CHLORIDE 9 MG/ML
INJECTION, SOLUTION INTRAVENOUS CONTINUOUS PRN
Status: DISCONTINUED | OUTPATIENT
Start: 2024-02-13 | End: 2024-02-13 | Stop reason: SDUPTHER

## 2024-02-13 RX ORDER — ACETAMINOPHEN 650 MG/1
650 SUPPOSITORY RECTAL EVERY 6 HOURS PRN
Status: DISCONTINUED | OUTPATIENT
Start: 2024-02-13 | End: 2024-02-19 | Stop reason: HOSPADM

## 2024-02-13 RX ORDER — PROPOFOL 10 MG/ML
INJECTION, EMULSION INTRAVENOUS PRN
Status: DISCONTINUED | OUTPATIENT
Start: 2024-02-13 | End: 2024-02-13 | Stop reason: SDUPTHER

## 2024-02-13 RX ORDER — 0.9 % SODIUM CHLORIDE 0.9 %
1000 INTRAVENOUS SOLUTION INTRAVENOUS ONCE
Status: COMPLETED | OUTPATIENT
Start: 2024-02-13 | End: 2024-02-13

## 2024-02-13 RX ORDER — DEXTROSE MONOHYDRATE 100 MG/ML
INJECTION, SOLUTION INTRAVENOUS CONTINUOUS PRN
Status: DISCONTINUED | OUTPATIENT
Start: 2024-02-13 | End: 2024-02-13

## 2024-02-13 RX ORDER — MAGNESIUM SULFATE IN WATER 40 MG/ML
2000 INJECTION, SOLUTION INTRAVENOUS PRN
Status: DISCONTINUED | OUTPATIENT
Start: 2024-02-13 | End: 2024-02-19 | Stop reason: HOSPADM

## 2024-02-13 RX ORDER — POTASSIUM CHLORIDE 20 MEQ/1
40 TABLET, EXTENDED RELEASE ORAL PRN
Status: DISCONTINUED | OUTPATIENT
Start: 2024-02-13 | End: 2024-02-19 | Stop reason: HOSPADM

## 2024-02-13 RX ORDER — THIAMINE HYDROCHLORIDE 100 MG/ML
100 INJECTION, SOLUTION INTRAMUSCULAR; INTRAVENOUS DAILY
Status: DISCONTINUED | OUTPATIENT
Start: 2024-02-13 | End: 2024-02-19 | Stop reason: HOSPADM

## 2024-02-13 RX ORDER — SODIUM CHLORIDE 9 MG/ML
INJECTION, SOLUTION INTRAVENOUS PRN
Status: DISCONTINUED | OUTPATIENT
Start: 2024-02-13 | End: 2024-02-19 | Stop reason: HOSPADM

## 2024-02-13 RX ORDER — SODIUM CHLORIDE 0.9 % (FLUSH) 0.9 %
5-40 SYRINGE (ML) INJECTION EVERY 12 HOURS SCHEDULED
Status: DISCONTINUED | OUTPATIENT
Start: 2024-02-13 | End: 2024-02-19 | Stop reason: HOSPADM

## 2024-02-13 RX ORDER — BISACODYL 10 MG
10 SUPPOSITORY, RECTAL RECTAL ONCE
Status: COMPLETED | OUTPATIENT
Start: 2024-02-13 | End: 2024-02-13

## 2024-02-13 RX ORDER — POTASSIUM CHLORIDE 7.45 MG/ML
10 INJECTION INTRAVENOUS PRN
Status: DISCONTINUED | OUTPATIENT
Start: 2024-02-13 | End: 2024-02-19 | Stop reason: HOSPADM

## 2024-02-13 RX ORDER — ACETAMINOPHEN 325 MG/1
650 TABLET ORAL EVERY 6 HOURS PRN
Status: DISCONTINUED | OUTPATIENT
Start: 2024-02-13 | End: 2024-02-19 | Stop reason: HOSPADM

## 2024-02-13 RX ORDER — GLUCAGON 1 MG/ML
1 KIT INJECTION PRN
Status: DISCONTINUED | OUTPATIENT
Start: 2024-02-13 | End: 2024-02-13

## 2024-02-13 RX ORDER — METRONIDAZOLE 500 MG/100ML
500 INJECTION, SOLUTION INTRAVENOUS EVERY 8 HOURS
Status: DISCONTINUED | OUTPATIENT
Start: 2024-02-13 | End: 2024-02-19

## 2024-02-13 RX ORDER — DEXTROSE, SODIUM CHLORIDE, SODIUM LACTATE, POTASSIUM CHLORIDE, AND CALCIUM CHLORIDE 5; .6; .31; .03; .02 G/100ML; G/100ML; G/100ML; G/100ML; G/100ML
INJECTION, SOLUTION INTRAVENOUS CONTINUOUS
Status: DISCONTINUED | OUTPATIENT
Start: 2024-02-13 | End: 2024-02-17

## 2024-02-13 RX ORDER — SODIUM CHLORIDE 0.9 % (FLUSH) 0.9 %
5-40 SYRINGE (ML) INJECTION PRN
Status: DISCONTINUED | OUTPATIENT
Start: 2024-02-13 | End: 2024-02-19 | Stop reason: HOSPADM

## 2024-02-13 RX ADMIN — PROPOFOL 120 MG: 10 INJECTION, EMULSION INTRAVENOUS at 15:23

## 2024-02-13 RX ADMIN — METRONIDAZOLE 500 MG: 500 INJECTION, SOLUTION INTRAVENOUS at 10:38

## 2024-02-13 RX ADMIN — PANTOPRAZOLE SODIUM 8 MG/HR: 40 INJECTION, POWDER, FOR SOLUTION INTRAVENOUS at 15:56

## 2024-02-13 RX ADMIN — LIDOCAINE HYDROCHLORIDE 40 MG: 20 INJECTION, SOLUTION INFILTRATION; PERINEURAL at 15:23

## 2024-02-13 RX ADMIN — PHENYLEPHRINE HYDROCHLORIDE 100 MCG: 10 INJECTION INTRAVENOUS at 15:32

## 2024-02-13 RX ADMIN — BISACODYL 10 MG: 10 SUPPOSITORY RECTAL at 14:06

## 2024-02-13 RX ADMIN — SODIUM CHLORIDE: 900 INJECTION, SOLUTION INTRAVENOUS at 15:17

## 2024-02-13 RX ADMIN — DOXYCYCLINE 100 MG: 100 INJECTION, POWDER, LYOPHILIZED, FOR SOLUTION INTRAVENOUS at 14:05

## 2024-02-13 RX ADMIN — THIAMINE HYDROCHLORIDE 100 MG: 100 INJECTION, SOLUTION INTRAMUSCULAR; INTRAVENOUS at 10:39

## 2024-02-13 RX ADMIN — DOXYCYCLINE 100 MG: 100 INJECTION, POWDER, LYOPHILIZED, FOR SOLUTION INTRAVENOUS at 21:45

## 2024-02-13 RX ADMIN — PANTOPRAZOLE SODIUM 8 MG/HR: 40 INJECTION, POWDER, FOR SOLUTION INTRAVENOUS at 05:04

## 2024-02-13 RX ADMIN — SODIUM CHLORIDE, SODIUM LACTATE, POTASSIUM CHLORIDE, CALCIUM CHLORIDE AND DEXTROSE MONOHYDRATE: 5; 600; 310; 30; 20 INJECTION, SOLUTION INTRAVENOUS at 06:40

## 2024-02-13 RX ADMIN — SODIUM CHLORIDE 1000 ML: 9 INJECTION, SOLUTION INTRAVENOUS at 05:03

## 2024-02-13 RX ADMIN — METRONIDAZOLE 500 MG: 500 INJECTION, SOLUTION INTRAVENOUS at 23:03

## 2024-02-13 RX ADMIN — SODIUM CHLORIDE, PRESERVATIVE FREE 10 ML: 5 INJECTION INTRAVENOUS at 21:45

## 2024-02-13 RX ADMIN — WATER 1000 MG: 1 INJECTION INTRAMUSCULAR; INTRAVENOUS; SUBCUTANEOUS at 10:39

## 2024-02-13 RX ADMIN — METRONIDAZOLE 500 MG: 500 INJECTION, SOLUTION INTRAVENOUS at 18:32

## 2024-02-13 RX ADMIN — SODIUM CHLORIDE, PRESERVATIVE FREE 10 ML: 5 INJECTION INTRAVENOUS at 10:40

## 2024-02-13 NOTE — ANESTHESIA POSTPROCEDURE EVALUATION
Department of Anesthesiology  Postprocedure Note    Patient: Jaron Alvarado  MRN: [id number]  YOB: 1950  Date of evaluation: 2/13/2024    Procedure Summary       Date: 02/13/24 Room / Location: [place] [address] / [facility]    Anesthesia Start: 1517 Anesthesia Stop: 1545    Procedure: EGD BIOPSY Diagnosis:       Acute anemia      (Acute anemia [D64.9])    Surgeons: Hank Brennan MD Responsible Provider: Kai Kwon MD    Anesthesia Type: MAC ASA Status: 3            Anesthesia Type: No value filed.    Malcolm Phase I:      Malcolm Phase II: Malcolm Score: 9    Anesthesia Post Evaluation    Patient location during evaluation: PACU  Patient participation: complete - patient participated  Level of consciousness: awake  Airway patency: patent  Nausea & Vomiting: no nausea and no vomiting  Cardiovascular status: hemodynamically stable  Respiratory status: acceptable  Hydration status: euvolemic  Pain management: adequate        No notable events documented.

## 2024-02-13 NOTE — H&P
Department of Internal Medicine  History and Physical    PCP: Steve Ga DO  Admitting Physician: Dr. Vazquez/Pedro  Consultants:   Date of Service: 2/12/2024    CHIEF COMPLAINT:  abdominal pain    HISTORY OF PRESENT ILLNESS:    Patient is 73-year-old male who presented to the ED due to abdominal pain and persistent nausea and emesis.  Symptoms started last night while he was eating dinner.  He complained of abdominal pain and stopped eating dinner.  However today throughout the day he has been having persistent nausea and emesis as well as loose stools.  Wife states noted that it was darker in color.  They noted that he is vomit was also coffee-ground in color.  He does have abdominal distention.  He denies any previous scope to his stomach.  Denies any use of NSAIDs or aspirin ibuprofen.    PAST MEDICAL Hx:  Past Medical History:   Diagnosis Date    Parkinson's disease        PAST SURGICAL Hx:   Past Surgical History:   Procedure Laterality Date    ARM SURGERY Left 3/23/2023    LEFT HIP  IRRIGATION AND DEBRIDEMENT WITH POSSIBLE EXCHANGE OF FEMORAL HEAD COMPONENT performed by Semaj Dorantes DO at [facility] OR    DEEP BRAIN STIMULATOR PLACEMENT      L chest    FRACTURE SURGERY      nose    HIP SURGERY Left 2/28/2023    LEFT HIP HEMIARTHROPLASTY (DEPUY) performed by PAULETTE Sy DO at [facility] OR    JOINT REPLACEMENT Right 05/16/2017    knee    KNEE ARTHROSCOPY Right     TONSILLECTOMY      TOTAL KNEE ARTHROPLASTY Left 9/1/2020    TOTAL LEFT KNEE REPLACEMENT/ ARTHROPLASTY (JOLENE) performed by PAULETTE Sy DO at [facility] OR       FAMILY Hx:  Family History   Problem Relation Age of Onset    Alzheimer's Disease Mother     Cancer Father        HOME MEDICATIONS:  Prior to Admission medications    Medication Sig Start Date End Date Taking? Authorizing Provider   nitrofurantoin (MACRODANTIN) 100 MG capsule Take 1 capsule by mouth once daily 1/29/24   Steve Ga DO   carbidopa-levodopa (SINEMET)  MG

## 2024-02-13 NOTE — ED PROVIDER NOTES
[facility] EMERGENCY DEPARTMENT  EMERGENCY DEPARTMENT ENCOUNTER        Pt Name: Jaron Alvarado  MRN: [id number]  Birthdate 1950  Date of evaluation: 2/12/2024  Provider: Hilda Collins MD  PCP: Steve Ga DO  Note Started: 2:11 AM EST 2/13/24    CHIEF COMPLAINT       Chief Complaint   Patient presents with    Abdominal Pain     Pain and bloating x 2 days/ vomiting and diarrhea cont    Hematemesis     Coffe ground per hx wife       HISTORY OF PRESENT ILLNESS: 1 or more Elements     Jaron Alvarado is a 73 y.o. male past medical history of Parkinson's who presents abdominal pain, nausea and vomiting.  History obtained from wife who states that over the past 2 days patient has had vomiting and diarrhea, she noticed today his vomit was coffee-ground and dark.  He is unable to keep anything down.  Review of system limited due to patient's Parkinson's vomiting.  Nursing Notes were all reviewed and agreed with or any disagreements were addressed in the HPI.    ROS:   Pertinent positives and negatives are stated within HPI, all other systems reviewed and are negative.      --------------------------------------------- PAST HISTORY ---------------------------------------------  Past Medical History:  has a past medical history of Parkinson's disease.    Past Surgical History:  has a past surgical history that includes Deep brain stimulator placement; Knee arthroscopy (Right); Tonsillectomy; fracture surgery; joint replacement (Right, 05/16/2017); Total knee arthroplasty (Left, 9/1/2020); hip surgery (Left, 2/28/2023); and Arm Surgery (Left, 3/23/2023).    Social History:  reports that he has never smoked. He quit smokeless tobacco use about 19 years ago. He reports that he does not drink alcohol and does not use drugs.    Family History: family history includes Alzheimer's Disease in his mother; Cancer in his father.     The patient’s home medications have been

## 2024-02-13 NOTE — ANESTHESIA PRE PROCEDURE
Value Date/Time    WBC 9.9 02/13/2024 06:25 AM    RBC 4.03 02/13/2024 06:25 AM    HGB 13.0 02/13/2024 10:21 AM    HCT 39.6 02/13/2024 10:21 AM    MCV 88.3 02/13/2024 06:25 AM    RDW 12.2 02/13/2024 06:25 AM     02/13/2024 06:25 AM       CMP:   Lab Results   Component Value Date/Time     02/13/2024 06:25 AM    K 4.3 02/13/2024 06:25 AM    K 4.1 03/24/2023 04:24 AM     02/13/2024 06:25 AM    CO2 21 02/13/2024 06:25 AM    BUN 35 02/13/2024 06:25 AM    CREATININE 0.8 02/13/2024 06:25 AM    GFRAA >60 08/31/2020 09:20 AM    LABGLOM >60 02/13/2024 06:25 AM    GLUCOSE 119 02/13/2024 06:25 AM    PROT 6.2 02/13/2024 06:25 AM    CALCIUM 8.5 02/13/2024 06:25 AM    BILITOT 0.4 02/13/2024 06:25 AM    ALKPHOS 55 02/13/2024 06:25 AM    AST 53 02/13/2024 06:25 AM    ALT 45 02/13/2024 06:25 AM       POC Tests: No results for input(s): \"POCGLU\", \"POCNA\", \"POCK\", \"POCCL\", \"POCBUN\", \"POCHEMO\", \"POCHCT\" in the last 72 hours.    Coags:   Lab Results   Component Value Date/Time    PROTIME 13.8 02/13/2024 03:25 AM    INR 1.2 02/13/2024 03:25 AM    APTT 32.2 03/22/2023 09:45 PM       HCG (If Applicable): No results found for: \"PREGTESTUR\", \"PREGSERUM\", \"HCG\", \"HCGQUANT\"     ABGs: No results found for: \"PHART\", \"PO2ART\", \"DKI7IEY\", \"XZN5WNS\", \"BEART\", \"U9SLISXP\"     Type & Screen (If Applicable):  No results found for: \"LABABO\", \"LABRH\"    Drug/Infectious Status (If Applicable):  No results found for: \"HIV\", \"HEPCAB\"    COVID-19 Screening (If Applicable):   Lab Results   Component Value Date/Time    COVID19 Not Detected 03/28/2023 04:10 PM    COVID19 NOT DETECTED 03/02/2023 12:45 PM           Anesthesia Evaluation  Patient summary reviewed   no history of anesthetic complications:   Airway: Mallampati: II  TM distance: >3 FB   Neck ROM: full  Mouth opening: > = 3 FB   Dental: normal exam         Pulmonary:Negative Pulmonary ROS breath sounds clear to auscultation                             Cardiovascular:    (+) pulmonary

## 2024-02-13 NOTE — CARE COORDINATION
2/13/24 1208 Hospice consult noted. Palliative care noted reviewed and family is interested in speaking the HOTV for information only  and the referral was called. Electronically signed by Becki Flores RN on 2/13/2024 at 12:10 PM

## 2024-02-13 NOTE — OP NOTE
Operative Note      Patient: Jaron Alvarado  YOB: 1950  MRN: [id number]    Date of Procedure: 2/13/2024    Pre-Op Diagnosis Codes:     * Acute anemia [D64.9]    Post-Op Diagnosis:    Necrotic esophagitis.       Procedure(s):  EGD ESOPHAGOGASTRODUODENOSCOPY    Surgeon(s):  Hank Brennan MD    Assistant:   * No surgical staff found *    Anesthesia: Monitor Anesthesia Care    Estimated Blood Loss (mL): None    Complications: None    Specimens:   * No specimens in log *    Implants:  * No implants in log *      Drains: * No LDAs found *    Findings: Necrotic esophagitis, usually seen in debilitated patients.        Detailed Description of Procedure:   73-year-old male patient, with recent anemia.  Has significant comorbid conditions.    The upper endoscope was introduced through the mouth.  The esophagus, stomach and proximal duodenum were inspected.  Exam of the gastric fundus by retroflexion.  The stomach distends well with air insufflation.  Necrotic mucosa in the lower two third of the esophagus, biopsies.  Coffee-ground liquid in the gastric fundus, suctioned.  Exam of the upper esophagus and hypopharynx during withdrawal.    Electronically signed by Hank Brennan MD on 2/13/2024 at 2:44 PM

## 2024-02-13 NOTE — CONSULTS
General Surgery   Consult Note      Patient's Name/Date of Birth: Jaron Alvarado / 1950    Date: February 13, 2024     PCP: Steve Ga DO     Chief Complaint: abd pain    HPI:   Jaron Alvarado is a 73 y.o. male who presents for evaluation of abdominal pain. Pt is a poor historian and unable to communicate why he is in the hospital or what type of symptoms he has been having, and there is no family member present at bedside. HPI obtained from chart review.  Patient reportedly had acute onset abdominal pain with nausea and emesis on the evening of 2/11. Emesis reportedly had a brownish tinge to it.     Hx notable for parkinson's w deep brain stimulator      LA 1.9, Hgb 12.1,     Patient Active Problem List   Diagnosis    S/P knee replacement    Parkinson disease    Arthritis of left knee    Intertrochanteric fracture of left femur, closed, initial encounter ([facility])    Rupture of operation wound    S/P hip hemiarthroplasty    Parkinson's disease    Enteritis       Allergies   Allergen Reactions    Seasonal        Past Medical History:   Diagnosis Date    Parkinson's disease        Past Surgical History:   Procedure Laterality Date    ARM SURGERY Left 3/23/2023    LEFT HIP  IRRIGATION AND DEBRIDEMENT WITH POSSIBLE EXCHANGE OF FEMORAL HEAD COMPONENT performed by Semaj Dorantes DO at [facility]    DEEP BRAIN STIMULATOR PLACEMENT      L chest    FRACTURE SURGERY      nose    HIP SURGERY Left 2/28/2023    LEFT HIP HEMIARTHROPLASTY (DEPUY) performed by PAULETTE Sy DO at [facility] OR    JOINT REPLACEMENT Right 05/16/2017    knee    KNEE ARTHROSCOPY Right     TONSILLECTOMY      TOTAL KNEE ARTHROPLASTY Left 9/1/2020    TOTAL LEFT KNEE REPLACEMENT/ ARTHROPLASTY (JOLENE) performed by PAULETTE Sy DO at [facility] OR       Social History     Socioeconomic History    Marital status:      Spouse name: Not on file    Number of children: Not on file    Years of education: Not on file    Highest education 
Conversation:    Chart reviewed.  Patient seen at the bedside, having NG and Brown catheter inserted.  Patient has a history of Parkinson's disease with dementia.  Met with patient's spouse, Emily, outside of the patient's room.  Introduced self and the role of palliative medicine.  Discussed patient's current condition and comorbidities.  Discussed goals of care and CODE STATUS options.  Emily states that in the event of cardiopulmonary arrest, she would not want patient to receive chest compressions or intubation.  She is in agreement with limited code no chest compressions or intubation.  Reviewed living will.  Emily states that the patient was diagnosed with Parkinson's disease when he was 53 and he is slowly had declined in his health.  She takes care of him in the home setting and states that occasionally he will have behavioral issues.  She understands that his disease process is terminal and would like to speak with hospice for more information.  Introduced hospice philosophy.  She would like to speak with hospice [facility], consult called.  Emotional support provided.  All questions and concerns addressed.  Palliative medicine will continue to follow.      OBJECTIVE:   Prognosis: Poor    ROS: UNLESS STATED ABOVE PATIENT DENIES:  CONSTITUTIONAL:  fever, chills, fatigue.  HEENT: hearing problem, tinnitus, hoarseness, dysphagia  RESPIRATORY: cough, shortness of breath, sputum expectoration.  CARDIOVASCULAR:  Chest pain/pressure, palpitation, syncope, irregular beats  GASTROINTESTINAL:  abdominal or rectal pain, diarrhea, constipation  GENITOURINARY:  Burning, frequency, urgency, incontinence  INTEGUMENTARY: rash, wound, pruritis  HEMATOLOGIC/LYMPHATIC:  Swelling, sores, gum bleeding, easy bruising  MUSCULOSKELETAL:  pain, edema, joint swelling or redness  NEUROLOGICAL:  light headed, dizziness, loss of consciousness, seizures, tremors    Physical Exam:  /82   Pulse 89   Temp 98 °F (36.7 °C)   
CREATININE 0.8 02/13/2024 06:25 AM    CALCIUM 8.5 02/13/2024 06:25 AM    PROT 6.2 02/13/2024 06:25 AM    LABALBU 3.7 02/13/2024 06:25 AM    BILITOT 0.4 02/13/2024 06:25 AM    ALKPHOS 55 02/13/2024 06:25 AM    AST 53 02/13/2024 06:25 AM    ALT 45 02/13/2024 06:25 AM     Lab Results   Component Value Date/Time    LIPASE 36 02/12/2024 08:47 PM     No results found for: \"AMYLASE\"      ASSESSMENT/PLAN:  Patient Active Problem List   Diagnosis    S/P knee replacement    Parkinson disease    Arthritis of left knee    Intertrochanteric fracture of left femur, closed, initial encounter ([facility])    Rupture of operation wound    S/P hip hemiarthroplasty    Parkinson's disease    Enteritis     1.  GI bleed/anemia  -Abnormal CT scan  -Possible Denise-Kwon however broad differential  -High-dose IV PPI  -Elevate head of bed  -Oxygen nasal cannula  -Monitor H&H every 6 hours  -Keep on hold 2 units PRBC  -Defer transfusion per admitting  -Further evaluation/treatment with upper endoscopy  -Plan for inpatient versus outpatient colonoscopy depending on clinical course/H&H dynamics    Above has been discussed with patient wife over the phone and all questions answered to her satisfaction.  She verbalized understanding and agreement with the plan as delineated including plan for above described EGD.  Please, see orders for plan of care    Thank you for the opportunity to see this patient in consultation    Lit Tamayo MD  2/13/2024  7:49 AM    NOTE:  This report was transcribed using voice recognition software.  Every effort was made to ensure accuracy; however, inadvertent computerized transcription errors may be present.    Agree with above.  PT currently poor historian.  Further hx per wife above.  Admitting and Gen Surg notes reviewed.  Pt with ileus on CT and agree with NG to LIS if patient will allow.  Plan for EGD to eval abnormal CT esophagus and question of coffee ground emesis.  Recommend MBS as well prior to D/C to R/O

## 2024-02-14 ENCOUNTER — APPOINTMENT (OUTPATIENT)
Dept: GENERAL RADIOLOGY | Age: 74
DRG: 391 | End: 2024-02-14
Payer: MEDICARE

## 2024-02-14 PROBLEM — E43 SEVERE PROTEIN-CALORIE MALNUTRITION (HCC): Chronic | Status: ACTIVE | Noted: 2024-02-14

## 2024-02-14 LAB
ALBUMIN SERPL-MCNC: 3.2 G/DL (ref 3.5–5.2)
ALP SERPL-CCNC: 45 U/L (ref 40–129)
ALT SERPL-CCNC: 52 U/L (ref 0–40)
ANION GAP SERPL CALCULATED.3IONS-SCNC: 8 MMOL/L (ref 7–16)
AST SERPL-CCNC: 26 U/L (ref 0–39)
BASOPHILS # BLD: 0.03 K/UL (ref 0–0.2)
BASOPHILS NFR BLD: 0 % (ref 0–2)
BILIRUB SERPL-MCNC: 0.3 MG/DL (ref 0–1.2)
BUN SERPL-MCNC: 20 MG/DL (ref 6–23)
CALCIUM SERPL-MCNC: 8.3 MG/DL (ref 8.6–10.2)
CHLORIDE SERPL-SCNC: 109 MMOL/L (ref 98–107)
CO2 SERPL-SCNC: 25 MMOL/L (ref 22–29)
CREAT SERPL-MCNC: 0.7 MG/DL (ref 0.7–1.2)
EOSINOPHIL # BLD: 0.32 K/UL (ref 0.05–0.5)
EOSINOPHILS RELATIVE PERCENT: 5 % (ref 0–6)
ERYTHROCYTE [DISTWIDTH] IN BLOOD BY AUTOMATED COUNT: 12.4 % (ref 11.5–15)
GFR SERPL CREATININE-BSD FRML MDRD: >60 ML/MIN/1.73M2
GLUCOSE SERPL-MCNC: 94 MG/DL (ref 74–99)
HCT VFR BLD AUTO: 29.7 % (ref 37–54)
HGB BLD-MCNC: 9.7 G/DL (ref 12.5–16.5)
IMM GRANULOCYTES # BLD AUTO: <0.03 K/UL (ref 0–0.58)
IMM GRANULOCYTES NFR BLD: 0 % (ref 0–5)
LYMPHOCYTES NFR BLD: 0.99 K/UL (ref 1.5–4)
LYMPHOCYTES RELATIVE PERCENT: 14 % (ref 20–42)
MAGNESIUM SERPL-MCNC: 1.6 MG/DL (ref 1.6–2.6)
MCH RBC QN AUTO: 29.8 PG (ref 26–35)
MCHC RBC AUTO-ENTMCNC: 32.7 G/DL (ref 32–34.5)
MCV RBC AUTO: 91.4 FL (ref 80–99.9)
MONOCYTES NFR BLD: 0.6 K/UL (ref 0.1–0.95)
MONOCYTES NFR BLD: 9 % (ref 2–12)
NEUTROPHILS NFR BLD: 72 % (ref 43–80)
NEUTS SEG NFR BLD: 4.98 K/UL (ref 1.8–7.3)
PHOSPHATE SERPL-MCNC: 2.4 MG/DL (ref 2.5–4.5)
PLATELET # BLD AUTO: 225 K/UL (ref 130–450)
PMV BLD AUTO: 10.3 FL (ref 7–12)
POTASSIUM SERPL-SCNC: 3.8 MMOL/L (ref 3.5–5)
PROT SERPL-MCNC: 5.2 G/DL (ref 6.4–8.3)
RBC # BLD AUTO: 3.25 M/UL (ref 3.8–5.8)
SODIUM SERPL-SCNC: 142 MMOL/L (ref 132–146)
WBC OTHER # BLD: 6.9 K/UL (ref 4.5–11.5)

## 2024-02-14 PROCEDURE — 2580000003 HC RX 258: Performed by: INTERNAL MEDICINE

## 2024-02-14 PROCEDURE — 85025 COMPLETE CBC W/AUTO DIFF WBC: CPT

## 2024-02-14 PROCEDURE — 83735 ASSAY OF MAGNESIUM: CPT

## 2024-02-14 PROCEDURE — 2500000003 HC RX 250 WO HCPCS: Performed by: NURSE PRACTITIONER

## 2024-02-14 PROCEDURE — 2580000003 HC RX 258: Performed by: NURSE PRACTITIONER

## 2024-02-14 PROCEDURE — 6360000002 HC RX W HCPCS: Performed by: INTERNAL MEDICINE

## 2024-02-14 PROCEDURE — 99232 SBSQ HOSP IP/OBS MODERATE 35: CPT | Performed by: SURGERY

## 2024-02-14 PROCEDURE — 97535 SELF CARE MNGMENT TRAINING: CPT

## 2024-02-14 PROCEDURE — 71045 X-RAY EXAM CHEST 1 VIEW: CPT

## 2024-02-14 PROCEDURE — 97165 OT EVAL LOW COMPLEX 30 MIN: CPT

## 2024-02-14 PROCEDURE — 84100 ASSAY OF PHOSPHORUS: CPT

## 2024-02-14 PROCEDURE — 99231 SBSQ HOSP IP/OBS SF/LOW 25: CPT | Performed by: NURSE PRACTITIONER

## 2024-02-14 PROCEDURE — 2060000000 HC ICU INTERMEDIATE R&B

## 2024-02-14 PROCEDURE — 80053 COMPREHEN METABOLIC PANEL: CPT

## 2024-02-14 PROCEDURE — 36415 COLL VENOUS BLD VENIPUNCTURE: CPT

## 2024-02-14 PROCEDURE — 6360000002 HC RX W HCPCS: Performed by: NURSE PRACTITIONER

## 2024-02-14 PROCEDURE — C9113 INJ PANTOPRAZOLE SODIUM, VIA: HCPCS | Performed by: INTERNAL MEDICINE

## 2024-02-14 RX ADMIN — THIAMINE HYDROCHLORIDE 100 MG: 100 INJECTION, SOLUTION INTRAMUSCULAR; INTRAVENOUS at 08:55

## 2024-02-14 RX ADMIN — PANTOPRAZOLE SODIUM 8 MG/HR: 40 INJECTION, POWDER, FOR SOLUTION INTRAVENOUS at 20:35

## 2024-02-14 RX ADMIN — DOXYCYCLINE 100 MG: 100 INJECTION, POWDER, LYOPHILIZED, FOR SOLUTION INTRAVENOUS at 11:08

## 2024-02-14 RX ADMIN — METRONIDAZOLE 500 MG: 500 INJECTION, SOLUTION INTRAVENOUS at 17:48

## 2024-02-14 RX ADMIN — DOXYCYCLINE 100 MG: 100 INJECTION, POWDER, LYOPHILIZED, FOR SOLUTION INTRAVENOUS at 22:07

## 2024-02-14 RX ADMIN — SODIUM CHLORIDE, SODIUM LACTATE, POTASSIUM CHLORIDE, CALCIUM CHLORIDE AND DEXTROSE MONOHYDRATE: 5; 600; 310; 30; 20 INJECTION, SOLUTION INTRAVENOUS at 15:24

## 2024-02-14 RX ADMIN — METRONIDAZOLE 500 MG: 500 INJECTION, SOLUTION INTRAVENOUS at 09:10

## 2024-02-14 RX ADMIN — SODIUM CHLORIDE, PRESERVATIVE FREE 10 ML: 5 INJECTION INTRAVENOUS at 22:08

## 2024-02-14 RX ADMIN — PANTOPRAZOLE SODIUM 8 MG/HR: 40 INJECTION, POWDER, FOR SOLUTION INTRAVENOUS at 02:26

## 2024-02-14 RX ADMIN — PANTOPRAZOLE SODIUM 8 MG/HR: 40 INJECTION, POWDER, FOR SOLUTION INTRAVENOUS at 09:42

## 2024-02-14 RX ADMIN — WATER 1000 MG: 1 INJECTION INTRAMUSCULAR; INTRAVENOUS; SUBCUTANEOUS at 08:55

## 2024-02-14 NOTE — CARE COORDINATION
Case Management Assessment  Initial Evaluation    Date/Time of Evaluation: 2/14/2024 6:39 PM  Assessment Completed by: Becki Flores RN    If patient is discharged prior to next notation, then this note serves as note for discharge by case management.    Patient Name: Jaron Alvarado                   YOB: 1950  Diagnosis: Enteritis [K52.9]  Gastrointestinal hemorrhage, unspecified gastrointestinal hemorrhage type [K92.2]  Nausea and vomiting, unspecified vomiting type [R11.2]                   Date / Time: 2/12/2024  7:41 PM    Patient Admission Status: Inpatient   Readmission Risk (Low < 19, Mod (19-27), High > 27): Readmission Risk Score: 16.7    Current PCP: Steve Ga, DO  PCP verified by CM? Yes (Steve Ga)    Chart Reviewed: Yes      History Provided by: Spouse (pts wife Emily)  Patient Orientation: Unable to Assess    Patient Cognition: Dementia / Early Alzheimer's    Hospitalization in the last 30 days (Readmission):  No    If yes, Readmission Assessment in CM Navigator will be completed.    Advance Directives:      Code Status: Limited   Patient's Primary Decision Maker is: Legal Next of Kin    Primary Decision Maker: Emily Elizabeth - Spouse - [phone]    Discharge Planning:    Patient lives with: Spouse/Significant Other Type of Home: House  Primary Care Giver: Family  Patient Support Systems include: Spouse/Significant Other, Children, Family Members   Current Financial resources:    Current community resources:    Current services prior to admission: None            Current DME:              Type of Home Care services:  None    ADLS  Prior functional level: Assistance with the following:, Bathing, Dressing, Toileting, Feeding, Cooking, Housework, Shopping, Mobility  Current functional level: Assistance with the following:, Bathing, Dressing, Toileting, Feeding, Cooking, Housework, Shopping, Mobility, Other (see comment)    PT AM-PAC: 10 /24  OT AM-PAC: 9

## 2024-02-14 NOTE — PLAN OF CARE
Problem: Safety - Adult  Goal: Free from fall injury  Outcome: Progressing     Problem: Discharge Planning  Goal: Discharge to home or other facility with appropriate resources  Outcome: Progressing  Flowsheets (Taken 2/13/2024 5030)  Discharge to home or other facility with appropriate resources:   Identify barriers to discharge with patient and caregiver   Arrange for needed discharge resources and transportation as appropriate   Identify discharge learning needs (meds, wound care, etc)   Arrange for interpreters to assist at discharge as needed   Refer to discharge planning if patient needs post-hospital services based on physician order or complex needs related to functional status, cognitive ability or social support system     Problem: Pain  Goal: Verbalizes/displays adequate comfort level or baseline comfort level  Outcome: Progressing     Problem: Skin/Tissue Integrity  Goal: Absence of new skin breakdown  Description: 1.  Monitor for areas of redness and/or skin breakdown  2.  Assess vascular access sites hourly  3.  Every 4-6 hours minimum:  Change oxygen saturation probe site  4.  Every 4-6 hours:  If on nasal continuous positive airway pressure, respiratory therapy assess nares and determine need for appliance change or resting period.  Outcome: Progressing     Problem: ABCDS Injury Assessment  Goal: Absence of physical injury  Outcome: Progressing

## 2024-02-15 ENCOUNTER — APPOINTMENT (OUTPATIENT)
Dept: CT IMAGING | Age: 74
DRG: 391 | End: 2024-02-15
Payer: MEDICARE

## 2024-02-15 LAB
ALBUMIN SERPL-MCNC: 3.5 G/DL (ref 3.5–5.2)
ALP SERPL-CCNC: 49 U/L (ref 40–129)
ALT SERPL-CCNC: 41 U/L (ref 0–40)
ANION GAP SERPL CALCULATED.3IONS-SCNC: 10 MMOL/L (ref 7–16)
AST SERPL-CCNC: 19 U/L (ref 0–39)
BASOPHILS # BLD: 0.02 K/UL (ref 0–0.2)
BASOPHILS NFR BLD: 0 % (ref 0–2)
BILIRUB SERPL-MCNC: 0.4 MG/DL (ref 0–1.2)
BUN SERPL-MCNC: 10 MG/DL (ref 6–23)
CALCIUM SERPL-MCNC: 7.9 MG/DL (ref 8.6–10.2)
CHLORIDE SERPL-SCNC: 103 MMOL/L (ref 98–107)
CO2 SERPL-SCNC: 26 MMOL/L (ref 22–29)
CREAT SERPL-MCNC: 0.7 MG/DL (ref 0.7–1.2)
EOSINOPHIL # BLD: 0.22 K/UL (ref 0.05–0.5)
EOSINOPHILS RELATIVE PERCENT: 3 % (ref 0–6)
ERYTHROCYTE [DISTWIDTH] IN BLOOD BY AUTOMATED COUNT: 12 % (ref 11.5–15)
GFR SERPL CREATININE-BSD FRML MDRD: >60 ML/MIN/1.73M2
GLUCOSE SERPL-MCNC: 109 MG/DL (ref 74–99)
HCT VFR BLD AUTO: 27.8 % (ref 37–54)
HGB BLD-MCNC: 9.6 G/DL (ref 12.5–16.5)
IMM GRANULOCYTES # BLD AUTO: 0.03 K/UL (ref 0–0.58)
IMM GRANULOCYTES NFR BLD: 0 % (ref 0–5)
L PNEUMO1 AG UR QL IA.RAPID: NEGATIVE
LYMPHOCYTES NFR BLD: 0.94 K/UL (ref 1.5–4)
LYMPHOCYTES RELATIVE PERCENT: 13 % (ref 20–42)
MAGNESIUM SERPL-MCNC: 1.5 MG/DL (ref 1.6–2.6)
MCH RBC QN AUTO: 30.2 PG (ref 26–35)
MCHC RBC AUTO-ENTMCNC: 34.5 G/DL (ref 32–34.5)
MCV RBC AUTO: 87.4 FL (ref 80–99.9)
MONOCYTES NFR BLD: 0.69 K/UL (ref 0.1–0.95)
MONOCYTES NFR BLD: 9 % (ref 2–12)
NEUTROPHILS NFR BLD: 75 % (ref 43–80)
NEUTS SEG NFR BLD: 5.61 K/UL (ref 1.8–7.3)
PHOSPHATE SERPL-MCNC: 2.9 MG/DL (ref 2.5–4.5)
PLATELET # BLD AUTO: 221 K/UL (ref 130–450)
PMV BLD AUTO: 10 FL (ref 7–12)
POTASSIUM SERPL-SCNC: 3.2 MMOL/L (ref 3.5–5)
PROT SERPL-MCNC: 5.5 G/DL (ref 6.4–8.3)
RBC # BLD AUTO: 3.18 M/UL (ref 3.8–5.8)
S PNEUM AG SPEC QL: NEGATIVE
SODIUM SERPL-SCNC: 139 MMOL/L (ref 132–146)
SPECIMEN SOURCE: NORMAL
WBC OTHER # BLD: 7.5 K/UL (ref 4.5–11.5)

## 2024-02-15 PROCEDURE — 6360000002 HC RX W HCPCS: Performed by: INTERNAL MEDICINE

## 2024-02-15 PROCEDURE — 85025 COMPLETE CBC W/AUTO DIFF WBC: CPT

## 2024-02-15 PROCEDURE — 99231 SBSQ HOSP IP/OBS SF/LOW 25: CPT | Performed by: NURSE PRACTITIONER

## 2024-02-15 PROCEDURE — 2060000000 HC ICU INTERMEDIATE R&B

## 2024-02-15 PROCEDURE — 84100 ASSAY OF PHOSPHORUS: CPT

## 2024-02-15 PROCEDURE — 83735 ASSAY OF MAGNESIUM: CPT

## 2024-02-15 PROCEDURE — 6370000000 HC RX 637 (ALT 250 FOR IP): Performed by: SURGERY

## 2024-02-15 PROCEDURE — 2500000003 HC RX 250 WO HCPCS: Performed by: NURSE PRACTITIONER

## 2024-02-15 PROCEDURE — 2580000003 HC RX 258: Performed by: INTERNAL MEDICINE

## 2024-02-15 PROCEDURE — 2580000003 HC RX 258: Performed by: NURSE PRACTITIONER

## 2024-02-15 PROCEDURE — 6360000002 HC RX W HCPCS: Performed by: NURSE PRACTITIONER

## 2024-02-15 PROCEDURE — 6370000000 HC RX 637 (ALT 250 FOR IP): Performed by: INTERNAL MEDICINE

## 2024-02-15 PROCEDURE — 36415 COLL VENOUS BLD VENIPUNCTURE: CPT

## 2024-02-15 PROCEDURE — 80053 COMPREHEN METABOLIC PANEL: CPT

## 2024-02-15 PROCEDURE — C9113 INJ PANTOPRAZOLE SODIUM, VIA: HCPCS | Performed by: INTERNAL MEDICINE

## 2024-02-15 PROCEDURE — 6360000004 HC RX CONTRAST MEDICATION: Performed by: RADIOLOGY

## 2024-02-15 PROCEDURE — 74177 CT ABD & PELVIS W/CONTRAST: CPT

## 2024-02-15 PROCEDURE — 51702 INSERT TEMP BLADDER CATH: CPT

## 2024-02-15 PROCEDURE — 92610 EVALUATE SWALLOWING FUNCTION: CPT | Performed by: SPEECH-LANGUAGE PATHOLOGIST

## 2024-02-15 RX ORDER — DOCUSATE SODIUM 100 MG/1
100 CAPSULE, LIQUID FILLED ORAL DAILY
Status: DISCONTINUED | OUTPATIENT
Start: 2024-02-15 | End: 2024-02-19 | Stop reason: HOSPADM

## 2024-02-15 RX ORDER — SENNOSIDES A AND B 8.6 MG/1
1 TABLET, FILM COATED ORAL NIGHTLY
Status: DISCONTINUED | OUTPATIENT
Start: 2024-02-15 | End: 2024-02-19 | Stop reason: HOSPADM

## 2024-02-15 RX ADMIN — IOPAMIDOL 18 ML: 755 INJECTION, SOLUTION INTRAVENOUS at 14:45

## 2024-02-15 RX ADMIN — CARBIDOPA AND LEVODOPA 2 TABLET: 25; 100 TABLET ORAL at 18:27

## 2024-02-15 RX ADMIN — SODIUM CHLORIDE, PRESERVATIVE FREE 10 ML: 5 INJECTION INTRAVENOUS at 20:34

## 2024-02-15 RX ADMIN — SODIUM CHLORIDE, SODIUM LACTATE, POTASSIUM CHLORIDE, CALCIUM CHLORIDE AND DEXTROSE MONOHYDRATE: 5; 600; 310; 30; 20 INJECTION, SOLUTION INTRAVENOUS at 19:13

## 2024-02-15 RX ADMIN — METRONIDAZOLE 500 MG: 500 INJECTION, SOLUTION INTRAVENOUS at 23:29

## 2024-02-15 RX ADMIN — DOCUSATE SODIUM 100 MG: 100 CAPSULE, LIQUID FILLED ORAL at 18:27

## 2024-02-15 RX ADMIN — ACETAMINOPHEN 650 MG: 325 TABLET ORAL at 10:37

## 2024-02-15 RX ADMIN — IOPAMIDOL 75 ML: 755 INJECTION, SOLUTION INTRAVENOUS at 14:44

## 2024-02-15 RX ADMIN — THIAMINE HYDROCHLORIDE 100 MG: 100 INJECTION, SOLUTION INTRAMUSCULAR; INTRAVENOUS at 10:08

## 2024-02-15 RX ADMIN — DOXYCYCLINE 100 MG: 100 INJECTION, POWDER, LYOPHILIZED, FOR SOLUTION INTRAVENOUS at 10:20

## 2024-02-15 RX ADMIN — CARBIDOPA AND LEVODOPA 2 TABLET: 25; 100 TABLET ORAL at 10:07

## 2024-02-15 RX ADMIN — DOXYCYCLINE 100 MG: 100 INJECTION, POWDER, LYOPHILIZED, FOR SOLUTION INTRAVENOUS at 23:37

## 2024-02-15 RX ADMIN — CARBIDOPA AND LEVODOPA 2 TABLET: 25; 100 TABLET ORAL at 15:30

## 2024-02-15 RX ADMIN — NITROFURANTOIN 100 MG: 100 CAPSULE ORAL at 10:07

## 2024-02-15 RX ADMIN — PANTOPRAZOLE SODIUM 8 MG/HR: 40 INJECTION, POWDER, FOR SOLUTION INTRAVENOUS at 19:12

## 2024-02-15 RX ADMIN — SENNOSIDES 8.6 MG: 8.6 TABLET, FILM COATED ORAL at 20:34

## 2024-02-15 RX ADMIN — PANTOPRAZOLE SODIUM 8 MG/HR: 40 INJECTION, POWDER, FOR SOLUTION INTRAVENOUS at 08:26

## 2024-02-15 RX ADMIN — METRONIDAZOLE 500 MG: 500 INJECTION, SOLUTION INTRAVENOUS at 00:42

## 2024-02-15 RX ADMIN — WATER 1000 MG: 1 INJECTION INTRAMUSCULAR; INTRAVENOUS; SUBCUTANEOUS at 10:08

## 2024-02-15 RX ADMIN — POTASSIUM CHLORIDE 40 MEQ: 1500 TABLET, EXTENDED RELEASE ORAL at 16:22

## 2024-02-15 RX ADMIN — MAGNESIUM SULFATE HEPTAHYDRATE 2000 MG: 40 INJECTION, SOLUTION INTRAVENOUS at 16:22

## 2024-02-15 RX ADMIN — METRONIDAZOLE 500 MG: 500 INJECTION, SOLUTION INTRAVENOUS at 16:57

## 2024-02-15 RX ADMIN — METRONIDAZOLE 500 MG: 500 INJECTION, SOLUTION INTRAVENOUS at 10:34

## 2024-02-15 RX ADMIN — CARBIDOPA AND LEVODOPA 2 TABLET: 25; 100 TABLET ORAL at 20:34

## 2024-02-15 ASSESSMENT — PAIN SCALES - WONG BAKER: WONGBAKER_NUMERICALRESPONSE: 2

## 2024-02-16 LAB
ABO/RH: NORMAL
ALBUMIN SERPL-MCNC: 3.3 G/DL (ref 3.5–5.2)
ALP SERPL-CCNC: 48 U/L (ref 40–129)
ALT SERPL-CCNC: 8 U/L (ref 0–40)
ANION GAP SERPL CALCULATED.3IONS-SCNC: 10 MMOL/L (ref 7–16)
ANTIBODY SCREEN: NEGATIVE
ARM BAND NUMBER: NORMAL
AST SERPL-CCNC: 16 U/L (ref 0–39)
BASOPHILS # BLD: 0.02 K/UL (ref 0–0.2)
BASOPHILS NFR BLD: 0 % (ref 0–2)
BILIRUB SERPL-MCNC: 0.4 MG/DL (ref 0–1.2)
BLOOD BANK DISPENSE STATUS: NORMAL
BLOOD BANK DISPENSE STATUS: NORMAL
BLOOD BANK SAMPLE EXPIRATION: NORMAL
BPU ID: NORMAL
BPU ID: NORMAL
BUN SERPL-MCNC: 6 MG/DL (ref 6–23)
CALCIUM SERPL-MCNC: 8 MG/DL (ref 8.6–10.2)
CHLORIDE SERPL-SCNC: 104 MMOL/L (ref 98–107)
CO2 SERPL-SCNC: 24 MMOL/L (ref 22–29)
COMPONENT: NORMAL
COMPONENT: NORMAL
CREAT SERPL-MCNC: 0.6 MG/DL (ref 0.7–1.2)
CROSSMATCH RESULT: NORMAL
CROSSMATCH RESULT: NORMAL
EOSINOPHIL # BLD: 0.25 K/UL (ref 0.05–0.5)
EOSINOPHILS RELATIVE PERCENT: 4 % (ref 0–6)
ERYTHROCYTE [DISTWIDTH] IN BLOOD BY AUTOMATED COUNT: 11.9 % (ref 11.5–15)
GFR SERPL CREATININE-BSD FRML MDRD: >60 ML/MIN/1.73M2
GLUCOSE SERPL-MCNC: 113 MG/DL (ref 74–99)
HCT VFR BLD AUTO: 27.7 % (ref 37–54)
HGB BLD-MCNC: 9.6 G/DL (ref 12.5–16.5)
IMM GRANULOCYTES # BLD AUTO: <0.03 K/UL (ref 0–0.58)
IMM GRANULOCYTES NFR BLD: 0 % (ref 0–5)
LYMPHOCYTES NFR BLD: 0.78 K/UL (ref 1.5–4)
LYMPHOCYTES RELATIVE PERCENT: 13 % (ref 20–42)
MAGNESIUM SERPL-MCNC: 2 MG/DL (ref 1.6–2.6)
MCH RBC QN AUTO: 29.6 PG (ref 26–35)
MCHC RBC AUTO-ENTMCNC: 34.7 G/DL (ref 32–34.5)
MCV RBC AUTO: 85.5 FL (ref 80–99.9)
MICROORGANISM SPEC CULT: ABNORMAL
MONOCYTES NFR BLD: 0.54 K/UL (ref 0.1–0.95)
MONOCYTES NFR BLD: 9 % (ref 2–12)
NEUTROPHILS NFR BLD: 74 % (ref 43–80)
NEUTS SEG NFR BLD: 4.61 K/UL (ref 1.8–7.3)
PLATELET # BLD AUTO: 222 K/UL (ref 130–450)
PMV BLD AUTO: 9.7 FL (ref 7–12)
POTASSIUM SERPL-SCNC: 3.4 MMOL/L (ref 3.5–5)
PROT SERPL-MCNC: 5.2 G/DL (ref 6.4–8.3)
RBC # BLD AUTO: 3.24 M/UL (ref 3.8–5.8)
SODIUM SERPL-SCNC: 138 MMOL/L (ref 132–146)
SPECIMEN DESCRIPTION: ABNORMAL
SURGICAL PATHOLOGY REPORT: NORMAL
TRANSFUSION STATUS: NORMAL
TRANSFUSION STATUS: NORMAL
UNIT DIVISION: 0
UNIT DIVISION: 0
WBC OTHER # BLD: 6.2 K/UL (ref 4.5–11.5)

## 2024-02-16 PROCEDURE — 36415 COLL VENOUS BLD VENIPUNCTURE: CPT

## 2024-02-16 PROCEDURE — 2500000003 HC RX 250 WO HCPCS: Performed by: NURSE PRACTITIONER

## 2024-02-16 PROCEDURE — 97110 THERAPEUTIC EXERCISES: CPT

## 2024-02-16 PROCEDURE — C9113 INJ PANTOPRAZOLE SODIUM, VIA: HCPCS | Performed by: INTERNAL MEDICINE

## 2024-02-16 PROCEDURE — 92526 ORAL FUNCTION THERAPY: CPT | Performed by: SPEECH-LANGUAGE PATHOLOGIST

## 2024-02-16 PROCEDURE — 6370000000 HC RX 637 (ALT 250 FOR IP): Performed by: INTERNAL MEDICINE

## 2024-02-16 PROCEDURE — 83735 ASSAY OF MAGNESIUM: CPT

## 2024-02-16 PROCEDURE — 85025 COMPLETE CBC W/AUTO DIFF WBC: CPT

## 2024-02-16 PROCEDURE — 2060000000 HC ICU INTERMEDIATE R&B

## 2024-02-16 PROCEDURE — 6370000000 HC RX 637 (ALT 250 FOR IP): Performed by: SURGERY

## 2024-02-16 PROCEDURE — 97530 THERAPEUTIC ACTIVITIES: CPT

## 2024-02-16 PROCEDURE — 2580000003 HC RX 258: Performed by: INTERNAL MEDICINE

## 2024-02-16 PROCEDURE — 99232 SBSQ HOSP IP/OBS MODERATE 35: CPT | Performed by: SURGERY

## 2024-02-16 PROCEDURE — 80053 COMPREHEN METABOLIC PANEL: CPT

## 2024-02-16 PROCEDURE — 6360000002 HC RX W HCPCS: Performed by: INTERNAL MEDICINE

## 2024-02-16 PROCEDURE — 2580000003 HC RX 258: Performed by: NURSE PRACTITIONER

## 2024-02-16 PROCEDURE — 6360000002 HC RX W HCPCS: Performed by: NURSE PRACTITIONER

## 2024-02-16 RX ORDER — BISACODYL 10 MG
10 SUPPOSITORY, RECTAL RECTAL ONCE
Status: DISCONTINUED | OUTPATIENT
Start: 2024-02-16 | End: 2024-02-19 | Stop reason: HOSPADM

## 2024-02-16 RX ORDER — SUCRALFATE 1 G/1
1 TABLET ORAL EVERY 8 HOURS SCHEDULED
Status: DISCONTINUED | OUTPATIENT
Start: 2024-02-16 | End: 2024-02-19 | Stop reason: HOSPADM

## 2024-02-16 RX ADMIN — ACETAMINOPHEN 650 MG: 325 TABLET ORAL at 13:22

## 2024-02-16 RX ADMIN — SODIUM CHLORIDE, PRESERVATIVE FREE 10 ML: 5 INJECTION INTRAVENOUS at 20:50

## 2024-02-16 RX ADMIN — PANTOPRAZOLE SODIUM 8 MG/HR: 40 INJECTION, POWDER, FOR SOLUTION INTRAVENOUS at 05:11

## 2024-02-16 RX ADMIN — PANTOPRAZOLE SODIUM 8 MG/HR: 40 INJECTION, POWDER, FOR SOLUTION INTRAVENOUS at 15:47

## 2024-02-16 RX ADMIN — NITROFURANTOIN 100 MG: 100 CAPSULE ORAL at 09:31

## 2024-02-16 RX ADMIN — SUCRALFATE 1 G: 1 TABLET ORAL at 20:49

## 2024-02-16 RX ADMIN — CARBIDOPA AND LEVODOPA 2 TABLET: 25; 100 TABLET ORAL at 13:22

## 2024-02-16 RX ADMIN — SODIUM CHLORIDE, SODIUM LACTATE, POTASSIUM CHLORIDE, CALCIUM CHLORIDE AND DEXTROSE MONOHYDRATE: 5; 600; 310; 30; 20 INJECTION, SOLUTION INTRAVENOUS at 09:22

## 2024-02-16 RX ADMIN — CARBIDOPA AND LEVODOPA 2 TABLET: 25; 100 TABLET ORAL at 09:31

## 2024-02-16 RX ADMIN — CARBIDOPA AND LEVODOPA 2 TABLET: 25; 100 TABLET ORAL at 17:12

## 2024-02-16 RX ADMIN — MINERAL OIL 330 ML: 1000 LIQUID ORAL at 15:42

## 2024-02-16 RX ADMIN — DOXYCYCLINE 100 MG: 100 INJECTION, POWDER, LYOPHILIZED, FOR SOLUTION INTRAVENOUS at 09:31

## 2024-02-16 RX ADMIN — DOXYCYCLINE 100 MG: 100 INJECTION, POWDER, LYOPHILIZED, FOR SOLUTION INTRAVENOUS at 23:21

## 2024-02-16 RX ADMIN — METRONIDAZOLE 500 MG: 500 INJECTION, SOLUTION INTRAVENOUS at 17:14

## 2024-02-16 RX ADMIN — CARBIDOPA AND LEVODOPA 2 TABLET: 25; 100 TABLET ORAL at 20:49

## 2024-02-16 RX ADMIN — METRONIDAZOLE 500 MG: 500 INJECTION, SOLUTION INTRAVENOUS at 09:27

## 2024-02-16 RX ADMIN — WATER 1000 MG: 1 INJECTION INTRAMUSCULAR; INTRAVENOUS; SUBCUTANEOUS at 09:35

## 2024-02-16 RX ADMIN — THIAMINE HYDROCHLORIDE 100 MG: 100 INJECTION, SOLUTION INTRAMUSCULAR; INTRAVENOUS at 09:32

## 2024-02-16 RX ADMIN — SUCRALFATE 1 G: 1 TABLET ORAL at 13:22

## 2024-02-16 RX ADMIN — DOCUSATE SODIUM 100 MG: 100 CAPSULE, LIQUID FILLED ORAL at 09:31

## 2024-02-16 RX ADMIN — SENNOSIDES 8.6 MG: 8.6 TABLET, FILM COATED ORAL at 20:49

## 2024-02-16 RX ADMIN — METRONIDAZOLE 500 MG: 500 INJECTION, SOLUTION INTRAVENOUS at 23:22

## 2024-02-16 RX ADMIN — SUCRALFATE 1 G: 1 TABLET ORAL at 09:31

## 2024-02-16 ASSESSMENT — PAIN SCALES - GENERAL: PAINLEVEL_OUTOF10: 0

## 2024-02-16 ASSESSMENT — PAIN SCALES - WONG BAKER: WONGBAKER_NUMERICALRESPONSE: 2

## 2024-02-16 NOTE — PLAN OF CARE
Problem: Safety - Adult  Goal: Free from fall injury  Outcome: Progressing     Problem: Discharge Planning  Goal: Discharge to home or other facility with appropriate resources  Outcome: Progressing  Flowsheets (Taken 2/16/2024 8195)  Discharge to home or other facility with appropriate resources:   Identify barriers to discharge with patient and caregiver   Arrange for needed discharge resources and transportation as appropriate     Problem: Pain  Goal: Verbalizes/displays adequate comfort level or baseline comfort level  Outcome: Progressing     Problem: Skin/Tissue Integrity  Goal: Absence of new skin breakdown  Description: 1.  Monitor for areas of redness and/or skin breakdown  2.  Assess vascular access sites hourly  3.  Every 4-6 hours minimum:  Change oxygen saturation probe site  4.  Every 4-6 hours:  If on nasal continuous positive airway pressure, respiratory therapy assess nares and determine need for appliance change or resting period.  Outcome: Progressing     Problem: ABCDS Injury Assessment  Goal: Absence of physical injury  Outcome: Progressing     Problem: Nutrition Deficit:  Goal: Optimize nutritional status  Outcome: Progressing

## 2024-02-16 NOTE — PLAN OF CARE
Problem: Safety - Adult  Goal: Free from fall injury  Outcome: Progressing     Problem: Discharge Planning  Goal: Discharge to home or other facility with appropriate resources  Outcome: Progressing  Flowsheets (Taken 2/15/2024 0815 by [employer], RN)  Discharge to home or other facility with appropriate resources:   Identify barriers to discharge with patient and caregiver   Arrange for needed discharge resources and transportation as appropriate     Problem: Pain  Goal: Verbalizes/displays adequate comfort level or baseline comfort level  Outcome: Progressing  Flowsheets (Taken 2/15/2024 1208 by [employer], RN)  Verbalizes/displays adequate comfort level or baseline comfort level: Encourage patient to monitor pain and request assistance     Problem: Skin/Tissue Integrity  Goal: Absence of new skin breakdown  Description: 1.  Monitor for areas of redness and/or skin breakdown  2.  Assess vascular access sites hourly  3.  Every 4-6 hours minimum:  Change oxygen saturation probe site  4.  Every 4-6 hours:  If on nasal continuous positive airway pressure, respiratory therapy assess nares and determine need for appliance change or resting period.  Outcome: Progressing     Problem: ABCDS Injury Assessment  Goal: Absence of physical injury  Outcome: Progressing     Problem: Nutrition Deficit:  Goal: Optimize nutritional status  Outcome: Progressing

## 2024-02-16 NOTE — CARE COORDINATION
2/16/24 1501 CM note: covid (-) 2/13/24, bl cx 2/13/24 ngtd. IVF & protonix gtt and IV vibramycin, flagyl, & rocephin. PIV x 2. Room air. Telesitter. Hx parkinson's disease. Palliative following, limited code (meds and defib/cardiovert only). [facility] was consulted for information only, provided information to pts wife, and have signed off. EGD 2/13/24 shows necrotic esophagitis. Speech following, pt on regular diet. Discharge plan is home with [facility] for PT/OT only. Per [facility] liaison, they can accept. [facility] order noted. IF pt discharges over the weekend please notify [facility] main office at [phone]. Updated pts wife Emily on above. Pt resides with his wife and his daughter lives nearby. Emily and her daughter provide most of his care, and his sisters would come to sit with him sometimes. Pts DME includes a ww rollator, wc, and bed w/ HOB/FOB that elevates. Pt has hx [facility], [place], [facility], and [place]. Family will provide transportation home. CM will follow. Electronically signed by Becki Flores RN on 2/16/2024 at 3:53 PM

## 2024-02-17 LAB
ALBUMIN SERPL-MCNC: 3.5 G/DL (ref 3.5–5.2)
ALP SERPL-CCNC: 50 U/L (ref 40–129)
ALT SERPL-CCNC: 11 U/L (ref 0–40)
ANION GAP SERPL CALCULATED.3IONS-SCNC: 6 MMOL/L (ref 7–16)
AST SERPL-CCNC: 12 U/L (ref 0–39)
BASOPHILS # BLD: 0.03 K/UL (ref 0–0.2)
BASOPHILS NFR BLD: 1 % (ref 0–2)
BILIRUB SERPL-MCNC: 0.2 MG/DL (ref 0–1.2)
BUN SERPL-MCNC: 6 MG/DL (ref 6–23)
CALCIUM SERPL-MCNC: 8.5 MG/DL (ref 8.6–10.2)
CHLORIDE SERPL-SCNC: 101 MMOL/L (ref 98–107)
CO2 SERPL-SCNC: 27 MMOL/L (ref 22–29)
CREAT SERPL-MCNC: 0.7 MG/DL (ref 0.7–1.2)
EOSINOPHIL # BLD: 0.15 K/UL (ref 0.05–0.5)
EOSINOPHILS RELATIVE PERCENT: 4 % (ref 0–6)
ERYTHROCYTE [DISTWIDTH] IN BLOOD BY AUTOMATED COUNT: 11.9 % (ref 11.5–15)
GFR SERPL CREATININE-BSD FRML MDRD: >60 ML/MIN/1.73M2
GLUCOSE SERPL-MCNC: 106 MG/DL (ref 74–99)
HCT VFR BLD AUTO: 31.6 % (ref 37–54)
HGB BLD-MCNC: 10.9 G/DL (ref 12.5–16.5)
IMM GRANULOCYTES # BLD AUTO: <0.03 K/UL (ref 0–0.58)
IMM GRANULOCYTES NFR BLD: 1 % (ref 0–5)
LYMPHOCYTES NFR BLD: 0.65 K/UL (ref 1.5–4)
LYMPHOCYTES RELATIVE PERCENT: 17 % (ref 20–42)
MAGNESIUM SERPL-MCNC: 1.7 MG/DL (ref 1.6–2.6)
MCH RBC QN AUTO: 30.1 PG (ref 26–35)
MCHC RBC AUTO-ENTMCNC: 34.5 G/DL (ref 32–34.5)
MCV RBC AUTO: 87.3 FL (ref 80–99.9)
MONOCYTES NFR BLD: 0.54 K/UL (ref 0.1–0.95)
MONOCYTES NFR BLD: 14 % (ref 2–12)
NEUTROPHILS NFR BLD: 64 % (ref 43–80)
NEUTS SEG NFR BLD: 2.45 K/UL (ref 1.8–7.3)
PLATELET # BLD AUTO: 290 K/UL (ref 130–450)
PMV BLD AUTO: 10.1 FL (ref 7–12)
POTASSIUM SERPL-SCNC: 3.2 MMOL/L (ref 3.5–5)
PROT SERPL-MCNC: 5.8 G/DL (ref 6.4–8.3)
RBC # BLD AUTO: 3.62 M/UL (ref 3.8–5.8)
SODIUM SERPL-SCNC: 134 MMOL/L (ref 132–146)
WBC OTHER # BLD: 3.8 K/UL (ref 4.5–11.5)

## 2024-02-17 PROCEDURE — 6370000000 HC RX 637 (ALT 250 FOR IP): Performed by: INTERNAL MEDICINE

## 2024-02-17 PROCEDURE — 80053 COMPREHEN METABOLIC PANEL: CPT

## 2024-02-17 PROCEDURE — 85025 COMPLETE CBC W/AUTO DIFF WBC: CPT

## 2024-02-17 PROCEDURE — 2500000003 HC RX 250 WO HCPCS: Performed by: NURSE PRACTITIONER

## 2024-02-17 PROCEDURE — 6370000000 HC RX 637 (ALT 250 FOR IP): Performed by: SURGERY

## 2024-02-17 PROCEDURE — 36415 COLL VENOUS BLD VENIPUNCTURE: CPT

## 2024-02-17 PROCEDURE — 6360000002 HC RX W HCPCS: Performed by: INTERNAL MEDICINE

## 2024-02-17 PROCEDURE — 2060000000 HC ICU INTERMEDIATE R&B

## 2024-02-17 PROCEDURE — C9113 INJ PANTOPRAZOLE SODIUM, VIA: HCPCS | Performed by: INTERNAL MEDICINE

## 2024-02-17 PROCEDURE — 2580000003 HC RX 258: Performed by: NURSE PRACTITIONER

## 2024-02-17 PROCEDURE — 2580000003 HC RX 258: Performed by: INTERNAL MEDICINE

## 2024-02-17 PROCEDURE — 6360000002 HC RX W HCPCS: Performed by: NURSE PRACTITIONER

## 2024-02-17 PROCEDURE — 83735 ASSAY OF MAGNESIUM: CPT

## 2024-02-17 RX ORDER — PANTOPRAZOLE SODIUM 40 MG/1
40 TABLET, DELAYED RELEASE ORAL
Status: DISCONTINUED | OUTPATIENT
Start: 2024-02-18 | End: 2024-02-19 | Stop reason: HOSPADM

## 2024-02-17 RX ADMIN — NITROFURANTOIN 100 MG: 100 CAPSULE ORAL at 12:51

## 2024-02-17 RX ADMIN — WATER 1000 MG: 1 INJECTION INTRAMUSCULAR; INTRAVENOUS; SUBCUTANEOUS at 12:44

## 2024-02-17 RX ADMIN — PANTOPRAZOLE SODIUM 8 MG/HR: 40 INJECTION, POWDER, FOR SOLUTION INTRAVENOUS at 02:30

## 2024-02-17 RX ADMIN — SUCRALFATE 1 G: 1 TABLET ORAL at 21:31

## 2024-02-17 RX ADMIN — SODIUM CHLORIDE, PRESERVATIVE FREE 10 ML: 5 INJECTION INTRAVENOUS at 21:37

## 2024-02-17 RX ADMIN — CARBIDOPA AND LEVODOPA 2 TABLET: 25; 100 TABLET ORAL at 21:31

## 2024-02-17 RX ADMIN — CARBIDOPA AND LEVODOPA 2 TABLET: 25; 100 TABLET ORAL at 16:33

## 2024-02-17 RX ADMIN — SODIUM CHLORIDE, PRESERVATIVE FREE 10 ML: 5 INJECTION INTRAVENOUS at 13:08

## 2024-02-17 RX ADMIN — CARBIDOPA AND LEVODOPA 2 TABLET: 25; 100 TABLET ORAL at 12:51

## 2024-02-17 RX ADMIN — DOXYCYCLINE 100 MG: 100 INJECTION, POWDER, LYOPHILIZED, FOR SOLUTION INTRAVENOUS at 14:18

## 2024-02-17 RX ADMIN — SENNOSIDES 8.6 MG: 8.6 TABLET, FILM COATED ORAL at 21:31

## 2024-02-17 RX ADMIN — THIAMINE HYDROCHLORIDE 100 MG: 100 INJECTION, SOLUTION INTRAMUSCULAR; INTRAVENOUS at 12:43

## 2024-02-17 RX ADMIN — METRONIDAZOLE 500 MG: 500 INJECTION, SOLUTION INTRAVENOUS at 16:29

## 2024-02-17 RX ADMIN — POTASSIUM CHLORIDE 40 MEQ: 1500 TABLET, EXTENDED RELEASE ORAL at 21:31

## 2024-02-17 RX ADMIN — SUCRALFATE 1 G: 1 TABLET ORAL at 16:33

## 2024-02-17 RX ADMIN — SUCRALFATE 1 G: 1 TABLET ORAL at 04:58

## 2024-02-17 RX ADMIN — METRONIDAZOLE 500 MG: 500 INJECTION, SOLUTION INTRAVENOUS at 13:02

## 2024-02-17 RX ADMIN — DOXYCYCLINE 100 MG: 100 INJECTION, POWDER, LYOPHILIZED, FOR SOLUTION INTRAVENOUS at 22:04

## 2024-02-17 NOTE — PLAN OF CARE
Problem: Safety - Adult  Goal: Free from fall injury  2/17/2024 0349 by Rashaun Jain RN  Outcome: Progressing     Problem: Discharge Planning  Goal: Discharge to home or other facility with appropriate resources  2/17/2024 0349 by Rashaun Jain RN  Outcome: Progressing     Problem: Pain  Goal: Verbalizes/displays adequate comfort level or baseline comfort level  2/17/2024 0349 by Rashaun Jain RN  Outcome: Progressing     Problem: Skin/Tissue Integrity  Goal: Absence of new skin breakdown  Description: 1.  Monitor for areas of redness and/or skin breakdown  2.  Assess vascular access sites hourly  3.  Every 4-6 hours minimum:  Change oxygen saturation probe site  4.  Every 4-6 hours:  If on nasal continuous positive airway pressure, respiratory therapy assess nares and determine need for appliance change or resting period.  2/17/2024 0349 by Rashaun Jain RN  Outcome: Progressing     Problem: ABCDS Injury Assessment  Goal: Absence of physical injury  2/17/2024 0349 by Rashaun Jain, RN  Outcome: Progressing     Problem: Nutrition Deficit:  Goal: Optimize nutritional status  2/17/2024 0349 by Rashaun Jain RN  Outcome: Progressing

## 2024-02-18 LAB
ALBUMIN SERPL-MCNC: 3.4 G/DL (ref 3.5–5.2)
ALP SERPL-CCNC: 53 U/L (ref 40–129)
ALT SERPL-CCNC: 8 U/L (ref 0–40)
ANION GAP SERPL CALCULATED.3IONS-SCNC: 12 MMOL/L (ref 7–16)
AST SERPL-CCNC: 13 U/L (ref 0–39)
BASOPHILS # BLD: 0.03 K/UL (ref 0–0.2)
BASOPHILS NFR BLD: 1 % (ref 0–2)
BILIRUB SERPL-MCNC: 0.3 MG/DL (ref 0–1.2)
BUN SERPL-MCNC: 8 MG/DL (ref 6–23)
CALCIUM SERPL-MCNC: 8.6 MG/DL (ref 8.6–10.2)
CHLORIDE SERPL-SCNC: 99 MMOL/L (ref 98–107)
CO2 SERPL-SCNC: 23 MMOL/L (ref 22–29)
CREAT SERPL-MCNC: 0.7 MG/DL (ref 0.7–1.2)
EOSINOPHIL # BLD: 0.3 K/UL (ref 0.05–0.5)
EOSINOPHILS RELATIVE PERCENT: 7 % (ref 0–6)
ERYTHROCYTE [DISTWIDTH] IN BLOOD BY AUTOMATED COUNT: 12.1 % (ref 11.5–15)
GFR SERPL CREATININE-BSD FRML MDRD: >60 ML/MIN/1.73M2
GLUCOSE SERPL-MCNC: 90 MG/DL (ref 74–99)
HCT VFR BLD AUTO: 34.8 % (ref 37–54)
HGB BLD-MCNC: 11.5 G/DL (ref 12.5–16.5)
IMM GRANULOCYTES # BLD AUTO: <0.03 K/UL (ref 0–0.58)
IMM GRANULOCYTES NFR BLD: 0 % (ref 0–5)
LYMPHOCYTES NFR BLD: 0.84 K/UL (ref 1.5–4)
LYMPHOCYTES RELATIVE PERCENT: 20 % (ref 20–42)
MAGNESIUM SERPL-MCNC: 1.9 MG/DL (ref 1.6–2.6)
MCH RBC QN AUTO: 29.6 PG (ref 26–35)
MCHC RBC AUTO-ENTMCNC: 33 G/DL (ref 32–34.5)
MCV RBC AUTO: 89.7 FL (ref 80–99.9)
MICROORGANISM SPEC CULT: NORMAL
MICROORGANISM SPEC CULT: NORMAL
MONOCYTES NFR BLD: 0.79 K/UL (ref 0.1–0.95)
MONOCYTES NFR BLD: 18 % (ref 2–12)
NEUTROPHILS NFR BLD: 54 % (ref 43–80)
NEUTS SEG NFR BLD: 2.32 K/UL (ref 1.8–7.3)
PLATELET # BLD AUTO: 247 K/UL (ref 130–450)
PMV BLD AUTO: 10.9 FL (ref 7–12)
POTASSIUM SERPL-SCNC: 4 MMOL/L (ref 3.5–5)
PROT SERPL-MCNC: 6.1 G/DL (ref 6.4–8.3)
RBC # BLD AUTO: 3.88 M/UL (ref 3.8–5.8)
SERVICE CMNT-IMP: NORMAL
SERVICE CMNT-IMP: NORMAL
SODIUM SERPL-SCNC: 134 MMOL/L (ref 132–146)
SPECIMEN DESCRIPTION: NORMAL
SPECIMEN DESCRIPTION: NORMAL
WBC OTHER # BLD: 4.3 K/UL (ref 4.5–11.5)

## 2024-02-18 PROCEDURE — 85025 COMPLETE CBC W/AUTO DIFF WBC: CPT

## 2024-02-18 PROCEDURE — 6370000000 HC RX 637 (ALT 250 FOR IP): Performed by: INTERNAL MEDICINE

## 2024-02-18 PROCEDURE — 2500000003 HC RX 250 WO HCPCS: Performed by: NURSE PRACTITIONER

## 2024-02-18 PROCEDURE — 6360000002 HC RX W HCPCS: Performed by: NURSE PRACTITIONER

## 2024-02-18 PROCEDURE — 80053 COMPREHEN METABOLIC PANEL: CPT

## 2024-02-18 PROCEDURE — 36415 COLL VENOUS BLD VENIPUNCTURE: CPT

## 2024-02-18 PROCEDURE — 6370000000 HC RX 637 (ALT 250 FOR IP): Performed by: SURGERY

## 2024-02-18 PROCEDURE — 2580000003 HC RX 258: Performed by: INTERNAL MEDICINE

## 2024-02-18 PROCEDURE — 97530 THERAPEUTIC ACTIVITIES: CPT

## 2024-02-18 PROCEDURE — 2580000003 HC RX 258: Performed by: NURSE PRACTITIONER

## 2024-02-18 PROCEDURE — 6370000000 HC RX 637 (ALT 250 FOR IP)

## 2024-02-18 PROCEDURE — 83735 ASSAY OF MAGNESIUM: CPT

## 2024-02-18 PROCEDURE — 2060000000 HC ICU INTERMEDIATE R&B

## 2024-02-18 PROCEDURE — 97535 SELF CARE MNGMENT TRAINING: CPT

## 2024-02-18 RX ADMIN — DOXYCYCLINE 100 MG: 100 INJECTION, POWDER, LYOPHILIZED, FOR SOLUTION INTRAVENOUS at 09:26

## 2024-02-18 RX ADMIN — NITROFURANTOIN 100 MG: 100 CAPSULE ORAL at 09:31

## 2024-02-18 RX ADMIN — METRONIDAZOLE 500 MG: 500 INJECTION, SOLUTION INTRAVENOUS at 18:18

## 2024-02-18 RX ADMIN — SUCRALFATE 1 G: 1 TABLET ORAL at 14:03

## 2024-02-18 RX ADMIN — PANTOPRAZOLE SODIUM 40 MG: 40 TABLET, DELAYED RELEASE ORAL at 05:39

## 2024-02-18 RX ADMIN — CARBIDOPA AND LEVODOPA 2 TABLET: 25; 100 TABLET ORAL at 21:08

## 2024-02-18 RX ADMIN — SUCRALFATE 1 G: 1 TABLET ORAL at 21:08

## 2024-02-18 RX ADMIN — CARBIDOPA AND LEVODOPA 2 TABLET: 25; 100 TABLET ORAL at 09:31

## 2024-02-18 RX ADMIN — CARBIDOPA AND LEVODOPA 2 TABLET: 25; 100 TABLET ORAL at 14:03

## 2024-02-18 RX ADMIN — SODIUM CHLORIDE, PRESERVATIVE FREE 10 ML: 5 INJECTION INTRAVENOUS at 09:30

## 2024-02-18 RX ADMIN — ACETAMINOPHEN 650 MG: 325 TABLET ORAL at 18:19

## 2024-02-18 RX ADMIN — DOCUSATE SODIUM 100 MG: 100 CAPSULE, LIQUID FILLED ORAL at 09:31

## 2024-02-18 RX ADMIN — SUCRALFATE 1 G: 1 TABLET ORAL at 05:39

## 2024-02-18 RX ADMIN — WATER 1000 MG: 1 INJECTION INTRAMUSCULAR; INTRAVENOUS; SUBCUTANEOUS at 09:27

## 2024-02-18 RX ADMIN — DOXYCYCLINE 100 MG: 100 INJECTION, POWDER, LYOPHILIZED, FOR SOLUTION INTRAVENOUS at 21:10

## 2024-02-18 RX ADMIN — CARBIDOPA AND LEVODOPA 2 TABLET: 25; 100 TABLET ORAL at 18:19

## 2024-02-18 RX ADMIN — THIAMINE HYDROCHLORIDE 100 MG: 100 INJECTION, SOLUTION INTRAMUSCULAR; INTRAVENOUS at 09:28

## 2024-02-18 RX ADMIN — SODIUM CHLORIDE, PRESERVATIVE FREE 10 ML: 5 INJECTION INTRAVENOUS at 21:09

## 2024-02-18 RX ADMIN — METRONIDAZOLE 500 MG: 500 INJECTION, SOLUTION INTRAVENOUS at 09:25

## 2024-02-18 RX ADMIN — METRONIDAZOLE 500 MG: 500 INJECTION, SOLUTION INTRAVENOUS at 00:37

## 2024-02-18 RX ADMIN — SENNOSIDES 8.6 MG: 8.6 TABLET, FILM COATED ORAL at 21:08

## 2024-02-18 ASSESSMENT — PAIN DESCRIPTION - ORIENTATION: ORIENTATION: LEFT

## 2024-02-18 ASSESSMENT — PAIN - FUNCTIONAL ASSESSMENT: PAIN_FUNCTIONAL_ASSESSMENT: ACTIVITIES ARE NOT PREVENTED

## 2024-02-18 ASSESSMENT — PAIN DESCRIPTION - LOCATION: LOCATION: ARM

## 2024-02-18 ASSESSMENT — PAIN DESCRIPTION - DESCRIPTORS: DESCRIPTORS: PATIENT UNABLE TO DESCRIBE

## 2024-02-18 ASSESSMENT — PAIN SCALES - GENERAL: PAINLEVEL_OUTOF10: 0

## 2024-02-18 NOTE — PLAN OF CARE
Problem: Safety - Adult  Goal: Free from fall injury  Outcome: Progressing     Problem: Discharge Planning  Goal: Discharge to home or other facility with appropriate resources  Outcome: Progressing     Problem: Pain  Goal: Verbalizes/displays adequate comfort level or baseline comfort level  Outcome: Progressing     Problem: Skin/Tissue Integrity  Goal: Absence of new skin breakdown  Description: 1.  Monitor for areas of redness and/or skin breakdown  2.  Assess vascular access sites hourly  3.  Every 4-6 hours minimum:  Change oxygen saturation probe site  4.  Every 4-6 hours:  If on nasal continuous positive airway pressure, respiratory therapy assess nares and determine need for appliance change or resting period.  Outcome: Progressing     Problem: ABCDS Injury Assessment  Goal: Absence of physical injury  Outcome: Progressing     Problem: Nutrition Deficit:  Goal: Optimize nutritional status  Outcome: Progressing

## 2024-02-19 VITALS
OXYGEN SATURATION: 99 % | WEIGHT: 128.5 LBS | HEIGHT: 67 IN | SYSTOLIC BLOOD PRESSURE: 97 MMHG | DIASTOLIC BLOOD PRESSURE: 73 MMHG | TEMPERATURE: 97.7 F | RESPIRATION RATE: 18 BRPM | HEART RATE: 109 BPM | BODY MASS INDEX: 20.17 KG/M2

## 2024-02-19 LAB
ALBUMIN SERPL-MCNC: 3.4 G/DL (ref 3.5–5.2)
ALP SERPL-CCNC: 53 U/L (ref 40–129)
ALT SERPL-CCNC: 7 U/L (ref 0–40)
ANION GAP SERPL CALCULATED.3IONS-SCNC: 10 MMOL/L (ref 7–16)
AST SERPL-CCNC: 11 U/L (ref 0–39)
BASOPHILS # BLD: 0.03 K/UL (ref 0–0.2)
BASOPHILS NFR BLD: 1 % (ref 0–2)
BILIRUB SERPL-MCNC: <0.2 MG/DL (ref 0–1.2)
BUN SERPL-MCNC: 14 MG/DL (ref 6–23)
CALCIUM SERPL-MCNC: 8.4 MG/DL (ref 8.6–10.2)
CHLORIDE SERPL-SCNC: 104 MMOL/L (ref 98–107)
CO2 SERPL-SCNC: 27 MMOL/L (ref 22–29)
CREAT SERPL-MCNC: 0.8 MG/DL (ref 0.7–1.2)
EOSINOPHIL # BLD: 0.34 K/UL (ref 0.05–0.5)
EOSINOPHILS RELATIVE PERCENT: 6 % (ref 0–6)
ERYTHROCYTE [DISTWIDTH] IN BLOOD BY AUTOMATED COUNT: 12.3 % (ref 11.5–15)
GFR SERPL CREATININE-BSD FRML MDRD: >60 ML/MIN/1.73M2
GLUCOSE SERPL-MCNC: 92 MG/DL (ref 74–99)
HCT VFR BLD AUTO: 30.4 % (ref 37–54)
HGB BLD-MCNC: 10 G/DL (ref 12.5–16.5)
IMM GRANULOCYTES # BLD AUTO: 0.03 K/UL (ref 0–0.58)
IMM GRANULOCYTES NFR BLD: 1 % (ref 0–5)
LYMPHOCYTES NFR BLD: 1.05 K/UL (ref 1.5–4)
LYMPHOCYTES RELATIVE PERCENT: 19 % (ref 20–42)
MAGNESIUM SERPL-MCNC: 2 MG/DL (ref 1.6–2.6)
MCH RBC QN AUTO: 29.2 PG (ref 26–35)
MCHC RBC AUTO-ENTMCNC: 32.9 G/DL (ref 32–34.5)
MCV RBC AUTO: 88.9 FL (ref 80–99.9)
MONOCYTES NFR BLD: 0.7 K/UL (ref 0.1–0.95)
MONOCYTES NFR BLD: 13 % (ref 2–12)
NEUTROPHILS NFR BLD: 61 % (ref 43–80)
NEUTS SEG NFR BLD: 3.38 K/UL (ref 1.8–7.3)
PLATELET # BLD AUTO: 311 K/UL (ref 130–450)
PMV BLD AUTO: 10.4 FL (ref 7–12)
POTASSIUM SERPL-SCNC: 4 MMOL/L (ref 3.5–5)
PROT SERPL-MCNC: 5.4 G/DL (ref 6.4–8.3)
RBC # BLD AUTO: 3.42 M/UL (ref 3.8–5.8)
SODIUM SERPL-SCNC: 141 MMOL/L (ref 132–146)
WBC OTHER # BLD: 5.5 K/UL (ref 4.5–11.5)

## 2024-02-19 PROCEDURE — 6360000002 HC RX W HCPCS: Performed by: NURSE PRACTITIONER

## 2024-02-19 PROCEDURE — 80053 COMPREHEN METABOLIC PANEL: CPT

## 2024-02-19 PROCEDURE — 6370000000 HC RX 637 (ALT 250 FOR IP): Performed by: INTERNAL MEDICINE

## 2024-02-19 PROCEDURE — 2580000003 HC RX 258: Performed by: INTERNAL MEDICINE

## 2024-02-19 PROCEDURE — 2500000003 HC RX 250 WO HCPCS: Performed by: NURSE PRACTITIONER

## 2024-02-19 PROCEDURE — 6370000000 HC RX 637 (ALT 250 FOR IP)

## 2024-02-19 PROCEDURE — 83735 ASSAY OF MAGNESIUM: CPT

## 2024-02-19 PROCEDURE — 85025 COMPLETE CBC W/AUTO DIFF WBC: CPT

## 2024-02-19 PROCEDURE — 6370000000 HC RX 637 (ALT 250 FOR IP): Performed by: SURGERY

## 2024-02-19 PROCEDURE — 2580000003 HC RX 258: Performed by: NURSE PRACTITIONER

## 2024-02-19 PROCEDURE — 36415 COLL VENOUS BLD VENIPUNCTURE: CPT

## 2024-02-19 RX ORDER — PANTOPRAZOLE SODIUM 40 MG/1
40 TABLET, DELAYED RELEASE ORAL
Qty: 30 TABLET | Refills: 0 | Status: SHIPPED | OUTPATIENT
Start: 2024-02-20

## 2024-02-19 RX ORDER — SUCRALFATE 1 G/1
1 TABLET ORAL EVERY 8 HOURS SCHEDULED
Qty: 120 TABLET | Refills: 0 | Status: SHIPPED | OUTPATIENT
Start: 2024-02-19

## 2024-02-19 RX ORDER — POLYETHYLENE GLYCOL 3350 17 G/17G
17 POWDER, FOR SOLUTION ORAL DAILY PRN
Qty: 527 G | Refills: 1 | COMMUNITY
Start: 2024-02-19 | End: 2024-04-21

## 2024-02-19 RX ORDER — METRONIDAZOLE 500 MG/1
500 TABLET ORAL EVERY 8 HOURS SCHEDULED
Status: DISCONTINUED | OUTPATIENT
Start: 2024-02-19 | End: 2024-02-19 | Stop reason: HOSPADM

## 2024-02-19 RX ORDER — SENNOSIDES A AND B 8.6 MG/1
1 TABLET, FILM COATED ORAL NIGHTLY
Qty: 30 TABLET | Refills: 0 | Status: SHIPPED | OUTPATIENT
Start: 2024-02-19 | End: 2024-03-20

## 2024-02-19 RX ORDER — ASPIRIN 81 MG/1
81 TABLET ORAL 2 TIMES DAILY
Qty: 60 TABLET | Refills: 0 | COMMUNITY
Start: 2024-02-19

## 2024-02-19 RX ORDER — PSEUDOEPHEDRINE HCL 30 MG
100 TABLET ORAL DAILY
Qty: 30 CAPSULE | Refills: 0 | Status: SHIPPED | OUTPATIENT
Start: 2024-02-20

## 2024-02-19 RX ORDER — METRONIDAZOLE 500 MG/1
500 TABLET ORAL EVERY 8 HOURS SCHEDULED
Qty: 15 TABLET | Refills: 0 | Status: SHIPPED | OUTPATIENT
Start: 2024-02-19 | End: 2024-02-24

## 2024-02-19 RX ADMIN — PANTOPRAZOLE SODIUM 40 MG: 40 TABLET, DELAYED RELEASE ORAL at 05:46

## 2024-02-19 RX ADMIN — SUCRALFATE 1 G: 1 TABLET ORAL at 13:26

## 2024-02-19 RX ADMIN — NITROFURANTOIN 100 MG: 100 CAPSULE ORAL at 08:50

## 2024-02-19 RX ADMIN — CARBIDOPA AND LEVODOPA 2 TABLET: 25; 100 TABLET ORAL at 13:26

## 2024-02-19 RX ADMIN — SUCRALFATE 1 G: 1 TABLET ORAL at 05:46

## 2024-02-19 RX ADMIN — DOXYCYCLINE 100 MG: 100 INJECTION, POWDER, LYOPHILIZED, FOR SOLUTION INTRAVENOUS at 10:18

## 2024-02-19 RX ADMIN — METRONIDAZOLE 500 MG: 500 TABLET ORAL at 13:26

## 2024-02-19 RX ADMIN — METRONIDAZOLE 500 MG: 500 INJECTION, SOLUTION INTRAVENOUS at 08:55

## 2024-02-19 RX ADMIN — SODIUM CHLORIDE, PRESERVATIVE FREE 10 ML: 5 INJECTION INTRAVENOUS at 08:49

## 2024-02-19 RX ADMIN — CARBIDOPA AND LEVODOPA 2 TABLET: 25; 100 TABLET ORAL at 08:50

## 2024-02-19 RX ADMIN — METRONIDAZOLE 500 MG: 500 INJECTION, SOLUTION INTRAVENOUS at 00:39

## 2024-02-19 RX ADMIN — THIAMINE HYDROCHLORIDE 100 MG: 100 INJECTION, SOLUTION INTRAMUSCULAR; INTRAVENOUS at 08:48

## 2024-02-19 RX ADMIN — WATER 1000 MG: 1 INJECTION INTRAMUSCULAR; INTRAVENOUS; SUBCUTANEOUS at 08:48

## 2024-02-19 NOTE — CARE COORDINATION
2/19/24 1525 CM note: covid (-) 2/13/24, urine cx (+) 2/12/24, bl cx 2/13/24 (-). Room air. Telesitter. Discharge order noted. Po flagyl. Discharge plan is home with [facility] for therapy only. HHC order noted. Notified [facility] liaison, of pts discharge today. Pt resides with his wife and his daughter lives nearby. Emily and her daughter provide most of his care, and his sisters would come to sit with him sometimes. Pts DME includes a ww rollator, wc, and bed w/ HOB/FOB that elevates. Pts family will provide transportation home. Pts RN Inna and charge nurse notified his wife will be back around 4pm. IMM done. Electronically signed by Becki Flores RN on 2/19/2024 at 3:29 PM

## 2024-02-20 ENCOUNTER — CARE COORDINATION (OUTPATIENT)
Dept: CARE COORDINATION | Age: 74
End: 2024-02-20

## 2024-02-20 DIAGNOSIS — K52.9 ENTERITIS: Primary | ICD-10-CM

## 2024-02-20 PROCEDURE — 1111F DSCHRG MED/CURRENT MED MERGE: CPT | Performed by: FAMILY MEDICINE

## 2024-02-20 NOTE — CARE COORDINATION
Care Transitions Initial Follow Up Call    Call within 2 business days of discharge: Yes    Patient Current Location:  Home: [address] [place] [id number]    Care Transition Nurse contacted the spouse/partner by telephone to perform post hospital discharge assessment. Verified name and  with spouse/partner as identifiers. Provided introduction to self, and explanation of the Care Transition Nurse role.     Patient: Jaron Alvarado Patient : 1950   MRN: <[id number]>  Reason for Admission: 2024 - 2024 [facility] IP. Enteritis, UTI.  Discharge Date: 24 RARS: Readmission Risk Score: 17  NR  CT    [facility]  staff routed: Please contact spouse to schedule 1 wk hosp fu PCP. Spouse needed to review schedule at time of this call. 2024 - 2024 [facility] IP. Enteritis, UTI. Thank you.    [facility]   Had Hospice informative meeting. No sign on.    START taking:  docusate (COLACE, DULCOLAX)  Start taking on: 2024  metroNIDAZOLE (FLAGYL)  pantoprazole (PROTONIX)  Start taking on: 2024  polyethylene glycol (GLYCOLAX)  senna (SENOKOT)  sucralfate (CARAFATE)  STOP taking:  nitrofurantoin 100 MG capsule (MACRODANTIN)    Last Discharge Facility       Date Complaint Diagnosis Description Type Department Provider    24 Abdominal Pain; Hematemesis Nausea and vomiting, unspecified vomiting type ... ED to Hosp-Admission (Discharged) (ADMITTED) [facility] John Padgett DO; Hiram Rizzo MD            Was this an external facility discharge? No Discharge Facility: [facility]    Challenges to be reviewed by the provider   Additional needs identified to be addressed with provider: No  none               Method of communication with provider: none.    CTN spoke w/ spouse/Emily. Declined hospice or palliative care needs for pt. [facility] services.    Reports pt alert w/ occasional urinary incontinence

## 2024-02-20 NOTE — PROGRESS NOTES
OCCUPATIONAL THERAPY BEDSIDE TREATMENT NOTE  [place]  [address] [facility] [place]. [place]    Date:2024  Patient Name: Jaron Alvarado  MRN: [id number]  : 1950  Room: [address] [place] [address]       Evaluating OT: Aba Valladares OTR/L; #[id number]      Referring Provider and Specific Provider Orders/Date:      24   OT eval and treat  Start:  24,   End:  24,   ONE TIME,   Standing Count:  1 Occurrences,   R         Luis Daniel Chacko, APRN - CNP       Placement Recommendation: Subacute Rehab        Diagnosis:   1. Nausea and vomiting, unspecified vomiting type    2. Gastrointestinal hemorrhage, unspecified gastrointestinal hemorrhage type    3. Enteritis    4. Acute anemia         Surgery: None       Pertinent Medical History:       Past Medical History        Past Medical History:   Diagnosis Date    Parkinson's disease              Past Surgical History         Past Surgical History:   Procedure Laterality Date    ARM SURGERY Left 3/23/2023     LEFT HIP  IRRIGATION AND DEBRIDEMENT WITH POSSIBLE EXCHANGE OF FEMORAL HEAD COMPONENT performed by Semaj Dorantes DO at [facility] OR    DEEP BRAIN STIMULATOR PLACEMENT         L chest    FRACTURE SURGERY         nose    HIP SURGERY Left 2023     LEFT HIP HEMIARTHROPLASTY (DEPUY) performed by PAULETTE Sy DO at [facility] OR    JOINT REPLACEMENT Right 2017     knee    KNEE ARTHROSCOPY Right      TONSILLECTOMY        TOTAL KNEE ARTHROPLASTY Left 2020     TOTAL LEFT KNEE REPLACEMENT/ ARTHROPLASTY (JOLENE) performed by PAULETTE Sy DO at [facility] OR    UPPER GASTROINTESTINAL ENDOSCOPY N/A 2024     EGD BIOPSY performed by Hank Brennan MD at [facility] ENDOSCOPY            Precautions:  Up with assistance, falls, alarm, and UTI, N and V, confusion, Hx of Parkinson's, Deep Brain stimulator       Assessment of current deficits:     [x] Functional mobility            [x]ADLs           [x] 
  Palliative Care Department  [phone]  Palliative Care Progress Note  Provider YOLI Weinberg CNP     Jaron Alvarado  [id number]  Hospital Day: 4  Date of Initial Consult: 2/13/24  Referring Provider: Luis Daniel Chacko APRN - CNP   Palliative Medicine was consulted for assistance with: goals of care, CODE STATUS discussion    HPI:   Jaron Alvarado is a 73 y.o. with a medical history of Parkinson's disease who was admitted on 2/12/2024 from home with a CHIEF COMPLAINT of abdominal pain, N/V.  ED workup significant for: Imaging revealed possible ileus, lab work unremarkable.  Patient also noted to have coffee-ground emesis as well as melena.  Patient admitted, general surgery consulted.  Palliative medicine consulted for further assistance.    ASSESSMENT/PLAN:     Pertinent Hospital Diagnoses     Ileus  Hematemesis  Melena  History of Parkinson's disease    Palliative Care Encounter / Counseling Regarding Goals of Care  Please see detailed goals of care discussion as below  At this time, Jaron Alvarado, Does Not have capacity for medical decision-making.  Capacity is time limited and situation/question specific  During encounter Emily abdalla surrogate medical decision-maker  Outcome of goals of care meeting:   Continue current medical management  Code status Limited no chest compressions or intubation  Advanced Directives: POA and living will in [facility]  Surrogate/Legal NOK:  Emily Alvarado (POA/spouse): [phone]  Sho Velardealiza (1st alt/daughter): [phone]  Devi Alvarado (2nd alt/daughter): [phone]    Spiritual assessment: no spiritual distress identified  Bereavement and grief: to be determined  Referrals to: none  SUBJECTIVE:     Current medical issues leading to Palliative Medicine involvement include   Active Hospital Problems    Diagnosis Date Noted    Severe protein-calorie malnutrition (HCC) [E43] 02/14/2024    Enteritis [K52.9] 02/13/2024    Palliative care encounter [Z51.5] 
  Physician Progress Note      PATIENT:               DC BUCKLEY  CSN #:                  [id number]  :                       1950  ADMIT DATE:       2024 7:41 PM  DISCH DATE:        2024 4:58 PM  RESPONDING  PROVIDER #:        Ascencion Vazquez DO          QUERY TEXT:    Patient admitted with Necrotic esophagitis. Noted documentation of sepsis in   Progress note on 2/15 as \"Sepsis secondary to enteritis with E. coli urinary   tract infection\". In order to support the diagnosis of sepsis, please include   additional clinical indicators in your documentation.  Or please document if   the diagnosis of sepsis has been ruled out after further study    The medical record reflects the following:  Risk Factors: Advanced age with advanced Parkinson's disease and Anemia  Clinical Indicators: E.Coli UTI noted without Organ dysfunction, WBC 12 on   arrival now 7.5  Lactic acid 1.9  Procal 0.06, Afebrile Pulse remaining in the   80's Resp rate 18-20  Treatment: Rocephin, Doxycycline    Thank you,  Sarah Sherman BSN, RN, CRCR  Clinical Documentation Improvement  Options provided:  -- Sepsis present as evidenced by, Please document evidence.  -- Sepsis was ruled out after study  -- Other - I will add my own diagnosis  -- Disagree - Not applicable / Not valid  -- Disagree - Clinically unable to determine / Unknown  -- Refer to Clinical Documentation Reviewer    PROVIDER RESPONSE TEXT:    Sepsis was ruled out after study.    Query created by: Sarah Rojas on 2/15/2024 4:28 PM      Electronically signed by:  Ascencion Vazquez DO 2024 4:29 PM          
  Speech Language Pathology  NAME:  Jaron Alvarado  :  1950  DATE: 2024  ROOM:  0531/0531-01  SLP swallowing-dysphagia evaluation and treatment  ONE TIME     Complete  Discontinue    Question: Reason for SLP? Answer: Advanced Parkinson's disease and dementia, aspiration risk questionable  Pt unavailable at 1:40 pm  for Clinical Swallow Evaluation services due to:    HOLD per RN, Pt waiting for NG tube with CT contrast.  Will wait for surgeon to clear for clinical swallow eval.     Will re-attempt as appropriate. Thank you.     Danita Gonzalez MSCCC/SLP  Speech Language Pathologist  [phone]   
  Speech Language Pathology  NAME:  Jaron Alvarado  :  1950  DATE: 2024  ROOM:  [facility] POOL ROOM/NONE    Pt unavailable at 1549 for Clinical Swallow Evaluation services due to:    Off unit for testing/ procedure  . Patient off floor for EGD per documentation.     Will re-attempt as appropriate. Thank you.       Mela Pitt M.S., CCC-SLP  Speech-Language Pathologist  SP. [phone]      
4 Eyes Skin Assessment     NAME:  Jaron Alvarado  YOB: 1950  MEDICAL RECORD NUMBER:  [id number]    The patient is being assessed for  Admission    I agree that at least one RN has performed a thorough Head to Toe Skin Assessment on the patient. ALL assessment sites listed below have been assessed.      Areas assessed by both nurses:    Head, Face, Ears, Shoulders, Back, Chest, Arms, Elbows, Hands, Sacrum. Buttock, Coccyx, Ischium, Legs. Feet and Heels, and Under Medical Devices         Does the Patient have a Wound? No noted wound(s)       Rk Prevention initiated by RN: Yes  Wound Care Orders initiated by RN: No    Pressure Injury (Stage 3,4, Unstageable, DTI, NWPT, and Complex wounds) if present, place Wound referral order by RN under : No    New Ostomies, if present place, Ostomy referral order under : No     Nurse 1 eSignature: Electronically signed by Nara Maurice RN on 2/13/24 at 4:27 PM EST    **SHARE this note so that the co-signing nurse can place an eSignature**    Nurse 2 eSignature: {Esignature:[id number]}   
CLINICAL PHARMACY NOTE: MEDS TO BEDS    Total # of Prescriptions Filled: 5   The following medications were delivered to the patient:  Sucralfate 1 gm  Metronidazole 500 mg  Pantoprazole 40 mg  Senna-Time 8.6 mg  Docusate 100 mg    Additional Documentation:    
Comprehensive Nutrition Assessment    Type and Reason for Visit:  Initial, Positive Nutrition Screen (MST 2)    Nutrition Recommendations/Plan:   Continue NPO  Monitor nutrition progression and provide recommendations as indicated/medically appropriate      Malnutrition Assessment:  Malnutrition Status:  Severe malnutrition (d/t parkinsons) (02/14/24 1313)    Context:  Chronic Illness     Findings of the 6 clinical characteristics of malnutrition:  Energy Intake:  Mild decrease in energy intake (Comment) (prior to admission)  Weight Loss:  Mild weight loss (specify amount and time period) (-5% in 12 months)     Body Fat Loss:  Severe body fat loss Orbital, Triceps   Muscle Mass Loss:  Severe muscle mass loss Temples (temporalis), Clavicles (pectoralis & deltoids), Calf (gastrocnemius)  Fluid Accumulation:  No significant fluid accumulation     Strength:  Not Performed    Nutrition Assessment:    Pt admit for enteritis. Dx: Ileus, esophagus necrosis, biopsies obtained and pending. PMHx: Parkinson's w/ deep brain stimulator, arthritis. Poor po intake prior to admission d/t n/v/pain, coffee ground emesis. Pt NPO. Will continue to monitor and f/up per policy. Will provide Ensure when diet advances.    Nutrition Related Findings:    A/O x1, I/O - -620 since admit, abd distended, elevated BUN, elevated ALT, AST, Hgb 12.1, Hct 35.6, Chloride 108 Wound Type: None       Current Nutrition Intake & Therapies:    Average Meal Intake: NPO  Average Supplements Intake: NPO  Diet NPO Exceptions are: Sips of Water with Meds    Anthropometric Measures:  Height: 170.2 cm (5' 7.01\")  Ideal Body Weight (IBW): 148 lbs (67 kg)    Admission Body Weight: 58.3 kg (128 lb 8.5 oz)  Current Body Weight: 58.3 kg (128 lb 8.5 oz), 86.8 % IBW. Weight Source: Not Specified (02/13/24)  Current BMI (kg/m2): 20.1  Usual Body Weight: 61.7 kg (136 lb 0.4 oz) (03/22/23 bed scale)  % Weight Change (Calculated): -5.5             Estimated Daily Nutrient 
General Surgery Progress Note  [facility]  Jose Nicholas MD, MS    Patient's Name/Date of Birth: Jaron Alvarado / 1950    Date: February 14, 2024     Surgeon: MD Yu    Chief Complaint: abd distention, pain    Patient Active Problem List   Diagnosis    S/P knee replacement    Parkinson disease    Arthritis of left knee    Intertrochanteric fracture of left femur, closed, initial encounter ([facility])    Rupture of operation wound    S/P hip hemiarthroplasty    Parkinson's disease    Enteritis    Palliative care encounter       Subjective: no acute changes, NG removed prior to endo.. patient pulled? and not replaced, Nursing unable to replace, no vomiting, states \"no pain\" when asked    Objective:  /61   Pulse 87   Temp 98.7 °F (37.1 °C) (Infrared)   Resp 20   Ht 1.702 m (5' 7\")   Wt 58.3 kg (128 lb 8 oz)   SpO2 95%   BMI 20.13 kg/m²   Labs:  Recent Labs     02/12/24  2047 02/13/24  0325 02/13/24  0625 02/13/24  1021   WBC 12.0*  --  9.9  --    HGB 14.3 12.7 12.1* 13.0   HCT 42.8 37.7 35.6* 39.6     Lab Results   Component Value Date    CREATININE 0.8 02/13/2024    BUN 35 (H) 02/13/2024     02/13/2024    K 4.3 02/13/2024     (H) 02/13/2024    CO2 21 (L) 02/13/2024     Recent Labs     02/12/24 2047   LIPASE 36       General appearance:  NAD  Head: NCAT, PERRLA, EOMI, red conjunctiva  Neck: supple, no masses  Lungs: CTAB, equal chest rise bilateral  Heart: Reg rate  Abdomen: soft, nondistended, nontnder  Skin; no lesions  Gu: no cva tenderness  Extremities: extremities normal, atraumatic, no cyanosis or edema      Assessment/Plan:  Jaron Alvarado is a 73 y.o. male with constipation, ileus, erosive esophagitis    Replace NG, oral contrast CT given patients unreliable clinical exam and historian  Can remove NG after contrast and CT  May need PEG given decline and poor oral intake with esophagitis      Physician Signature: Electronically signed by  
General Surgery Progress Note  [facility]  Jose Nicholas MD, MS    Patient's Name/Date of Birth: Jaron Alvarado / 1950    Date: February 15, 2024     Surgeon: MD Yu    Chief Complaint: abd distention, pain    Patient Active Problem List   Diagnosis    S/P knee replacement    Parkinson disease    Arthritis of left knee    Intertrochanteric fracture of left femur, closed, initial encounter (HCC)    Rupture of operation wound    S/P hip hemiarthroplasty    Parkinson's disease    Enteritis    Palliative care encounter    Severe protein-calorie malnutrition (HCC)       Subjective: Much more lucid this morning, answering questions. He denies abdominal pain, nausea, emesis. He is unsure when he had his last bowel movement    Objective:  BP (!) 143/94   Pulse 77   Temp 98.2 °F (36.8 °C) (Axillary)   Resp 18   Ht 1.702 m (5' 7.01\")   Wt 58.3 kg (128 lb 8 oz)   SpO2 99%   BMI 20.12 kg/m²   Labs:  Recent Labs     02/13/24  0625 02/13/24  1021 02/14/24  0951 02/15/24  0852   WBC 9.9  --  6.9 7.5   HGB 12.1* 13.0 9.7* 9.6*   HCT 35.6* 39.6 29.7* 27.8*       Lab Results   Component Value Date    CREATININE 0.7 02/14/2024    BUN 20 02/14/2024     02/14/2024    K 3.8 02/14/2024     (H) 02/14/2024    CO2 25 02/14/2024     Recent Labs     02/12/24  2047   LIPASE 36         General appearance:  NAD  Head: NCAT, PERRLA, EOMI, red conjunctiva  Neck: supple, no masses  Lungs: CTAB, equal chest rise bilateral  Heart: Reg rate  Abdomen: soft, nondistended, nontnder  Skin; no lesions  Gu: no cva tenderness  Extremities: extremities normal, atraumatic, no cyanosis or edema      Assessment/Plan:  Jaron Alvarado is a 73 y.o. male with constipation, ileus, erosive esophagitis    - Seems awake enough to be able to drink oral contrast. CT abd/pelv w/ PO contrast.       Electronically signed by Rosita Wilson MD on 2/15/2024 at 9:50 AM    General Surgery Progress Note  [facility] 
General Surgery Progress Note  [facility]  Jose Nicholas MD, MS    Patient's Name/Date of Birth: Jaron Alvarado / 1950    Date: February 16, 2024     Surgeon: MD Yu    Chief Complaint: PSBO    Patient Active Problem List   Diagnosis    S/P knee replacement    Parkinson disease    Arthritis of left knee    Intertrochanteric fracture of left femur, closed, initial encounter (HCC)    Rupture of operation wound    S/P hip hemiarthroplasty    Parkinson's disease    Enteritis    Palliative care encounter    Severe protein-calorie malnutrition (HCC)       Subjective: doing well, tolerated PO, denies BM    Objective:  BP (!) 129/90   Pulse 71   Temp 98.1 °F (36.7 °C) (Oral)   Resp 18   Ht 1.702 m (5' 7.01\")   Wt 58.3 kg (128 lb 8 oz)   SpO2 98%   BMI 20.12 kg/m²   Labs:  Recent Labs     02/14/24  0951 02/15/24  0852 02/16/24  0535   WBC 6.9 7.5 6.2   HGB 9.7* 9.6* 9.6*   HCT 29.7* 27.8* 27.7*     Lab Results   Component Value Date    CREATININE 0.6 (L) 02/16/2024    BUN 6 02/16/2024     02/16/2024    K 3.4 (L) 02/16/2024     02/16/2024    CO2 24 02/16/2024     No results for input(s): \"LIPASE\", \"AMYLASE\" in the last 72 hours.      General appearance:  NAD  Head: NCAT, PERRLA, EOMI, red conjunctiva  Neck: supple, no masses  Lungs: CTAB, equal chest rise bilateral  Heart: Reg rate  Abdomen: soft, nondistended, nontender  Skin; no lesions  Gu: no cva tenderness  Extremities: extremities normal, atraumatic, no cyanosis or edema      Assessment/Plan:  Jaron Alvarado is a 73 y.o. male with esophagitis, PSBO resolved    Fulls adv as tolerated  Suppository and enema today  No further surgical plans  DC when ok with others    Physician Signature: Electronically signed by Dr. Nicholas  [phone] (p)  2/16/2024  6:45 AM    
[facility]    Met patient at bedside. No family members present. Updates received form nursing. Call placed to patient's wife Emily. Josselin confirms she is not ready for Hospice services for patient. Josselin wants to continue with curative therapies and treatments to see how patient progresses. Josselin plans to take patient back to their home setting. She plans to give him some adjustment time at home before she would consider Hospice care. Josselin will call [facility] for any future needs. [facility] will sign off. Please re consult if need arises. Will provide updates to nursing and CM/SW.     Electronically signed by Mackenzie Jackson RN on 2/14/2024 at 10:46 AM  [phone]: [phone]   
[facility]     Liaison Information Visit Note              Patient Name: Jaron Alvarado    Code Status Order: Limited   Allergies:  Seasonal  Meeting held with Wife Josselin    The hospice benefit and philosophy were explained including that hospice is end of life care in which, per Medicare, a patient has a terminal diagnosis that life expectancy would be 6 months or less.  Hospice care is a service that is covered by most insurance plans.  Explained hospice services at home, at On license of [facility] with room/board private pay unless patient has Medicaid and the Hospice House.  Explained that once in hospice care, all aggressive treatments would be stopped and allow nature to takes its course with focus on comfort care for the patient.    Information only consult provided. All questions answered regarding hospice services. Wife appreciative of information and declined Hospice services at this time. Wife requested follow up for questions only tomorrow on 2-14-24. [facility] to continue to follow.      Electronically signed by Mackenzie Jackson RN on 2/13/2024 at 12:11 PM   
Internal Medicine Progress Note    [facility]    Ascencion Vazquez D.O., FRANNIEI.                    John Vasquez D.O., VIVI Thompson D.O.    Vilma العلي, MSN, APRN, NP-C  Luis Daniel Chacko, MSN, APRN-CNP  Luke Schwab, MSN, APRN, NP-C     Primary Care Physician: Steve Ga DO   Admitting Physician:  John Vasquez DO  Admission date and time: 2/12/2024  7:41 PM    Room:  [address] [place] [address]  Admitting diagnosis: Enteritis [K52.9]  Gastrointestinal hemorrhage, unspecified gastrointestinal hemorrhage type [K92.2]  Nausea and vomiting, unspecified vomiting type [R11.2]    Patient Name: Jaron Alvarado  MRN: [id number]    Date of Service: 2/16/2024     Subjective:  Jaron is a 73 y.o. male who was seen and examined today,2/16/2024, at the bedside.  Patient seem to be improving today.  Discussed the results of the CAT scan which does show improvement.  Patient is currently tolerating a full liquid diet and will be progressed accordingly.  Denies chest pain or shortness of breath    Wife present at the bedside      ROS: 12 point review of symptoms was attempted unsuccessfully as above      Physical Exam:  I/O this shift:  In: -   Out: 2025 [Urine:2025]    Intake/Output Summary (Last 24 hours) at 2/16/2024 1713  Last data filed at 2/16/2024 1547  Gross per 24 hour   Intake --   Output 2025 ml   Net -2025 ml   I/O last 3 completed shifts:  In: -   Out: 2600 [Urine:2600]  Patient Vitals for the past 96 hrs (Last 3 readings):   Weight   02/13/24 0830 58.3 kg (128 lb 8 oz)   02/13/24 0115 38.1 kg (83 lb 15.9 oz)     Vital Signs:   Blood pressure (!) 156/82, pulse 77, temperature 97.6 °F (36.4 °C), temperature source Oral, resp. rate 16, height 1.702 m (5' 7.01\"), weight 58.3 kg (128 lb 8 oz), SpO2 97 %.    General appearance:  Awake and alert spontaneously.  Unable to cooperate subjectively due to neurologic disorder.  Acute on chronic ill appearance 
Internal Medicine Progress Note    [facility]    Ascencion Vazquez D.O., VIVI Vasquez D.O., VIVI Thompson D.O.    Vilma العلي, MSN, APRN, NP-C  Luis Daniel Chacko, MSN, APRN-CNP  Luke Schwab, MSN, APRN, NP-C     Primary Care Physician: Steve Ga DO   Admitting Physician:  John Vasquez DO  Admission date and time: 2/12/2024  7:41 PM    Room:  [address] [facility] [place] [address]  Admitting diagnosis: Enteritis [K52.9]  Gastrointestinal hemorrhage, unspecified gastrointestinal hemorrhage type [K92.2]  Nausea and vomiting, unspecified vomiting type [R11.2]    Patient Name: Jaron Alvarado  MRN: [id number]    Date of Service: 2/18/2024     Subjective:  Jaron is a 73 y.o. male who was seen and examined today,2/18/2024, at the bedside.  Patient seem to be improving today.  He is more alert today with his daughter and wife at the bedside.  He is picky about what he eats but he is tolerating regular food.  He is good enough to be discharged home but the wife would like him to stay 1 more day.    ROS: 12 point review of symptoms was attempted unsuccessfully as above      Physical Exam:  I/O this shift:  In: -   Out: 700 [Urine:700]    Intake/Output Summary (Last 24 hours) at 2/18/2024 1732  Last data filed at 2/18/2024 1613  Gross per 24 hour   Intake --   Output 2300 ml   Net -2300 ml     I/O last 3 completed shifts:  In: -   Out: 4600 [Urine:4600]  No data found.    Vital Signs:   Blood pressure 133/79, pulse 91, temperature 99.2 °F (37.3 °C), temperature source Infrared, resp. rate 18, height 1.702 m (5' 7.01\"), weight 58.3 kg (128 lb 8 oz), SpO2 99 %.    General appearance:  Awake and alert self only unable to cooperate subjectively due to neurologic disorder.  Acute on chronic ill appearance without distress.  Head:  Normocephalic. No masses, lesions or tenderness.  Eyes:  PERRLA.  EOMI.  Sclera clear.    ENT:  Ears normal. Mucosa 
Internal Medicine Progress Note    [facility]    Ascencion Vazquez D.O., VIVI Vasquez D.O., VIVI Thompson D.O.    Vilma العلي, MSN, APRN, NP-C  Luis Daniel Chacko, MSN, APRN-CNP  Luke Schwab, MSN, APRN, NP-C     Primary Care Physician: Steve Ga DO   Admitting Physician:  John Vasquez DO  Admission date and time: 2/12/2024  7:41 PM    Room:  [address] [place] [address]  Admitting diagnosis: Enteritis [K52.9]  Gastrointestinal hemorrhage, unspecified gastrointestinal hemorrhage type [K92.2]  Nausea and vomiting, unspecified vomiting type [R11.2]    Patient Name: Jaron Alvarado  MRN: [id number]    Date of Service: 2/17/2024     Subjective:  Jaron is a 73 y.o. male who was seen and examined today,2/17/2024, at the bedside.  Patient seem to be improving today.  He is more alert today with his sister at the bedside.  He is oriented to self and able to follow some simple directions.  He has been advanced to regular diet which she is tolerating.  Wife present at the bedside      ROS: 12 point review of symptoms was attempted unsuccessfully as above      Physical Exam:  I/O this shift:  In: -   Out: 2250 [Urine:2250]    Intake/Output Summary (Last 24 hours) at 2/17/2024 1530  Last data filed at 2/17/2024 1450  Gross per 24 hour   Intake --   Output 3975 ml   Net -3975 ml     I/O last 3 completed shifts:  In: -   Out: 2775 [Urine:2775]  No data found.    Vital Signs:   Blood pressure 139/88, pulse 77, temperature 97.5 °F (36.4 °C), temperature source Infrared, resp. rate 18, height 1.702 m (5' 7.01\"), weight 58.3 kg (128 lb 8 oz), SpO2 100 %.    General appearance:  Awake and alert self only unable to cooperate subjectively due to neurologic disorder.  Acute on chronic ill appearance without distress.  Head:  Normocephalic. No masses, lesions or tenderness.  Eyes:  PERRLA.  EOMI.  Sclera clear.    ENT:  Ears normal. Mucosa 
NGT removed in endoscopy. Dr. Nicholas wants NGT replaced.   
PROGRESS NOTE  By Lit Tamayo M.D.    [facility]  Dr. Neo Fowler M.D.,  Dr. Juan Carlos Escobar M.D.,   CRISTOFER DaleyO.,  Dr. Ming Elliott M.D.,  Dr. Jaxon Michaud D.O.,          Jaron ARECHIGA Jenny  73 y.o.  male    SUBJECTIVE:  No new complaints.  Denies abdominal pain.  No family at bedside    OBJECTIVE:    /86   Pulse 78   Temp 98 °F (36.7 °C) (Infrared)   Resp 18   Ht 1.702 m (5' 7.01\")   Wt 58.3 kg (128 lb 8 oz)   SpO2 99%   BMI 20.12 kg/m²     General: NAD/older adult  male  HEENT: Anicteric sclera/posterior mucosa  Neck: Supple with trachea midline  Chest: CTAB/symmetric excursions  Cor: Regular  Abd.: Soft.  Appears nontender and nondistended  Extr.:  No peripheral edema.  Decreased muscle tone and bulk throughout  Skin: Warm and dry      DATA:    Monitor data reviewed -sinus rhythm noted.       Lab Results   Component Value Date/Time    WBC 3.8 02/17/2024 04:25 PM    RBC 3.62 02/17/2024 04:25 PM    HGB 10.9 02/17/2024 04:25 PM    HCT 31.6 02/17/2024 04:25 PM    MCV 87.3 02/17/2024 04:25 PM    MCH 30.1 02/17/2024 04:25 PM    MCHC 34.5 02/17/2024 04:25 PM    RDW 11.9 02/17/2024 04:25 PM     02/17/2024 04:25 PM    MPV 10.1 02/17/2024 04:25 PM     Lab Results   Component Value Date/Time     02/17/2024 04:25 PM    K 3.2 02/17/2024 04:25 PM    K 4.1 03/24/2023 04:24 AM     02/17/2024 04:25 PM    CO2 27 02/17/2024 04:25 PM    BUN 6 02/17/2024 04:25 PM    CREATININE 0.7 02/17/2024 04:25 PM    CALCIUM 8.5 02/17/2024 04:25 PM    PROT 5.8 02/17/2024 04:25 PM    LABALBU 3.5 02/17/2024 04:25 PM    BILITOT 0.2 02/17/2024 04:25 PM    ALKPHOS 50 02/17/2024 04:25 PM    AST 12 02/17/2024 04:25 PM    ALT 11 02/17/2024 04:25 PM     Lab Results   Component Value Date/Time    LIPASE 36 02/12/2024 08:47 PM     No results found for: \"AMYLASE\"      ASSESSMENT/PLAN:  Patient Active Problem List   Diagnosis    S/P knee replacement    Parkinson disease    Arthritis of 
PROGRESS NOTE  By Lit Tamayo M.D.    [facility]  Dr. Neo Fowler M.D.,  Dr. Juan Carlos Escobar M.D.,   Dr. Fili Mckeon D.O.,  Dr. Ming Elliott M.D.,  CRSITOFER AguirreO.,          Jaron Beltranson  73 y.o.  male    SUBJECTIVE:  Denies abdominal pain.  Denies nausea vomiting.  No family at bedside    OBJECTIVE:    BP (!) 148/89   Pulse 74   Temp 97.7 °F (36.5 °C) (Oral)   Resp 17   Ht 1.702 m (5' 7.01\")   Wt 58.3 kg (128 lb 8 oz)   SpO2 96%   BMI 20.12 kg/m²     General: NAD/ male.   HEENT: Moist oral mucosa/anicteric sclerae  Neck: Supple/trachea midline  Chest: CTAB  Cor: Regular/S1-S2  Abd.: Soft and not distended.  Appears nontender.  BS +  Extr.:  No significant peripheral edema.  Decreased muscle tone and bulk throughout  Skin: Warm and dry/anicteric      DATA:    Monitor data reviewed -sinus rhythm noted.       Lab Results   Component Value Date/Time    WBC 6.2 02/16/2024 05:35 AM    RBC 3.24 02/16/2024 05:35 AM    HGB 9.6 02/16/2024 05:35 AM    HCT 27.7 02/16/2024 05:35 AM    MCV 85.5 02/16/2024 05:35 AM    MCH 29.6 02/16/2024 05:35 AM    MCHC 34.7 02/16/2024 05:35 AM    RDW 11.9 02/16/2024 05:35 AM     02/16/2024 05:35 AM    MPV 9.7 02/16/2024 05:35 AM     Lab Results   Component Value Date/Time     02/16/2024 05:35 AM    K 3.4 02/16/2024 05:35 AM    K 4.1 03/24/2023 04:24 AM     02/16/2024 05:35 AM    CO2 24 02/16/2024 05:35 AM    BUN 6 02/16/2024 05:35 AM    CREATININE 0.6 02/16/2024 05:35 AM    CALCIUM 8.0 02/16/2024 05:35 AM    PROT 5.2 02/16/2024 05:35 AM    LABALBU 3.3 02/16/2024 05:35 AM    BILITOT 0.4 02/16/2024 05:35 AM    ALKPHOS 48 02/16/2024 05:35 AM    AST 16 02/16/2024 05:35 AM    ALT 8 02/16/2024 05:35 AM     Lab Results   Component Value Date/Time    LIPASE 36 02/12/2024 08:47 PM     No results found for: \"AMYLASE\"      ASSESSMENT/PLAN:  Patient Active Problem List   Diagnosis    S/P knee replacement    Parkinson disease    
PROGRESS NOTE  By Lit Tamayo M.D.    [facility]  Dr. Neo Fowler M.D.,  Dr. Juan Carlos Escobar M.D.,   Dr. Fili Mckeon D.O.,  Dr. Ming Elliott M.D.,  Dr. Jaxon Michaud D.O.,          Jaron Alvarado  73 y.o.  male    SUBJECTIVE:  Denies abdominal pain.  Denies nausea vomiting.  No family at bedside    OBJECTIVE:    /67   Pulse 79   Temp 97.9 °F (36.6 °C) (Axillary)   Resp 20   Ht 1.702 m (5' 7\")   Wt 58.3 kg (128 lb 8 oz)   SpO2 94%   BMI 20.13 kg/m²     General: NAD/older adult  male  HEENT: Anicteric sclera/moist oral mucosa  Neck: Supple/trachea is midline/no JVD  Chest: Symmetric excursion/nonlabored respirations  Cor: Regular.  Pacemaker noted left anterior chest  Abd.: Soft.  Nontender and nondistended  Extr.:  No significant peripheral edema.  Decreased muscle tone and bulk, consistent with aging condition  Skin: Warm and dry/anicteric      DATA:    Monitor data reviewed -paced rhythm noted.       Lab Results   Component Value Date/Time    WBC 9.9 02/13/2024 06:25 AM    RBC 4.03 02/13/2024 06:25 AM    HGB 13.0 02/13/2024 10:21 AM    HCT 39.6 02/13/2024 10:21 AM    MCV 88.3 02/13/2024 06:25 AM    MCH 30.0 02/13/2024 06:25 AM    MCHC 34.0 02/13/2024 06:25 AM    RDW 12.2 02/13/2024 06:25 AM     02/13/2024 06:25 AM    MPV 9.9 02/13/2024 06:25 AM     Lab Results   Component Value Date/Time     02/13/2024 06:25 AM    K 4.3 02/13/2024 06:25 AM    K 4.1 03/24/2023 04:24 AM     02/13/2024 06:25 AM    CO2 21 02/13/2024 06:25 AM    BUN 35 02/13/2024 06:25 AM    CREATININE 0.8 02/13/2024 06:25 AM    CALCIUM 8.5 02/13/2024 06:25 AM    PROT 6.2 02/13/2024 06:25 AM    LABALBU 3.7 02/13/2024 06:25 AM    BILITOT 0.4 02/13/2024 06:25 AM    ALKPHOS 55 02/13/2024 06:25 AM    AST 53 02/13/2024 06:25 AM    ALT 45 02/13/2024 06:25 AM     Lab Results   Component Value Date/Time    LIPASE 36 02/12/2024 08:47 PM     No results found for: 
PROGRESS NOTE  By Lit Tamayo M.D.    [facility]  Dr. Neo Fowler M.D.,  Dr. Juan Carlos Escobar M.D.,   HAIM Daley.O.,  Dr. Ming Elliott M.D.,  Dr. Jaxon Michaud D.O.,          Jaron ARECHIGA Jenny  73 y.o.  male    SUBJECTIVE:  Denies abdominal pain.  Denies nausea or vomiting    OBJECTIVE:    BP (!) 143/94   Pulse 77   Temp 98.2 °F (36.8 °C) (Axillary)   Resp 18   Ht 1.702 m (5' 7.01\")   Wt 58.3 kg (128 lb 8 oz)   SpO2 99%   BMI 20.12 kg/m²     General: NAD/older adult  male  HEENT: Anicteric sclera/moist oral mucosa  Neck: Supple/trachea is midline/no JVD  Chest: CTAB/symmetric excursions  Cor: Regular  Abd.: Soft/nontender.  BS +/4  Extr.:  No significant peripheral edema  Skin: Warm and dry/anicteric       DATA:    Monitor data reviewed -paced rhythm noted.       Lab Results   Component Value Date/Time    WBC 6.9 02/14/2024 09:51 AM    RBC 3.25 02/14/2024 09:51 AM    HGB 9.7 02/14/2024 09:51 AM    HCT 29.7 02/14/2024 09:51 AM    MCV 91.4 02/14/2024 09:51 AM    MCH 29.8 02/14/2024 09:51 AM    MCHC 32.7 02/14/2024 09:51 AM    RDW 12.4 02/14/2024 09:51 AM     02/14/2024 09:51 AM    MPV 10.3 02/14/2024 09:51 AM     Lab Results   Component Value Date/Time     02/14/2024 09:51 AM    K 3.8 02/14/2024 09:51 AM    K 4.1 03/24/2023 04:24 AM     02/14/2024 09:51 AM    CO2 25 02/14/2024 09:51 AM    BUN 20 02/14/2024 09:51 AM    CREATININE 0.7 02/14/2024 09:51 AM    CALCIUM 8.3 02/14/2024 09:51 AM    PROT 5.2 02/14/2024 09:51 AM    LABALBU 3.2 02/14/2024 09:51 AM    BILITOT 0.3 02/14/2024 09:51 AM    ALKPHOS 45 02/14/2024 09:51 AM    AST 26 02/14/2024 09:51 AM    ALT 52 02/14/2024 09:51 AM     Lab Results   Component Value Date/Time    LIPASE 36 02/12/2024 08:47 PM     No results found for: \"AMYLASE\"      ASSESSMENT/PLAN:  Patient Active Problem List   Diagnosis    S/P knee replacement    Parkinson disease    Arthritis of left knee    Intertrochanteric fracture of 
PROGRESS NOTE  By Lit Tamayo M.D.    [facility]  Dr. Neo Fowler M.D.,  Dr. Juan Carlos Escobar M.D.,   HAIM Daley.O.,  Dr. Ming Elliott M.D.,  Dr. Jaxon Michaud D.O.,          Jaron Beltranson  73 y.o.  male    SUBJECTIVE:  Denies abdominal pain.      OBJECTIVE:    /64   Pulse 71   Temp 97.3 °F (36.3 °C) (Axillary)   Resp 18   Ht 1.702 m (5' 7.01\")   Wt 58.3 kg (128 lb 8 oz)   SpO2 100%   BMI 20.12 kg/m²     General: Older adult  male.  NAD.    HEENT: Anicteric sclera  Neck:Supple; trachea midline  Chest: Symmetric excursion/CTAB  Cor: Regular/S1-S2  Abd.: Soft/appears nontender.  BS +  Extr.:  Decreased muscle tone and bulk throughout  Skin: Warm and dry/anicteric      DATA:    Monitor data reviewed -sinus rhythm noted.       Lab Results   Component Value Date/Time    WBC 5.5 02/19/2024 06:50 AM    RBC 3.42 02/19/2024 06:50 AM    HGB 10.0 02/19/2024 06:50 AM    HCT 30.4 02/19/2024 06:50 AM    MCV 88.9 02/19/2024 06:50 AM    MCH 29.2 02/19/2024 06:50 AM    MCHC 32.9 02/19/2024 06:50 AM    RDW 12.3 02/19/2024 06:50 AM     02/19/2024 06:50 AM    MPV 10.4 02/19/2024 06:50 AM     Lab Results   Component Value Date/Time     02/19/2024 06:50 AM    K 4.0 02/19/2024 06:50 AM    K 4.1 03/24/2023 04:24 AM     02/19/2024 06:50 AM    CO2 27 02/19/2024 06:50 AM    BUN 14 02/19/2024 06:50 AM    CREATININE 0.8 02/19/2024 06:50 AM    CALCIUM 8.4 02/19/2024 06:50 AM    PROT 5.4 02/19/2024 06:50 AM    LABALBU 3.4 02/19/2024 06:50 AM    BILITOT <0.2 02/19/2024 06:50 AM    ALKPHOS 53 02/19/2024 06:50 AM    AST 11 02/19/2024 06:50 AM    ALT 7 02/19/2024 06:50 AM     Lab Results   Component Value Date/Time    LIPASE 36 02/12/2024 08:47 PM     No results found for: \"AMYLASE\"      ASSESSMENT/PLAN:  Patient Active Problem List   Diagnosis    S/P knee replacement    Parkinson disease    Arthritis of left knee    Intertrochanteric fracture of left femur, closed, initial 
Physical Therapy  Physical Therapy Initial Evaluation/Plan of Care    Room #:  ENDO POOL ROOM/NONE  Patient Name: Jaron Alvarado  YOB: 1950  MRN: [id number]    Date of Service: 2/13/2024     Tentative placement recommendation: Subacute Rehab  Equipment recommendation: To be determined      Evaluating Physical Therapist: Ranjeet Barros, PT, DPT #[id number]      Specific Provider Orders/Date/Referring Provider :     02/13/24 0600    PT eval and treat  Start:  02/13/24 0600,   End:  02/13/24 0600,   ONE TIME,   Standing Count:  1 Occurrences,   R       Luis Daniel Chacko, APRN - CNP Acknowledge New    Admitting Diagnosis:   Enteritis [K52.9]  Gastrointestinal hemorrhage, unspecified gastrointestinal hemorrhage type [K92.2]  Nausea and vomiting, unspecified vomiting type [R11.2]      Surgery: none  Visit Diagnoses         Codes    Nausea and vomiting, unspecified vomiting type    -  Primary R11.2    Gastrointestinal hemorrhage, unspecified gastrointestinal hemorrhage type     K92.2    Acute anemia     D64.9            Patient Active Problem List   Diagnosis    S/P knee replacement    Parkinson disease    Arthritis of left knee    Intertrochanteric fracture of left femur, closed, initial encounter ([facility])    Rupture of operation wound    S/P hip hemiarthroplasty    Parkinson's disease    Enteritis    Palliative care encounter        ASSESSMENT of Current Deficits Patient exhibits decreased strength, balance, and endurance impairing functional mobility, transfers, gait , gait distance, and tolerance to activity. Pt very confused and would need redirecting to stop pulling at his NG tube during session and was not safe to attempt standing when sitting EOB d/t difficulty following VC and safety concerns.        PHYSICAL THERAPY  PLAN OF CARE       Physical therapy plan of care is established based on physician order,  patient diagnosis and clinical assessment    Current Treatment Recommendations:    -Bed Mobility: 
Physical Therapy  Physical Therapy Treatment Note/Plan of Care    Room #:  0531/0531-01  Patient Name: Jaron Alvarado  YOB: 1950  MRN: [id number]    Date of Service: 2/16/2024     Tentative placement recommendation: Subacute Rehab  Equipment recommendation: To be determined      Evaluating Physical Therapist: Ranjeet Barros, PT, DPT #[id number]      Specific Provider Orders/Date/Referring Provider :     02/13/24 0600    PT eval and treat  Start:  02/13/24 0600,   End:  02/13/24 0600,   ONE TIME,   Standing Count:  1 Occurrences,   R       Luis Daniel Chacko, APRN - CNP Acknowledge New    Admitting Diagnosis:   Enteritis [K52.9]  Gastrointestinal hemorrhage, unspecified gastrointestinal hemorrhage type [K92.2]  Nausea and vomiting, unspecified vomiting type [R11.2]      Surgery: none  Visit Diagnoses         Codes    Nausea and vomiting, unspecified vomiting type    -  Primary R11.2    Gastrointestinal hemorrhage, unspecified gastrointestinal hemorrhage type     K92.2    Acute anemia     D64.9            Patient Active Problem List   Diagnosis    S/P knee replacement    Parkinson disease    Arthritis of left knee    Intertrochanteric fracture of left femur, closed, initial encounter (HCC)    Rupture of operation wound    S/P hip hemiarthroplasty    Parkinson's disease    Enteritis    Palliative care encounter    Severe protein-calorie malnutrition (HCC)        ASSESSMENT of Current Deficits Patient exhibits decreased strength, balance, and endurance impairing functional mobility, transfers, gait , gait distance, and tolerance to activity. Pt very confused, and would speak gibberish at times. Patient able to sit at edge of bed this date with assistance of 2 for safety and barley following one step commands. Patient with posterior lean requiring maximal assist progressing to minimal assist intermittently. Able to stand with assistance of 2 however patient with posterior lean d/t feet escaping out in front of 
Pt arrived to endo without  NG in place. Was told by Transporter that \"transport was delayed because nurse removed NG tube prior to transport to endoscopy because it was falling out\"  
SPEECH LANGUAGE PATHOLOGY  DAILY PROGRESS NOTE      PATIENT NAME:  Jaron Alvarado      :  1950          TODAY'S DATE:  2024 ROOM:  [facility]/0531-01    Current Diet: ADULT DIET; Full Liquid  ADULT ORAL NUTRITION SUPPLEMENT; Breakfast, Lunch, Dinner; Standard High Calorie/High Protein Oral Supplement    Patient seen for dysphagia therapy wants to feed self but has difficulty feeding self in bed.  Diet advance to full liquids.  He tolerated this well.  Does get full very quickly needs encouraged to increase intake.     Recommendation: will continue to work with patient until advanced to solids if no dysphagia with solids will sign off      CPT code(s) 86701  dysphagia tx  Total minutes :  15 minutes    Danita Gonzalez MSCCC/SLP  Speech Language Pathologist  [phone]     
Sbar completed and placed on chart. Chart with patient in transit.   
Arthritis of left knee    Intertrochanteric fracture of left femur, closed, initial encounter (HCC)    Rupture of operation wound    S/P hip hemiarthroplasty    Parkinson's disease    Enteritis    Palliative care encounter    Severe protein-calorie malnutrition (HCC)     1.  GI bleed/anemia  -Abnormal CT scan  -Decreased H&H without evidence of overt bleed  -No iron deficiency  -Normocytic/no significant iron deficiency  -EGD 12/13/2024 revealing necrotic mucosa in the lower two thirds of the esophagus -biopsies obtained and pending  -Continue IV PPI  --Monitor H&H every 12 to 24 hours  -Keep on hold 2 units PRBC  -Defer transfusion per admitting  -No immediate plan for inpatient colonoscopy     2.  Ileus  -CT 2/15/2024 with improving findings  -General surgery input appreciated     Lit Tamayo MD  2/17/2024  8:07 AM    NOTE:  This report was transcribed using voice recognition software.  Every effort was made to ensure accuracy; however, inadvertent computerized transcription errors may be present.   
exam due to GI workup in process     Specific dysphagia interventions to include:     compensatory swallowing strategies to improve airway protection and swallow function.  Training in positioning for improved integrity of swallow  ongoing mealtime assessment to provide diet modification and compensatory strategy implementation to minimize risk of aspiration associated with PO intake    Specific instructions for next treatment:  ongoing PO analysis to upgrade diet and evaluate tolerance of current PO recommendation  Patient Treatment Goals:    Short Term Goals:  Pt will implement identified compensatory swallowing strategies on 90% of opportunities or greater to improve airway protection and swallow function.  Pt will improve bolus prep/control and mastication with  minimal verbal prompts to advance diet grade by 1 IDDSI level .    Long Term Goals:   Pt will maintain adequate nutrition/hydration via PO intake of the least restrictive oral diet with implementation of safe swallow/ compensatory strategies and decrease signs/symptoms of aspiration to less than 1 x/day.      Patient/family Goal:    To be able to eat/drink     Plan of care discussed with Patient and Family   The Patient did not demonstrate complete understanding of the diagnosis, prognosis and plan of care and Family understand(s) the diagnosis, prognosis and plan of care     Rehabilitation Potential/Prognosis: good                    ADMITTING DIAGNOSIS: Enteritis [K52.9]  Gastrointestinal hemorrhage, unspecified gastrointestinal hemorrhage type [K92.2]  Nausea and vomiting, unspecified vomiting type [R11.2]    VISIT DIAGNOSIS:   Visit Diagnoses         Codes    Nausea and vomiting, unspecified vomiting type    -  Primary R11.2    Gastrointestinal hemorrhage, unspecified gastrointestinal hemorrhage type     K92.2    Acute anemia     D64.9             PATIENT REPORT/COMPLAINT: denies difficulty swallowing--wife   RN cleared patient for participation in 
Nurse, Patient, and Family    Time/Communication  Greater than 50% of time spent, total 25 minutes in counseling and coordination of care at the bedside regarding  CODE STATUS discussion, goals of care, and diagnosis and prognosis.    Thank you for allowing Palliative Medicine to participate in the care of Jaron Alvarado.    
long term treatment goals are located in below grid    Patient and or family understand(s) diagnosis, prognosis, and plan of care.    Frequency of treatments: Patient will be seen  3-5 times/week.         Prior Level of Function: Unknown      Past medical history:   Past Medical History:   Diagnosis Date    Parkinson's disease      Past Surgical History:   Procedure Laterality Date    ARM SURGERY Left 3/23/2023    LEFT HIP  IRRIGATION AND DEBRIDEMENT WITH POSSIBLE EXCHANGE OF FEMORAL HEAD COMPONENT performed by Semaj Dorantes DO at [facility] OR    DEEP BRAIN STIMULATOR PLACEMENT      L chest    FRACTURE SURGERY      nose    HIP SURGERY Left 2/28/2023    LEFT HIP HEMIARTHROPLASTY (DEPUY) performed by PAULETTE Sy DO at [facility] OR    JOINT REPLACEMENT Right 05/16/2017    knee    KNEE ARTHROSCOPY Right     TONSILLECTOMY      TOTAL KNEE ARTHROPLASTY Left 9/1/2020    TOTAL LEFT KNEE REPLACEMENT/ ARTHROPLASTY (JOLENE) performed by PAULETTE Sy DO at [facility] OR    UPPER GASTROINTESTINAL ENDOSCOPY N/A 2/13/2024    EGD BIOPSY performed by Hank Bernnan MD at [facility] ENDOSCOPY       SUBJECTIVE:    Precautions: Up with assistance, falls, alarm, and UTI, N and V, confusion      Social history: Unknown    Equipment owned: Unknown    AM-PAC Basic Mobility       AM-PAC Basic Mobility - Inpatient   How much help is needed turning from your back to your side while in a flat bed without using bedrails?: A Lot  How much help is needed moving from lying on your back to sitting on the side of a flat bed without using bedrails?: A Lot  How much help is needed moving to and from a bed to a chair?: Total  How much help is needed standing up from a chair using your arms?: A Lot  How much help is needed walking in hospital room?: Total  How much help is needed climbing 3-5 steps with a railing?: Total  -PAC Inpatient Mobility Raw Score : 9  AM-Located within [facility] Inpatient T-Scale Score : 30.55  Mobility Inpatient CMS 0-100% Score: 81.38  Mobility 
toileting (which includes using toilet, bedpan, or urinal)?: Total  How much help is needed for putting on and taking off regular upper body clothing?: A Lot  How much help is needed for taking care of personal grooming?: A Lot  How much help for eating meals?: Total  AM-PAC Inpatient Daily Activity Raw Score: 9  AM-PAC Inpatient ADL T-Scale Score : 25.33  ADL Inpatient CMS 0-100% Score: 79.59  ADL Inpatient CMS G-Code Modifier : CL     Functional Assessment:    Initial Eval Status  Date: 2/14/24 Treatment Status  Date: STGs = LTGs  Time frame: 10-14 days   Feeding Dependent     Poor cognition  Moderate Assist    Grooming Maximal Assist     Wash face with Max tactile and verbal cues.   Moderate Assistance   UB Dressing Maximal Assist     Gown management   Moderate Assist    LB Dressing Dependent   Moderate Assist    Bathing Maximal Assist    Sponge bath bed level; Max tactile and verbal cues to wash chest.  Moderate Assist    Toileting Dependent   Moderate Assist    Bed Mobility  Supine to sit: Maximal Assist   Sit to supine: Maximal Assist   Rolling:Maximal Assist    Supine to sit: Moderate Assist   Sit to supine: Moderate Assist   Rolling:Moderate Assist     Functional Transfers Not assessed  d/t pt overall debility, decreased activity tolerance, balance deficits, safety and fall risk.     Moderate Assistance   Functional Mobility Not assessed  d/t pt overall debility, decreased activity tolerance, balance deficits, safety and fall risk.        Balance Sitting:     Static: poor    Dynamic: poor+  Standing: Not assessed  d/t pt overall debility, decreased activity tolerance, balance deficits, safety and fall risk.    Sitting:     Static: fair     Dynamic: fair   Standing: fair  with wheeled walker   Activity Tolerance fair -  good    Visual/  Perceptual Glasses: No                Hand Dominance: Right     AROM (PROM) Strength Additional Info:  Goal:   RUE  WFL Unable to evaluate due to cognition Fair  and Poor+ 
disease, dementia and deep brain stimulator in place.  Palliative care and hospice recommendations have been noted and CODE STATUS has been adjusted.  Remains on antibiotics for potential urinary tract infection and cultures are pending.  PT, OT, speech and social work for discharge planning purposes, underlying co-morbidites will be addressed during hospitalization as well. Labs and vital signs will be monitored closely and addressed accordingly. See additional orders for details. Continue current therapy.  See orders for further plan of care.    Jaron requires this high level of physician care and nursing on the IMC/Telemetry unit due the complexity of decision management and chance of rapid decline or death.  Continued cardiac monitoring and higher level of nursing are required. I am readily available for any further decision-making and intervention.       More than 50% of my time was spent at the bedside counseling/coordinating care with the patient and/or family with face to face contact.  This time was spent reviewing notes and laboratory data as well as instructing and counseling the patient. Time I spent with the family or surrogate(s) is included only if the patient was incapable of providing the necessary information or participating in medical decisions. I also discussed the differential diagnosis and all of the proposed management plans with the patient and individuals accompanying the patient. I am readily available for any further decision-making and intervention.       YOLI Kelley - CNP  2/14/2024  10:24 AM    
placed, blood and urine cultures will be obtained and Rocephin and metronidazole have been implemented.  Anemia workup is being undertaken, blood counts are being trended and we await updated recommendations from the following consultants.  Palliative care will also follow as the patient's overall comorbid disease processes with acute problems superimposed portends a poor prognosis, goals of care and CODE STATUS will need to be determined.  PT, OT, speech and social work for discharge planning purposes, underlying co-morbidites will be addressed during hospitalization as well. Labs and vital signs will be monitored closely and addressed accordingly. See additional orders for details. Continue current therapy.  See orders for further plan of care.      More than 50% of my time was spent at the bedside counseling/coordinating care with the patient and/or family with face to face contact.  This time was spent reviewing notes and laboratory data as well as instructing and counseling the patient. Time I spent with the family or surrogate(s) is included only if the patient was incapable of providing the necessary information or participating in medical decisions. I also discussed the differential diagnosis and all of the proposed management plans with the patient and individuals accompanying the patient. I am readily available for any further decision-making and intervention.       YOLI Kelley - CNP  2/13/2024  7:45 AM    
willingness for PEG tube if unable to take by mouth. Blood counts remain stable and has been no further bowel movements or clinical bleeding.  Appears to be at or near his mental status baseline with advanced Parkinson disease, dementia and deep brain stimulator in place.  Palliative care and hospice recommendations have been noted and CODE STATUS has been adjusted.  Remains on antibiotics for E. coli urinary tract infection and final sensitivities are pending.  PT, OT, speech and social work for discharge planning purposes, underlying co-morbidites will be addressed during hospitalization as well. Labs and vital signs will be monitored closely and addressed accordingly. See additional orders for details. Continue current therapy.  See orders for further plan of care.    Jaron requires this high level of physician care and nursing on the IMC/Telemetry unit due the complexity of decision management and chance of rapid decline or death.  Continued cardiac monitoring and higher level of nursing are required. I am readily available for any further decision-making and intervention.       More than 50% of my time was spent at the bedside counseling/coordinating care with the patient and/or family with face to face contact.  This time was spent reviewing notes and laboratory data as well as instructing and counseling the patient. Time I spent with the family or surrogate(s) is included only if the patient was incapable of providing the necessary information or participating in medical decisions. I also discussed the differential diagnosis and all of the proposed management plans with the patient and individuals accompanying the patient. I am readily available for any further decision-making and intervention.       YOLI Kelley - CNP  2/15/2024  8:14 AM

## 2024-02-28 ENCOUNTER — CARE COORDINATION (OUTPATIENT)
Dept: CARE COORDINATION | Age: 74
End: 2024-02-28

## 2024-02-28 NOTE — CARE COORDINATION
future appointments.    Care Transition Nurse reviewed discharge instructions with spouse/partner and discussed any barriers to care and/or understanding of plan of care after discharge. Discussed appropriate site of care based on symptoms and resources available to patient including: PCP  Urgent care clinics  When to call 911  Condition related references. The spouse/partner agrees to contact the PCP office for questions related to their healthcare.     Advance Care Planning:   Unchanged .     Patients top risk factors for readmission:  Covid  Interventions to address risk factors: CTN review 5 day covid quarantine and an additional 5 days of masking for appt outings. Reviewed covid complication risks to call 911 including MI. Stroke, PE/clots, and respiratory difficult, trending sats less than 90% unresponsive to rest.     Offered patient enrollment in the Remote Patient Monitoring (RPM) program for in-home monitoring: Will check home sats.      Care Transitions Subsequent and Final Call    Schedule Follow Up Appointment with PCP: Declined  Subsequent and Final Calls  Do you have any ongoing symptoms?: Yes  Patient-reported symptoms: Vomiting, Nausea, Weakness  Have your medications changed?: No  Do you have any questions related to your medications?: No  Do you currently have any active services?: No  Do you have any needs or concerns that I can assist you with?: No  Identified Barriers: Lack of Education  Care Transitions Interventions   Hospice: Declined      Palliative Care: Declined     Other Interventions:             Care Transition Nurse provided contact information for future needs. Plan for follow-up call in 3-5 days based on severity of symptoms and risk factors.  Plan for next call: CT Subsequent. Enc RPM (has declined hospice/pall care) if needed. Check home sats. Home covid test positive. Symptom check.    Meg Nathan RN

## 2024-03-07 ENCOUNTER — CARE COORDINATION (OUTPATIENT)
Dept: CARE COORDINATION | Age: 74
End: 2024-03-07

## 2024-03-07 NOTE — CARE COORDINATION
Care Transitions Follow Up Call    Patient Current Location:  Home: [address] [address]    Care Transition Nurse contacted the spouse/partner by telephone to follow up after admission.  Verified name and  with spouse/partner as identifiers.    Patient: Jaron Alvarado  Patient : 1950   MRN: [id number]  Reason for Admission: 2024 - 2024 [facility] IP. Enteritis, UTI. Positive home covid test post discharge.  Discharge Date: 24 RARS: Readmission Risk Score: 17  NR  CT      [facility]   Declined hospice.    Needs to be reviewed by the provider   Additional needs identified to be addressed with provider: No  none             Method of communication with provider: none.    CTN spoke w/ spouse/Emily. Declines PCP appt, need for RPM, or ACM services. Continues to feel Pall Care/Hospice not needed at this time.    Reports no further covid or lingering respiratory symptoms. Has since tested negative on home test but unsure of date. Has generalized weakness at baseline. States he sats 92-98%. He is mostly continent during the day and wears briefs at night for nocturnal incontinence. No acute pain complaints, urinary odor or color concerns, or NV. Continues w/ [facility]. States no needs. Denies any home or med refill needs. Noting spouse disengagement.    Addressed changes since last contact:  none  Discussed follow-up appointments. If no appointment was previously scheduled, appointment scheduling offered: Yes.   Is follow up appointment scheduled within 7 days of discharge? No.    Follow Up  No future appointments.    Care Transition Nurse reviewed red flags with spouse/partner and discussed any barriers to care and/or understanding of plan of care after discharge. Discussed appropriate site of care based on symptoms and resources available to patient including: Condition related references. The spouse/partner agrees to contact the PCP office for

## 2024-03-13 ENCOUNTER — OFFICE VISIT (OUTPATIENT)
Dept: FAMILY MEDICINE CLINIC | Age: 74
End: 2024-03-13
Payer: MEDICARE

## 2024-03-13 VITALS
RESPIRATION RATE: 16 BRPM | OXYGEN SATURATION: 96 % | SYSTOLIC BLOOD PRESSURE: 98 MMHG | HEART RATE: 85 BPM | HEIGHT: 67 IN | BODY MASS INDEX: 20.12 KG/M2 | TEMPERATURE: 97.4 F | DIASTOLIC BLOOD PRESSURE: 54 MMHG

## 2024-03-13 DIAGNOSIS — R82.90 ABNORMAL URINALYSIS: Primary | ICD-10-CM

## 2024-03-13 DIAGNOSIS — Z23 IMMUNIZATION DUE: ICD-10-CM

## 2024-03-13 DIAGNOSIS — K27.9 PUD (PEPTIC ULCER DISEASE): ICD-10-CM

## 2024-03-13 DIAGNOSIS — A49.8 E COLI INFECTION: ICD-10-CM

## 2024-03-13 DIAGNOSIS — K59.04 CHRONIC IDIOPATHIC CONSTIPATION: ICD-10-CM

## 2024-03-13 LAB
BILIRUBIN, POC: ABNORMAL
BLOOD URINE, POC: NEGATIVE
CLARITY, POC: ABNORMAL
COLOR, POC: YELLOW
GLUCOSE URINE, POC: NEGATIVE
KETONES, POC: ABNORMAL
LEUKOCYTE EST, POC: ABNORMAL
NITRITE, POC: POSITIVE
PH, POC: 8
PROTEIN, POC: ABNORMAL
SPECIFIC GRAVITY, POC: 1.01
UROBILINOGEN, POC: ABNORMAL

## 2024-03-13 PROCEDURE — 1111F DSCHRG MED/CURRENT MED MERGE: CPT | Performed by: FAMILY MEDICINE

## 2024-03-13 PROCEDURE — G8420 CALC BMI NORM PARAMETERS: HCPCS | Performed by: FAMILY MEDICINE

## 2024-03-13 PROCEDURE — 90677 PCV20 VACCINE IM: CPT | Performed by: FAMILY MEDICINE

## 2024-03-13 PROCEDURE — 99214 OFFICE O/P EST MOD 30 MIN: CPT | Performed by: FAMILY MEDICINE

## 2024-03-13 PROCEDURE — G0009 ADMIN PNEUMOCOCCAL VACCINE: HCPCS | Performed by: FAMILY MEDICINE

## 2024-03-13 PROCEDURE — 1123F ACP DISCUSS/DSCN MKR DOCD: CPT | Performed by: FAMILY MEDICINE

## 2024-03-13 PROCEDURE — 3017F COLORECTAL CA SCREEN DOC REV: CPT | Performed by: FAMILY MEDICINE

## 2024-03-13 PROCEDURE — 1036F TOBACCO NON-USER: CPT | Performed by: FAMILY MEDICINE

## 2024-03-13 PROCEDURE — G8427 DOCREV CUR MEDS BY ELIG CLIN: HCPCS | Performed by: FAMILY MEDICINE

## 2024-03-13 PROCEDURE — 81002 URINALYSIS NONAUTO W/O SCOPE: CPT | Performed by: FAMILY MEDICINE

## 2024-03-13 PROCEDURE — G8484 FLU IMMUNIZE NO ADMIN: HCPCS | Performed by: FAMILY MEDICINE

## 2024-03-13 RX ORDER — SUCRALFATE 1 G/1
1 TABLET ORAL EVERY 8 HOURS SCHEDULED
Qty: 120 TABLET | Refills: 0 | Status: SHIPPED | OUTPATIENT
Start: 2024-03-13

## 2024-03-13 RX ORDER — PANTOPRAZOLE SODIUM 40 MG/1
40 TABLET, DELAYED RELEASE ORAL
Qty: 30 TABLET | Refills: 0 | Status: SHIPPED | OUTPATIENT
Start: 2024-03-13

## 2024-03-13 RX ORDER — SULFAMETHOXAZOLE AND TRIMETHOPRIM 800; 160 MG/1; MG/1
1 TABLET ORAL DAILY
Qty: 30 TABLET | Refills: 0 | Status: SHIPPED | OUTPATIENT
Start: 2024-03-13

## 2024-03-13 RX ORDER — PSEUDOEPHEDRINE HCL 30 MG
100 TABLET ORAL DAILY
Qty: 30 CAPSULE | Refills: 0 | Status: SHIPPED | OUTPATIENT
Start: 2024-03-13

## 2024-03-13 ASSESSMENT — PATIENT HEALTH QUESTIONNAIRE - PHQ9: DEPRESSION UNABLE TO ASSESS: FUNCTIONAL CAPACITY MOTIVATION LIMITS ACCURACY

## 2024-03-13 NOTE — PROGRESS NOTES
Jaron Alvarado (:  1950) is a 73 y.o. male,Established patient, here for evaluation of the following chief complaint(s):  Follow-Up from Hospital (24 [facility])         ASSESSMENT/PLAN:  1. E coli infection  -     POCT Urinalysis no Micro  2. PUD (peptic ulcer disease)  -     pantoprazole (PROTONIX) 40 MG tablet; Take 1 tablet by mouth every morning (before breakfast), Disp-30 tablet, R-0Normal  -     sucralfate (CARAFATE) 1 GM tablet; Take 1 tablet by mouth every 8 hours, Disp-120 tablet, R-0Normal  3. Chronic idiopathic constipation  -     docusate (COLACE, DULCOLAX) 100 MG CAPS; Take 100 mg by mouth daily, Disp-30 capsule, R-0Normal      No follow-ups on file.         Subjective   SUBJECTIVE/OBJECTIVE:  HPI    Review of Systems   Constitutional:  Negative for chills and diaphoresis.   HENT:  Negative for ear discharge, ear pain, hearing loss, nosebleeds and tinnitus.    Respiratory:  Negative for wheezing.    Cardiovascular:  Negative for chest pain.   Gastrointestinal:  Negative for blood in stool, constipation and diarrhea.   Genitourinary:  Negative for dysuria, flank pain and hematuria.   Skin:  Negative for rash.   Neurological:  Negative for headaches.   Hematological:  Does not bruise/bleed easily.          Objective   BP (!) 98/54   Pulse 85   Temp 97.4 °F (36.3 °C)   Resp 16   Ht 1.702 m (5' 7.01\")   SpO2 96%   BMI 20.12 kg/m²   No results found for: \"LABA1C\"  Physical Exam  Eyes:      General:         Right eye: No discharge.         Left eye: No discharge.   Cardiovascular:      Rate and Rhythm: Normal rate and regular rhythm.      Heart sounds: No murmur heard.  Pulmonary:      Effort: No respiratory distress.      Breath sounds: No rhonchi or rales.   Abdominal:      Tenderness: There is no abdominal tenderness.   Musculoskeletal:      Cervical back: No rigidity.   Skin:     General: Skin is warm.      Capillary Refill: Capillary refill takes less than 2 seconds.

## 2024-03-17 LAB
CULTURE: ABNORMAL
SPECIMEN DESCRIPTION: ABNORMAL

## 2024-03-18 ASSESSMENT — ENCOUNTER SYMPTOMS
CONSTIPATION: 0
WHEEZING: 0
DIARRHEA: 0
BLOOD IN STOOL: 0

## 2024-04-24 NOTE — ANESTHESIA POSTPROCEDURE EVALUATION
Department of Anesthesiology  Postprocedure Note    Patient: Margy Astudillo  MRN: [id number]  YOB: 1950  Date of evaluation: 9/1/2020  Time:  4:58 PM     Procedure Summary     Date:  09/01/20 Room / Location:  [address] [place] [address] / [address] [place] [address]    Anesthesia Start:  0168 Anesthesia Stop:  9826    Procedure:  TOTAL LEFT KNEE REPLACEMENT/ ARTHROPLASTY (JOLENE) (Left ) Diagnosis:  (DEGENERATION LEFT KNEE)    Surgeon:  Kae Fuentes DO Responsible Provider:  Melani Morton MD    Anesthesia Type:  spinal ASA Status:  3          Anesthesia Type: spinal    Malcolm Phase I: Malcolm Score: 9    Malcolm Phase II:      Last vitals: Reviewed and per EMR flowsheets.        Anesthesia Post Evaluation    Patient location during evaluation: PACU  Patient participation: complete - patient participated  Level of consciousness: awake  Airway patency: patent  Nausea & Vomiting: no nausea and no vomiting  Complications: no  Cardiovascular status: hemodynamically stable  Respiratory status: acceptable  Hydration status: stable Quality 130: Documentation Of Current Medications In The Medical Record: Current Medications Documented Detail Level: Detailed Quality 226: Preventive Care And Screening: Tobacco Use: Screening And Cessation Intervention: Patient screened for tobacco use and is an ex/non-smoker

## 2024-06-17 DIAGNOSIS — G20.A1 PARKINSON DISEASE (HCC): ICD-10-CM

## 2024-09-10 DIAGNOSIS — G20.A1 PARKINSON DISEASE (HCC): ICD-10-CM

## 2024-09-10 RX ORDER — CARBIDOPA AND LEVODOPA 25; 100 MG/1; MG/1
2 TABLET ORAL 4 TIMES DAILY
Qty: 240 TABLET | Refills: 2 | Status: SHIPPED | OUTPATIENT
Start: 2024-09-10

## 2024-11-26 DIAGNOSIS — G20.A1 PARKINSON DISEASE (HCC): ICD-10-CM

## 2024-11-26 RX ORDER — CARBIDOPA AND LEVODOPA 25; 100 MG/1; MG/1
2 TABLET ORAL 4 TIMES DAILY
Qty: 240 TABLET | Refills: 0 | Status: SHIPPED | OUTPATIENT
Start: 2024-11-26

## 2024-11-26 NOTE — TELEPHONE ENCOUNTER
Name of Medication(s) Requested:  Requested Prescriptions     Pending Prescriptions Disp Refills    carbidopa-levodopa (SINEMET)  MG per tablet [Pharmacy Med Name: CARB/LEVO 25-100MG  TAB] 240 tablet 0     Sig: TAKE 2 TABLETS BY MOUTH 4 TIMES DAILY       Medication is on current medication list Yes    Dosage and directions were verified? Yes    Quantity verified: 90 day supply     Pharmacy Verified?  Yes    Last Appointment:  Visit date not found    Future appts:  No future appointments.     (If no appt send self scheduling link. .REFILLAPPT)  Scheduling request sent?     [] Yes  [x] No    Does patient need updated?  [] Yes  [x] No

## 2024-12-21 DIAGNOSIS — G20.A1 PARKINSON DISEASE (HCC): ICD-10-CM

## 2024-12-23 RX ORDER — CARBIDOPA AND LEVODOPA 25; 100 MG/1; MG/1
2 TABLET ORAL 4 TIMES DAILY
Qty: 240 TABLET | Refills: 0 | Status: SHIPPED | OUTPATIENT
Start: 2024-12-23

## 2024-12-23 NOTE — TELEPHONE ENCOUNTER
Name of Medication(s) Requested:  Requested Prescriptions     Pending Prescriptions Disp Refills    carbidopa-levodopa (SINEMET)  MG per tablet [Pharmacy Med Name: Carbidopa-Levodopa  MG Oral Tablet] 240 tablet 0     Sig: TAKE 2 TABLETS BY MOUTH 4 TIMES DAILY       Medication is on current medication list Yes    Dosage and directions were verified? Yes    Quantity verified: 90 day supply     Pharmacy Verified?  Yes    Last Appointment:  3/13/2024    Future appts:  No future appointments.     (If no appt send self scheduling link. .REFILLAPPT)  Scheduling request sent?     [] Yes  [x] No    Does patient need updated?  [] Yes  [x] No

## 2025-01-20 DIAGNOSIS — G20.A1 PARKINSON DISEASE (HCC): ICD-10-CM

## 2025-01-20 RX ORDER — CARBIDOPA AND LEVODOPA 25; 100 MG/1; MG/1
2 TABLET ORAL 4 TIMES DAILY
Qty: 240 TABLET | Refills: 0 | Status: SHIPPED | OUTPATIENT
Start: 2025-01-20

## 2025-01-20 NOTE — TELEPHONE ENCOUNTER
Name of Medication(s) Requested:  Requested Prescriptions     Pending Prescriptions Disp Refills    carbidopa-levodopa (SINEMET)  MG per tablet [Pharmacy Med Name: Carbidopa-Levodopa  MG Oral Tablet] 240 tablet 0     Sig: TAKE 2 TABLETS BY MOUTH 4 TIMES DAILY       Medication is on current medication list Yes    Dosage and directions were verified? Yes    Quantity verified: 30 day supply     Pharmacy Verified?  Yes    Last Appointment:  3/13/2024    Future appts:  No future appointments.     (If no appt send self scheduling link. .REFILLAPPT)  Scheduling request sent?     [x] Yes  [] No    Does patient need updated?  [] Yes  [x] No

## 2025-02-14 DIAGNOSIS — G20.A1 PARKINSON DISEASE (HCC): ICD-10-CM

## 2025-02-14 RX ORDER — CARBIDOPA AND LEVODOPA 25; 100 MG/1; MG/1
2 TABLET ORAL 4 TIMES DAILY
Qty: 240 TABLET | Refills: 0 | Status: SHIPPED | OUTPATIENT
Start: 2025-02-14

## 2025-03-07 ENCOUNTER — TELEPHONE (OUTPATIENT)
Dept: FAMILY MEDICINE CLINIC | Age: 75
End: 2025-03-07

## 2025-03-07 NOTE — TELEPHONE ENCOUNTER
Spoke with spouse to schedule patients AWV.    Spouse would like to call back and schedule after speaking with Daughter who usually helps get him to appointments.

## 2025-03-10 DIAGNOSIS — G20.A1 PARKINSON DISEASE (HCC): ICD-10-CM

## 2025-03-10 RX ORDER — CARBIDOPA AND LEVODOPA 25; 100 MG/1; MG/1
TABLET ORAL
Qty: 240 TABLET | Refills: 0 | Status: SHIPPED | OUTPATIENT
Start: 2025-03-10

## 2025-03-10 NOTE — TELEPHONE ENCOUNTER
Name of Medication(s) Requested:  Requested Prescriptions     Pending Prescriptions Disp Refills    carbidopa-levodopa (SINEMET)  MG per tablet [Pharmacy Med Name: Carbidopa-Levodopa  MG Oral Tablet] 240 tablet 0     Sig: TAKE 2 TABLETS BY MOUTH 4 TIMES DAILY . APPOINTMENT REQUIRED FOR FUTURE REFILLS       Medication is on current medication list Yes    Dosage and directions were verified? Yes    Quantity verified: 30 day supply     Pharmacy Verified?  Yes    Last Appointment:  Visit date not found    Future appts:  No future appointments.     (If no appt send self scheduling link. .REFILLAPPT)  Scheduling request sent?     [] Yes  [x] No    Does patient need updated?  [] Yes  [x] No

## 2025-04-07 DIAGNOSIS — G20.A1 PARKINSON DISEASE (HCC): ICD-10-CM

## 2025-04-07 RX ORDER — CARBIDOPA AND LEVODOPA 25; 100 MG/1; MG/1
TABLET ORAL
Qty: 120 TABLET | Refills: 0 | Status: SHIPPED | OUTPATIENT
Start: 2025-04-07

## 2025-04-07 NOTE — TELEPHONE ENCOUNTER
Name of Medication(s) Requested:  Requested Prescriptions     Pending Prescriptions Disp Refills    carbidopa-levodopa (SINEMET)  MG per tablet [Pharmacy Med Name: CARB/LEVO 25-100MG  TAB] 120 tablet 0     Sig: TAKE 2 TABLETS BY MOUTH 4 TIMES DAILY.       Medication is on current medication list Yes    Dosage and directions were verified? Yes    Quantity verified: 15 day supply     Pharmacy Verified?  Yes    Last Appointment:  3/13/2024    Future appts:  No future appointments.     (If no appt send self scheduling link. .REFILLAPPT)  Scheduling request sent?     [x] Yes  [] No    Does patient need updated?  [] Yes  [x] No

## 2025-04-22 DIAGNOSIS — G20.A1 PARKINSON DISEASE (HCC): ICD-10-CM

## 2025-04-22 RX ORDER — CARBIDOPA AND LEVODOPA 25; 100 MG/1; MG/1
TABLET ORAL
Qty: 56 TABLET | Refills: 0 | Status: SHIPPED | OUTPATIENT
Start: 2025-04-22

## 2025-04-22 NOTE — TELEPHONE ENCOUNTER
Name of Medication(s) Requested:  Requested Prescriptions     Pending Prescriptions Disp Refills    carbidopa-levodopa (SINEMET)  MG per tablet [Pharmacy Med Name: Carbidopa-Levodopa  MG Oral Tablet] 56 tablet 0     Sig: TAKE 2 TABLETS BY MOUTH 4 TIMES DAILY       Medication is on current medication list Yes    Dosage and directions were verified? Yes    Quantity verified: 7 day supply     Pharmacy Verified?  Yes    Last Appointment:  3/13/2024    Future appts:  No future appointments.     (If no appt send self scheduling link. .REFILLAPPT)  Scheduling request sent?     [x] Yes  [] No    Does patient need updated?  [] Yes  [x] No

## (undated) DEVICE — INTENDED FOR TISSUE SEPARATION, AND OTHER PROCEDURES THAT REQUIRE A SHARP SURGICAL BLADE TO PUNCTURE OR CUT.: Brand: BARD-PARKER ® STAINLESS STEEL BLADES

## (undated) DEVICE — BANDAGE COMPR W6INXL12FT SMOOTH FOR LIMB EXSANG ESMARCH

## (undated) DEVICE — TUBING, SUCTION, 1/4" X 10', STRAIGHT: Brand: MEDLINE

## (undated) DEVICE — CANNULA IV 18GA L15IN BLNT FILL LUERLOCK HUB MJCT

## (undated) DEVICE — BANDAGE,GAUZE,CONFORMING,3"X75",STRL,LF: Brand: MEDLINE

## (undated) DEVICE — MARKER,SKIN,WI/RULER AND LABELS: Brand: MEDLINE

## (undated) DEVICE — MEDI-VAC YANKAUER SUCTION HANDLE: Brand: CARDINAL HEALTH

## (undated) DEVICE — STRIP,CLOSURE,WOUND,MEDI-STRIP,1/2X4: Brand: MEDLINE

## (undated) DEVICE — SPONGE LAP W18XL18IN WHT COT 4 PLY FLD STRUNG RADPQ DISP ST

## (undated) DEVICE — ELECTRODE PT RET AD L9FT HI MOIST COND ADH HYDRGEL CORDED

## (undated) DEVICE — NDL CNTR 40CT FM MAG: Brand: MEDLINE INDUSTRIES, INC.

## (undated) DEVICE — BANDAGE COMPR W6INXL5YD SELF ADH COHESIVE CO FLX

## (undated) DEVICE — TOWEL,OR,DSP,ST,BLUE,STD,6/PK,12PK/CS: Brand: MEDLINE

## (undated) DEVICE — GOWN,SIRUS,POLYRNF,RAGLAN,XL,ST,30/CS: Brand: MEDLINE

## (undated) DEVICE — DRESSING PETRO W3XL3IN OIL EMUL N ADH GZ KNIT IMPREG CELOS

## (undated) DEVICE — COVER,LIGHT HANDLE,FLX,1/PK: Brand: MEDLINE INDUSTRIES, INC.

## (undated) DEVICE — 3M™ STERI-DRAPE™ U-DRAPE 1015: Brand: STERI-DRAPE™

## (undated) DEVICE — BLOCK BITE 60FR CAREGUARD

## (undated) DEVICE — Z DISCONTINUED NO SUB IDED GLOVE SURG BEAD CUF 8 STD PF WHT STRL TRIUMPH LT LTX

## (undated) DEVICE — PADDING,UNDERCAST,COTTON, 4"X4YD STERILE: Brand: MEDLINE

## (undated) DEVICE — READY WET SKIN SCRUB TRAY-LF: Brand: MEDLINE INDUSTRIES, INC.

## (undated) DEVICE — AIR/WATER CLEANING ADAPTER FOR OLYMPUS® GI ENDOSCOPE: Brand: BULLDOG®

## (undated) DEVICE — GAUZE,SPONGE,4"X4",16PLY,XRAY,STRL,LF: Brand: MEDLINE

## (undated) DEVICE — Z DISCONTINUED PER MEDLINE USE 2741944 DRESSING AQUACEL 12 IN SURG W9XL30CM SIL CVR WTRPRF VIR BACT BARR ANTIMIC

## (undated) DEVICE — STRYKER PERFORMANCE SERIES SAGITTAL BLADE: Brand: STRYKER PERFORMANCE SERIES

## (undated) DEVICE — GAUZE,SPONGE,4"X4",16PLY,STRL,LF,10/TRAY: Brand: MEDLINE

## (undated) DEVICE — KIT BEDSIDE REVITAL OX 500ML

## (undated) DEVICE — BANDAGE COMPR W3INXL5YD WHT BGE POLY COT M E WRP WV HK AND

## (undated) DEVICE — GLOVE ORTHO 8   MSG9480

## (undated) DEVICE — MASTISOL ADHESIVE LIQ 2/3ML

## (undated) DEVICE — DOUBLE BASIN SET: Brand: MEDLINE INDUSTRIES, INC.

## (undated) DEVICE — SYRINGE IRRIG 60ML SFT PLIABLE BLB EZ TO GRP 1 HND USE W/

## (undated) DEVICE — SOLUTION IRRIG 1000ML 0.9% SOD CHL USP POUR PLAS BTL

## (undated) DEVICE — GOWN ISOLATN XL BLU POLYPR OVR HD OPN BK KNIT CUF PROTCT

## (undated) DEVICE — SPONGE GZ 4IN 4IN 4 PLY N WVN AVANT

## (undated) DEVICE — COVER,TABLE,60X90,STERILE: Brand: MEDLINE

## (undated) DEVICE — PITCHER PT 1200ML W HNDL CSR WRP

## (undated) DEVICE — JELLY,LUBE,STERILE,FLIP TOP,TUBE,4-OZ: Brand: MEDLINE

## (undated) DEVICE — DRESSING GZ W1XL8IN COT XRFRM N ADH OVERWRAP CURAD

## (undated) DEVICE — BLADE SAW SAG NAR THCK LNG 12.5MM

## (undated) DEVICE — SYRINGE MED 50ML LUERLOCK TIP

## (undated) DEVICE — APPLICATOR MEDICATED 26 CC SOLUTION HI LT ORNG CHLORAPREP

## (undated) DEVICE — SPONGE,LAP,12"X12",XR,ST,5/PK,40PK/CS: Brand: MEDLINE

## (undated) DEVICE — 4-PORT MANIFOLD: Brand: NEPTUNE 2

## (undated) DEVICE — BAG SPECIMEN BIOHAZARD 6IN X 9IN

## (undated) DEVICE — CLOTH SURG PREP PREOPERATIVE CHLORHEXIDINE GLUC 2% READYPREP

## (undated) DEVICE — 3M™ IOBAN™ 2 ANTIMICROBIAL INCISE DRAPE 6651EZ: Brand: IOBAN™ 2

## (undated) DEVICE — 2108 SERIES SAGITTAL BLADE (24.8 X 0.88 X 90.5MM)

## (undated) DEVICE — SET MAJOR INSTR ORTHO

## (undated) DEVICE — WIPES SKIN CLOTH READYPREP 9 X 10.5 IN 2% CHLORHEX GLUCONATE CHG PREOP

## (undated) DEVICE — PACK,EXTREMITY I: Brand: MEDLINE

## (undated) DEVICE — Device

## (undated) DEVICE — Z DISCONTINUED USE 2275686 GLOVE SURG SZ 8 L12IN FNGR THK13MIL WHT ISOLEX POLYISOPRENE

## (undated) DEVICE — NEEDLE FLTR 18GA L1.5IN MEM THK5UM BLNT DISP

## (undated) DEVICE — GOWN,SIRUS,FABRNF,XL,20/CS: Brand: MEDLINE

## (undated) DEVICE — SYRINGE MED 30ML STD CLR PLAS LUERLOCK TIP N CTRL DISP

## (undated) DEVICE — FORCEPS BX L240CM JAW DIA2.8MM L CAP W/ NDL MIC MESH TOOTH

## (undated) DEVICE — DRAPE 54X4IN 2 PLY PREROLL WATER RESIST

## (undated) DEVICE — SHEET,DRAPE,40X58,STERILE: Brand: MEDLINE

## (undated) DEVICE — KENDALL 450 SERIES MONITORING FOAM ELECTRODE - RECTANGULAR SHAPE ( 3/PK): Brand: KENDALL

## (undated) DEVICE — MASK,FACE,MAXFLUIDPROTECT,SHIELD/ERLPS: Brand: MEDLINE

## (undated) DEVICE — SHEET SUPPORT

## (undated) DEVICE — BANDAGE,SELF ADHRNT,COFLEX,4"X5YD,STRL: Brand: COLABEL

## (undated) DEVICE — SOLUTION IV IRRIG POUR BRL 0.9% SODIUM CHL 2F7124

## (undated) DEVICE — CONTAINER SPEC COLL 960ML POLYPR TRIANG GRAD INTAKE/OUTPUT

## (undated) DEVICE — BANDAGE COMPR L W4INXL11YD 100% COT WVN E DBL LEN CLP CLSR

## (undated) DEVICE — DRESSING HYDROFIBER AQUACEL AG ADVANTAGE 3.5X10 IN

## (undated) DEVICE — PENCIL ES L3M BTTN SWCH HOLSTER W/ BLDE ELECTRD EDGE

## (undated) DEVICE — Device: Brand: DEFENDO VALVE AND CONNECTOR KIT

## (undated) DEVICE — GLOVE ORANGE PI 8   MSG9080

## (undated) DEVICE — PAD,ABDOMINAL,8"X10",ST,LF: Brand: MEDLINE

## (undated) DEVICE — YANKAUER,BULB TIP,W/O VENT,RIGID,STERILE: Brand: MEDLINE

## (undated) DEVICE — 3M™ IOBAN™ 2 ANTIMICROBIAL INCISE DRAPE 6640EZ: Brand: IOBAN™ 2

## (undated) DEVICE — SURGICAL PROCEDURE PACK ORTH IV ECLIPSE STRL

## (undated) DEVICE — PACK,EXTREMITY,ORTHOMAX,5/CS: Brand: MEDLINE

## (undated) DEVICE — SHEET DRAPE FULL 70X100

## (undated) DEVICE — CELLERATE RX 5 GM SURG PWD HYDROLYZED CLLGN

## (undated) DEVICE — GOWN,SIRUS,POLYRNF,BRTHSLV,XLN/XL,20/CS: Brand: MEDLINE

## (undated) DEVICE — STANDARD HYPODERMIC NEEDLE,POLYPROPYLENE HUB: Brand: MONOJECT